# Patient Record
Sex: FEMALE | URBAN - NONMETROPOLITAN AREA
[De-identification: names, ages, dates, MRNs, and addresses within clinical notes are randomized per-mention and may not be internally consistent; named-entity substitution may affect disease eponyms.]

---

## 2019-07-19 ENCOUNTER — DOCTOR'S OFFICE (OUTPATIENT)
Dept: URBAN - NONMETROPOLITAN AREA CLINIC 1 | Facility: CLINIC | Age: 79
Setting detail: OPHTHALMOLOGY
End: 2019-07-19
Payer: COMMERCIAL

## 2019-07-19 DIAGNOSIS — L25.5: ICD-10-CM

## 2019-07-19 PROCEDURE — 99201 NP BRIEF: CPT | Performed by: OPHTHALMOLOGY

## 2019-07-19 ASSESSMENT — REFRACTION_MANIFEST
OD_VA3: 20/
OS_VA3: 20/
OS_VA2: 20/
OD_VA2: 20/
OS_VA1: 20/
OS_VA3: 20/
OU_VA: 20/
OD_VA1: 20/
OD_VA3: 20/
OD_VA2: 20/
OS_VA1: 20/
OD_VA1: 20/
OS_VA2: 20/
OU_VA: 20/

## 2019-07-19 ASSESSMENT — REFRACTION_CURRENTRX
OD_OVR_VA: 20/
OS_OVR_VA: 20/
OD_OVR_VA: 20/
OD_OVR_VA: 20/

## 2019-07-19 ASSESSMENT — VISUAL ACUITY
OD_BCVA: 20/30-1
OS_BCVA: 20/30-1

## 2019-07-19 ASSESSMENT — LID EXAM ASSESSMENTS: OS_EDEMA: LLL LUL 1+

## 2021-02-14 ENCOUNTER — APPOINTMENT (EMERGENCY)
Dept: CT IMAGING | Facility: HOSPITAL | Age: 81
End: 2021-02-14
Payer: MEDICARE

## 2021-02-14 ENCOUNTER — HOSPITAL ENCOUNTER (EMERGENCY)
Facility: HOSPITAL | Age: 81
Discharge: HOME | End: 2021-02-15
Attending: EMERGENCY MEDICINE
Payer: MEDICARE

## 2021-02-14 DIAGNOSIS — K44.9 HIATAL HERNIA: ICD-10-CM
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
## 2021-02-14 DIAGNOSIS — R10.13 EPIGASTRIC PAIN: Primary | ICD-10-CM

## 2021-02-14 LAB
ALBUMIN SERPL BCP-MCNC: 4.1 G/DL (ref 3.5–5)
ALP SERPL-CCNC: 63 U/L (ref 46–116)
ALT SERPL W P-5'-P-CCNC: 22 U/L (ref 12–78)
ANION GAP SERPL CALCULATED.3IONS-SCNC: 9 MMOL/L (ref 4–13)
AST SERPL W P-5'-P-CCNC: 16 U/L (ref 5–45)
BASOPHILS # BLD AUTO: 0.06 THOUSANDS/ΜL (ref 0–0.1)
BASOPHILS NFR BLD AUTO: 1 % (ref 0–1)
BILIRUB SERPL-MCNC: 0.2 MG/DL (ref 0.2–1)
BUN SERPL-MCNC: 13 MG/DL (ref 5–25)
CALCIUM SERPL-MCNC: 9.2 MG/DL (ref 8.3–10.1)
CHLORIDE SERPL-SCNC: 101 MMOL/L (ref 100–108)
CO2 SERPL-SCNC: 27 MMOL/L (ref 21–32)
CREAT SERPL-MCNC: 1.32 MG/DL (ref 0.6–1.3)
EOSINOPHIL # BLD AUTO: 0.13 THOUSAND/ΜL (ref 0–0.61)
EOSINOPHIL NFR BLD AUTO: 2 % (ref 0–6)
ERYTHROCYTE [DISTWIDTH] IN BLOOD BY AUTOMATED COUNT: 14 % (ref 11.6–15.1)
GFR SERPL CREATININE-BSD FRML MDRD: 38 ML/MIN/1.73SQ M
GLUCOSE SERPL-MCNC: 101 MG/DL (ref 65–140)
HCT VFR BLD AUTO: 30.8 % (ref 34.8–46.1)
HGB BLD-MCNC: 8.9 G/DL (ref 11.5–15.4)
IMM GRANULOCYTES # BLD AUTO: 0.02 THOUSAND/UL (ref 0–0.2)
IMM GRANULOCYTES NFR BLD AUTO: 0 % (ref 0–2)
LACTATE SERPL-SCNC: 0.8 MMOL/L (ref 0.5–2)
LIPASE SERPL-CCNC: 231 U/L (ref 73–393)
LYMPHOCYTES # BLD AUTO: 1.84 THOUSANDS/ΜL (ref 0.6–4.47)
LYMPHOCYTES NFR BLD AUTO: 32 % (ref 14–44)
MAGNESIUM SERPL-MCNC: 1.9 MG/DL (ref 1.6–2.6)
MCH RBC QN AUTO: 27.6 PG (ref 26.8–34.3)
MCHC RBC AUTO-ENTMCNC: 28.9 G/DL (ref 31.4–37.4)
MCV RBC AUTO: 95 FL (ref 82–98)
MONOCYTES # BLD AUTO: 0.51 THOUSAND/ΜL (ref 0.17–1.22)
MONOCYTES NFR BLD AUTO: 9 % (ref 4–12)
NEUTROPHILS # BLD AUTO: 3.18 THOUSANDS/ΜL (ref 1.85–7.62)
NEUTS SEG NFR BLD AUTO: 56 % (ref 43–75)
NRBC BLD AUTO-RTO: 0 /100 WBCS
PLATELET # BLD AUTO: 402 THOUSANDS/UL (ref 149–390)
PMV BLD AUTO: 8.6 FL (ref 8.9–12.7)
POTASSIUM SERPL-SCNC: 4.1 MMOL/L (ref 3.5–5.3)
PROT SERPL-MCNC: 7.4 G/DL (ref 6.4–8.2)
RBC # BLD AUTO: 3.23 MILLION/UL (ref 3.81–5.12)
SODIUM SERPL-SCNC: 137 MMOL/L (ref 136–145)
TROPONIN I SERPL-MCNC: <0.02 NG/ML
WBC # BLD AUTO: 5.74 THOUSAND/UL (ref 4.31–10.16)

## 2021-02-14 PROCEDURE — 85025 COMPLETE CBC W/AUTO DIFF WBC: CPT | Performed by: EMERGENCY MEDICINE

## 2021-02-14 PROCEDURE — 90471 IMMUNIZATION ADMIN: CPT

## 2021-02-14 PROCEDURE — G1004 CDSM NDSC: HCPCS

## 2021-02-14 PROCEDURE — 71250 CT THORAX DX C-: CPT

## 2021-02-14 PROCEDURE — 99284 EMERGENCY DEPT VISIT MOD MDM: CPT | Performed by: EMERGENCY MEDICINE

## 2021-02-14 PROCEDURE — 90715 TDAP VACCINE 7 YRS/> IM: CPT | Performed by: EMERGENCY MEDICINE

## 2021-02-14 PROCEDURE — 70450 CT HEAD/BRAIN W/O DYE: CPT

## 2021-02-14 PROCEDURE — 96375 TX/PRO/DX INJ NEW DRUG ADDON: CPT

## 2021-02-14 PROCEDURE — 99284 EMERGENCY DEPT VISIT MOD MDM: CPT

## 2021-02-14 PROCEDURE — 36415 COLL VENOUS BLD VENIPUNCTURE: CPT | Performed by: EMERGENCY MEDICINE

## 2021-02-14 PROCEDURE — 84484 ASSAY OF TROPONIN QUANT: CPT | Performed by: EMERGENCY MEDICINE

## 2021-02-14 PROCEDURE — 83690 ASSAY OF LIPASE: CPT | Performed by: EMERGENCY MEDICINE

## 2021-02-14 PROCEDURE — 80053 COMPREHEN METABOLIC PANEL: CPT | Performed by: EMERGENCY MEDICINE

## 2021-02-14 PROCEDURE — 83605 ASSAY OF LACTIC ACID: CPT | Performed by: EMERGENCY MEDICINE

## 2021-02-14 PROCEDURE — 93005 ELECTROCARDIOGRAM TRACING: CPT

## 2021-02-14 PROCEDURE — 72125 CT NECK SPINE W/O DYE: CPT

## 2021-02-14 PROCEDURE — 83735 ASSAY OF MAGNESIUM: CPT | Performed by: EMERGENCY MEDICINE

## 2021-02-14 PROCEDURE — 74176 CT ABD & PELVIS W/O CONTRAST: CPT

## 2021-02-14 PROCEDURE — 96374 THER/PROPH/DIAG INJ IV PUSH: CPT

## 2021-02-14 RX ORDER — FENTANYL CITRATE 50 UG/ML
25 INJECTION, SOLUTION INTRAMUSCULAR; INTRAVENOUS ONCE
Status: COMPLETED | OUTPATIENT
Start: 2021-02-14 | End: 2021-02-14

## 2021-02-14 RX ORDER — ONDANSETRON 2 MG/ML
4 INJECTION INTRAMUSCULAR; INTRAVENOUS ONCE
Status: COMPLETED | OUTPATIENT
Start: 2021-02-14 | End: 2021-02-14

## 2021-02-14 RX ADMIN — TETANUS TOXOID, REDUCED DIPHTHERIA TOXOID AND ACELLULAR PERTUSSIS VACCINE, ADSORBED 0.5 ML: 5; 2.5; 8; 8; 2.5 SUSPENSION INTRAMUSCULAR at 23:41

## 2021-02-14 RX ADMIN — ONDANSETRON 4 MG: 2 INJECTION INTRAMUSCULAR; INTRAVENOUS at 23:40

## 2021-02-14 RX ADMIN — FENTANYL CITRATE 25 MCG: 50 INJECTION, SOLUTION INTRAMUSCULAR; INTRAVENOUS at 23:39

## 2021-02-15 VITALS
DIASTOLIC BLOOD PRESSURE: 60 MMHG | HEART RATE: 70 BPM | OXYGEN SATURATION: 100 % | RESPIRATION RATE: 18 BRPM | WEIGHT: 149.03 LBS | TEMPERATURE: 98 F | SYSTOLIC BLOOD PRESSURE: 139 MMHG

## 2021-02-15 LAB
ATRIAL RATE: 72 BPM
ATRIAL RATE: 77 BPM
BACTERIA UR QL AUTO: ABNORMAL /HPF
BILIRUB UR QL STRIP: NEGATIVE
CLARITY UR: CLEAR
COLOR UR: YELLOW
GLUCOSE UR STRIP-MCNC: NEGATIVE MG/DL
HGB UR QL STRIP.AUTO: NEGATIVE
KETONES UR STRIP-MCNC: NEGATIVE MG/DL
LEUKOCYTE ESTERASE UR QL STRIP: ABNORMAL
NITRITE UR QL STRIP: NEGATIVE
NON-SQ EPI CELLS URNS QL MICRO: ABNORMAL /HPF
P AXIS: 40 DEGREES
P AXIS: 46 DEGREES
PH UR STRIP.AUTO: 5.5 [PH]
PR INTERVAL: 154 MS
PR INTERVAL: 168 MS
PROT UR STRIP-MCNC: NEGATIVE MG/DL
QRS AXIS: 49 DEGREES
QRS AXIS: 53 DEGREES
QRSD INTERVAL: 78 MS
QRSD INTERVAL: 80 MS
QT INTERVAL: 386 MS
QT INTERVAL: 394 MS
QTC INTERVAL: 431 MS
QTC INTERVAL: 436 MS
RBC #/AREA URNS AUTO: ABNORMAL /HPF
SP GR UR STRIP.AUTO: <=1.005 (ref 1–1.03)
T WAVE AXIS: 44 DEGREES
T WAVE AXIS: 51 DEGREES
TROPONIN I SERPL-MCNC: <0.02 NG/ML
UROBILINOGEN UR QL STRIP.AUTO: 0.2 E.U./DL
VENTRICULAR RATE: 72 BPM
VENTRICULAR RATE: 77 BPM
WBC #/AREA URNS AUTO: ABNORMAL /HPF

## 2021-02-15 PROCEDURE — 93010 ELECTROCARDIOGRAM REPORT: CPT | Performed by: INTERNAL MEDICINE

## 2021-02-15 PROCEDURE — 93005 ELECTROCARDIOGRAM TRACING: CPT

## 2021-02-15 PROCEDURE — 96375 TX/PRO/DX INJ NEW DRUG ADDON: CPT

## 2021-02-15 PROCEDURE — 84484 ASSAY OF TROPONIN QUANT: CPT | Performed by: EMERGENCY MEDICINE

## 2021-02-15 PROCEDURE — 81001 URINALYSIS AUTO W/SCOPE: CPT | Performed by: EMERGENCY MEDICINE

## 2021-02-15 PROCEDURE — 36415 COLL VENOUS BLD VENIPUNCTURE: CPT | Performed by: EMERGENCY MEDICINE

## 2021-02-15 RX ORDER — LIDOCAINE HYDROCHLORIDE 20 MG/ML
15 SOLUTION OROPHARYNGEAL ONCE
Status: COMPLETED | OUTPATIENT
Start: 2021-02-15 | End: 2021-02-15

## 2021-02-15 RX ORDER — MAGNESIUM HYDROXIDE/ALUMINUM HYDROXICE/SIMETHICONE 120; 1200; 1200 MG/30ML; MG/30ML; MG/30ML
15 SUSPENSION ORAL ONCE
Status: COMPLETED | OUTPATIENT
Start: 2021-02-15 | End: 2021-02-15

## 2021-02-15 RX ADMIN — LIDOCAINE HYDROCHLORIDE 15 ML: 20 SOLUTION ORAL; TOPICAL at 00:54

## 2021-02-15 RX ADMIN — ALUMINA, MAGNESIA, AND SIMETHICONE ORAL SUSPENSION REGULAR STRENGTH 15 ML: 1200; 1200; 120 SUSPENSION ORAL at 00:54

## 2021-02-15 RX ADMIN — FAMOTIDINE 20 MG: 10 INJECTION INTRAVENOUS at 00:40

## 2021-02-15 NOTE — ED PROVIDER NOTES
Emergency Department Trauma Note  Juan Daniel Churchill 79 y o  female MRN: [id number]  Unit/Bed#: RM01/RM01 Encounter: [id number]      Trauma Alert: Trauma Acuity: Trauma Evaluation  Model of Arrival: Mode of Arrival: BLS via Trauma Squad Name and Number: [id number]  Trauma Team: Current Providers  Attending Provider: Melanie Ramirez MD  ED Technician: Ry Watkins  Registered Nurse: Aicha Coker RN  Consultants: None      History of Present Illness     Chief Complaint:   Chief Complaint   Patient presents with    Fall     Pt states she has been shoveling snow the past few days  She fell a few days ago falling on her buttocks  She is c/o pain in her back and in her rib cage  HPI:  Juan Daniel Churchill is a 79 y o  female who presents with see above  Mechanism:Details of Incident: Pt states she has been shoveling snow the past few days  She fell a few days ago falling on her buttocks  She is c/o pain in her back and in her rib cage  Injury Date: 02/11/21 Injury Time: 0000 Injury Occurence Location - [address] [place] [address] Way: [facility]    79year-old female arrives via EMS with 5 day history of epigastric pain which she describes as sharp episodic  She also sustained a fall to her back 3 days ago while shoveling snow  She denies hitting her head or loss of consciousness but has posterior rib pain  She has occasional pleuritic pain  She denies any fever chills or cough  She has no headache she denies any neck pain there is no numbness or tingling patient is not anticoagulated she has had no difficulties with bowel movements she has had no dysuria or increased urinary frequency she does have left chronic knee pain which is unchanged  She is denying any hip pain no n/p no lightheadedness  VS enroute 971 110 20 157/89 99%RA accucheck 110        Review of Systems   Constitutional: Negative for activity change, appetite change, chills and fever  HENT: Negative for congestion, ear pain, rhinorrhea, sneezing and sore throat      Eyes: Negative for discharge  Respiratory: Positive for shortness of breath  Negative for cough and chest tightness  Cardiovascular: Negative for chest pain and leg swelling  Gastrointestinal: Positive for abdominal pain  Negative for abdominal distention, blood in stool, diarrhea, nausea and vomiting  Endocrine: Negative for polyuria  Genitourinary: Negative for difficulty urinating, dysuria, frequency and urgency  Musculoskeletal: Positive for arthralgias (left knee pain chronic)  Negative for back pain, myalgias, neck pain and neck stiffness  Skin: Negative for rash  Neurological: Negative for dizziness, speech difficulty, weakness, light-headedness, numbness and headaches  Hematological: Negative for adenopathy  Psychiatric/Behavioral: Negative for confusion  All other systems reviewed and are negative  Historical Information     Immunizations:   Immunization History   Administered Date(s) Administered    Tdap 02/14/2021       Past Medical History:   Diagnosis Date    Arthritis     Diabetes mellitus ([facility] [address] )      History reviewed  No pertinent family history  History reviewed  No pertinent surgical history    Social History     Tobacco Use    Smoking status: Never Smoker    Smokeless tobacco: Never Used   Substance Use Topics    Alcohol use: Never     Frequency: Never    Drug use: Never     E-Cigarette/Vaping     E-Cigarette/Vaping Substances       Family History: non-contributory    Meds/Allergies   None       Allergies   Allergen Reactions    Dye [Iodinated Diagnostic Agents] Nausea Only    Penicillins Other (See Comments)     "dizzy, nauseous"       PHYSICAL EXAM    PE limited by: n/a    Objective   Vitals:   First set: Temperature: 98 °F (36 7 °C) (02/14/21 2310)  Pulse: 84 (02/14/21 2310)  Respirations: 18 (02/14/21 2310)  Blood Pressure: (!) 198/108 (02/14/21 2310)  SpO2: 100 % (02/14/21 2310)    Primary Survey:   (A) Airway: speaks full sentences  (B) Breathing: BS equal bilaterally  (C) Circulation: Pulses:   normal  (D) Disabliity:  GCS Total:  15  (E) Expose:  Completed    Secondary Survey: (Click on Physical Exam tab above)  Physical Exam  Vitals signs and nursing note reviewed  Constitutional:       General: She is not in acute distress  Appearance: She is not ill-appearing, toxic-appearing or diaphoretic  HENT:      Head: Normocephalic and atraumatic  Right Ear: Tympanic membrane and external ear normal  There is no impacted cerumen  Left Ear: Tympanic membrane and external ear normal  There is no impacted cerumen  Nose: Nose normal  No congestion or rhinorrhea  Mouth/Throat:      Mouth: Mucous membranes are moist       Pharynx: Oropharynx is clear  No oropharyngeal exudate or posterior oropharyngeal erythema  Eyes:      General:         Right eye: No discharge  Left eye: No discharge  Extraocular Movements: Extraocular movements intact  Conjunctiva/sclera: Conjunctivae normal       Pupils: Pupils are equal, round, and reactive to light  Neck:      Musculoskeletal: Normal range of motion  No neck rigidity or muscular tenderness  Cardiovascular:      Rate and Rhythm: Normal rate  Pulses: Normal pulses  Pulmonary:      Effort: Pulmonary effort is normal  No respiratory distress  Breath sounds: No stridor  No wheezing or rales  Chest:      Chest wall: No tenderness  Abdominal:      General: Abdomen is flat  Bowel sounds are normal  There is no distension  Palpations: Abdomen is soft  There is no mass  Tenderness: There is abdominal tenderness (epigastric tendnerness)  There is no right CVA tenderness, left CVA tenderness, guarding or rebound  Comments: No midline t or L spine tenderness no ecchymoisis or bruising   Musculoskeletal: Normal range of motion  General: No swelling or tenderness  Left lower leg: No edema  Lymphadenopathy:      Cervical: No cervical adenopathy     Skin: General: Skin is warm and dry  Capillary Refill: Capillary refill takes less than 2 seconds  Findings: No rash  Neurological:      General: No focal deficit present  Mental Status: She is alert and oriented to person, place, and time  Cranial Nerves: No cranial nerve deficit  Sensory: No sensory deficit  Motor: No weakness  Coordination: Coordination normal       Gait: Gait normal       Comments: Prior to discharge ambulates with steady gait   Psychiatric:         Mood and Affect: Mood normal          Cervical spine cleared by clinical criteria?  No (imaging required)      Invasive Devices     None                 Lab Results:   Results Reviewed     Procedure Component Value Units Date/Time    Troponin I [[id number]]  (Normal) Collected: 02/15/21 0252    Lab Status: Final result Specimen: Blood from Arm, Right Updated: 02/15/21 0328     Troponin I <0 02 ng/mL     Urine Microscopic [[id number]]  (Abnormal) Collected: 02/15/21 0230    Lab Status: Final result Specimen: Urine, Clean Catch Updated: 02/15/21 0319     RBC, UA None Seen /hpf      WBC, UA 4-10 /hpf      Epithelial Cells Occasional /hpf      Bacteria, UA Occasional /hpf     UA w Reflex to Microscopic w Reflex to Culture [[id number]]  (Abnormal) Collected: 02/15/21 0230    Lab Status: Final result Specimen: Urine, Clean Catch Updated: 02/15/21 0243     Color, UA Yellow     Clarity, UA Clear     Specific Gravity, UA <=1 005     pH, UA 5 5     Leukocytes, UA Small     Nitrite, UA Negative     Protein, UA Negative mg/dl      Glucose, UA Negative mg/dl      Ketones, UA Negative mg/dl      Urobilinogen, UA 0 2 E U /dl      Bilirubin, UA Negative     Blood, UA Negative    Troponin I [[id number]]  (Normal) Collected: 02/14/21 2317    Lab Status: Final result Specimen: Blood from Arm, Right Updated: 02/14/21 2342     Troponin I <0 02 ng/mL     Lactic acid [[id number]]  (Normal) Collected: 02/14/21 2317    Lab Status: Final result Specimen: Blood from Arm, Right Updated: 02/14/21 2342     LACTIC ACID 0 8 mmol/L     Narrative:      Result may be elevated if tourniquet was used during collection      Comprehensive metabolic panel [[id number]]  (Abnormal) Collected: 02/14/21 2317    Lab Status: Final result Specimen: Blood from Arm, Right Updated: 02/14/21 2339     Sodium 137 mmol/L      Potassium 4 1 mmol/L      Chloride 101 mmol/L      CO2 27 mmol/L      ANION GAP 9 mmol/L      BUN 13 mg/dL      Creatinine 1 32 mg/dL      Glucose 101 mg/dL      Calcium 9 2 mg/dL      AST 16 U/L      ALT 22 U/L      Alkaline Phosphatase 63 U/L      Total Protein 7 4 g/dL      Albumin 4 1 g/dL      Total Bilirubin 0 20 mg/dL      eGFR 38 ml/min/1 73sq m     Narrative:      [place] guidelines for Chronic Kidney Disease (CKD):     Stage 1 with normal or high GFR (GFR > 90 mL/min/1 73 square meters)    Stage 2 Mild CKD (GFR = 60-89 mL/min/1 73 square meters)    Stage 3A Moderate CKD (GFR = 45-59 mL/min/1 73 square meters)    Stage 3B Moderate CKD (GFR = 30-44 mL/min/1 73 square meters)    Stage 4 Severe CKD (GFR = 15-29 mL/min/1 73 square meters)    Stage 5 End Stage CKD (GFR <15 mL/min/1 73 square meters)  Note: GFR calculation is accurate only with a steady state creatinine    Lipase [[id number]]  (Normal) Collected: 02/14/21 2317    Lab Status: Final result Specimen: Blood from Arm, Right Updated: 02/14/21 2333     Lipase 231 u/L     Magnesium [[id number]]  (Normal) Collected: 02/14/21 2317    Lab Status: Final result Specimen: Blood from Arm, Right Updated: 02/14/21 2333     Magnesium 1 9 mg/dL     CBC and differential [[id number]]  (Abnormal) Collected: 02/14/21 2317    Lab Status: Final result Specimen: Blood from Arm, Right Updated: 02/14/21 2324     WBC 5 74 Thousand/uL      RBC 3 23 Million/uL      Hemoglobin 8 9 g/dL      Hematocrit 30 8 %      MCV 95 fL      MCH 27 6 pg      MCHC 28 9 g/dL      RDW 14 0 %      MPV 8 6 fL Platelets 125 Thousands/uL      nRBC 0 /100 WBCs      Neutrophils Relative 56 %      Immat GRANS % 0 %      Lymphocytes Relative 32 %      Monocytes Relative 9 %      Eosinophils Relative 2 %      Basophils Relative 1 %      Neutrophils Absolute 3 18 Thousands/µL      Immature Grans Absolute 0 02 Thousand/uL      Lymphocytes Absolute 1 84 Thousands/µL      Monocytes Absolute 0 51 Thousand/µL      Eosinophils Absolute 0 13 Thousand/µL      Basophils Absolute 0 06 Thousands/µL                  Imaging Studies:   Direct to CT: No  CT chest abdomen pelvis wo contrast   Final Result by Elidia Guardado MD (02/15 0006)      1  No acute traumatic injury identified within the chest, abdomen, or pelvis  2   The blood pool is lower in density within the myocardium which may be seen in setting of anemia  3   Large hiatal hernia  4   Diverticulosis without evidence of diverticulitis  Workstation performed: [id number]         CT cervical spine without contrast   Final Result by Elidia Guardado MD (02/15 0006)      No cervical spine fracture or traumatic malalignment  Workstation performed: [id number]         CT head without contrast   Final Result by Elidia Guardado MD (02/15 0006)      1  No intracranial hemorrhage or calvarial fracture  2   Chronic small vessel ischemic changes  3   6 x 3 mm dural based left parietal calcified mass likely due to a small meningioma        Workstation performed: [id number]               Procedures  ECG 12 Lead Documentation Only    Date/Time: 2/14/2021 11:50 PM  Performed by: Telma Rosenberg MD  Authorized by: Telma Rosenberg MD     Indications / Diagnosis:  Epigastric pain  ECG reviewed by me, the ED Provider: yes    Patient location:  ED  Previous ECG:     Previous ECG:  Unavailable  Rate:     ECG rate:  77    ECG rate assessment: normal    Rhythm:     Rhythm: sinus rhythm    QRS:     QRS axis:  Normal  Comments:      No acute ischemic changes             ED Course  ED Course as of Feb 23 1608   Mon Feb 15, 2021   0027 Lani John reviewed 4/16/2020 Hb 8 5       0044 Epigastric pain returning patient is unsure of is taking protonix; will admin gi cocktail; famotidine and complete delta trop      0347 Ambulated to bathroom with steady gait no complaints of dysuria will await UC (no emperic treatment)l sleeping comfortably easily awakened recommend she continue her Protonix and follow up with her family doctor early next week  No evidence of subendocardial ischemia  MDM  Number of Diagnoses or Management Options  Epigastric pain:   Fall:   Hiatal hernia:   Diagnosis management comments: Mdm:  Trauma eval called prior to arrival   Elderly female with same level mechanical fall backwards  She has also had epigastric pain which is preceded the fall will check for acute coronary syndrome, electrolyte abnormality pancreatitis initiate antiemetics pain management and re-evaluate      Addendum:  Portable plain film chest x-ray was considered but deferred for CT imaging as she had no reproducible chest wall tenderness  After return of C-spine CT scan patient was placed through range of motion of the neck she had no radicular symptoms no worsening pain therefore cervical collar immobilization was discontinued  Disposition  [facility] Transfer: No  Final diagnoses:   Epigastric pain   Hiatal hernia   Fall     Time reflects when diagnosis was documented in both MDM as applicable and the Disposition within this note     Time User Action Codes Description Comment    2/15/2021  3:49 AM Pansy Bath Add [R10 13] Epigastric pain     2/15/2021  3:49 AM Pansy Bath Add [K44 9] Hiatal hernia     2/15/2021  3:50 AM Pansy Bath Add [C79  XXXA] Fall       ED Disposition     ED Disposition Condition Date/Time Comment    Discharge Stable Mon Feb 15, 2021  3:50 AM Tiffany Loya discharge to home/self care              Follow-up Information     Follow up With Specialties Details Why [address]  Call in 1 day recheck of symptoms, anemia, and kidney numbers [address]  [facility]  [phone]          There are no discharge medications for this patient  No discharge procedures on file      PDMP Review     None          ED Provider  Electronically Signed by         Florentin Khan MD  02/15/21 0225       Florentin Khan MD  02/23/21 8124

## 2021-02-15 NOTE — DISCHARGE INSTRUCTIONS
Continue pantopraxole 40mg daily (protonix)  Tylenol 650mg every 6 hours as needed for pain, fever (max 3000mg in 24 hours)   Return with fever, inability to keep fluids down, dark tarry stools red stools, chest pain shortness of breath  worsening or change in pain or any new or worsening symptoms

## 2021-06-30 ENCOUNTER — APPOINTMENT (EMERGENCY)
Dept: CT IMAGING | Facility: HOSPITAL | Age: 81
DRG: 392 | End: 2021-06-30
Payer: MEDICARE

## 2021-06-30 ENCOUNTER — APPOINTMENT (EMERGENCY)
Dept: RADIOLOGY | Facility: HOSPITAL | Age: 81
DRG: 392 | End: 2021-06-30
Payer: MEDICARE

## 2021-06-30 ENCOUNTER — APPOINTMENT (INPATIENT)
Dept: ULTRASOUND IMAGING | Facility: HOSPITAL | Age: 81
DRG: 392 | End: 2021-06-30
Payer: MEDICARE

## 2021-06-30 ENCOUNTER — HOSPITAL ENCOUNTER (INPATIENT)
Facility: HOSPITAL | Age: 81
LOS: 2 days | Discharge: HOME | DRG: 392 | End: 2021-07-02
Attending: EMERGENCY MEDICINE | Admitting: INTERNAL MEDICINE
Payer: MEDICARE

## 2021-06-30 DIAGNOSIS — K44.9 LARGE HIATAL HERNIA: ICD-10-CM

## 2021-06-30 DIAGNOSIS — R10.13 EPIGASTRIC PAIN: Primary | ICD-10-CM

## 2021-06-30 DIAGNOSIS — R63.4 UNINTENTIONAL WEIGHT LOSS OF MORE THAN 7.5% BODY WEIGHT WITHIN 3 MONTHS: ICD-10-CM

## 2021-06-30 DIAGNOSIS — E44.0 MODERATE PROTEIN-CALORIE MALNUTRITION (HCC): ICD-10-CM

## 2021-06-30 PROBLEM — E11.9 TYPE 2 DIABETES MELLITUS WITHOUT COMPLICATION, WITHOUT LONG-TERM CURRENT USE OF INSULIN (HCC): Status: ACTIVE | Noted: 2021-06-30

## 2021-06-30 PROBLEM — I10 ESSENTIAL HYPERTENSION: Status: ACTIVE | Noted: 2021-06-30

## 2021-06-30 PROBLEM — E78.5 DYSLIPIDEMIA: Status: ACTIVE | Noted: 2021-06-30

## 2021-06-30 PROBLEM — E03.8 OTHER SPECIFIED HYPOTHYROIDISM: Status: ACTIVE | Noted: 2021-06-30

## 2021-06-30 LAB
ABO GROUP BLD: NORMAL
ABO GROUP BLD: NORMAL
ALBUMIN SERPL BCP-MCNC: 3.7 G/DL (ref 3.5–5)
ALP SERPL-CCNC: 48 U/L (ref 46–116)
ALT SERPL W P-5'-P-CCNC: 18 U/L (ref 12–78)
ANION GAP SERPL CALCULATED.3IONS-SCNC: 10 MMOL/L (ref 4–13)
APTT PPP: 32 SECONDS (ref 23–37)
AST SERPL W P-5'-P-CCNC: 16 U/L (ref 5–45)
BASOPHILS # BLD AUTO: 0.03 THOUSANDS/ΜL (ref 0–0.1)
BASOPHILS NFR BLD AUTO: 1 % (ref 0–1)
BILIRUB SERPL-MCNC: 0.3 MG/DL (ref 0.2–1)
BILIRUB UR QL STRIP: NEGATIVE
BLD GP AB SCN SERPL QL: NEGATIVE
BUN SERPL-MCNC: 11 MG/DL (ref 5–25)
CALCIUM SERPL-MCNC: 9 MG/DL (ref 8.3–10.1)
CHLORIDE SERPL-SCNC: 106 MMOL/L (ref 100–108)
CLARITY UR: CLEAR
CO2 SERPL-SCNC: 25 MMOL/L (ref 21–32)
COLOR UR: YELLOW
CREAT SERPL-MCNC: 1.14 MG/DL (ref 0.6–1.3)
EOSINOPHIL # BLD AUTO: 0.04 THOUSAND/ΜL (ref 0–0.61)
EOSINOPHIL NFR BLD AUTO: 1 % (ref 0–6)
ERYTHROCYTE [DISTWIDTH] IN BLOOD BY AUTOMATED COUNT: 16.9 % (ref 11.6–15.1)
GFR SERPL CREATININE-BSD FRML MDRD: 45 ML/MIN/1.73SQ M
GLUCOSE SERPL-MCNC: 107 MG/DL (ref 65–140)
GLUCOSE SERPL-MCNC: 89 MG/DL (ref 65–140)
GLUCOSE UR STRIP-MCNC: NEGATIVE MG/DL
HCT VFR BLD AUTO: 37.1 % (ref 34.8–46.1)
HGB BLD-MCNC: 11.2 G/DL (ref 11.5–15.4)
HGB UR QL STRIP.AUTO: NEGATIVE
IMM GRANULOCYTES # BLD AUTO: 0.04 THOUSAND/UL (ref 0–0.2)
IMM GRANULOCYTES NFR BLD AUTO: 1 % (ref 0–2)
INR PPP: 1.09 (ref 0.84–1.19)
KETONES UR STRIP-MCNC: NEGATIVE MG/DL
LACTATE SERPL-SCNC: 1.9 MMOL/L (ref 0.5–2)
LEUKOCYTE ESTERASE UR QL STRIP: NEGATIVE
LIPASE SERPL-CCNC: 101 U/L (ref 73–393)
LYMPHOCYTES # BLD AUTO: 0.87 THOUSANDS/ΜL (ref 0.6–4.47)
LYMPHOCYTES NFR BLD AUTO: 16 % (ref 14–44)
MAGNESIUM SERPL-MCNC: 1.8 MG/DL (ref 1.6–2.6)
MCH RBC QN AUTO: 29.2 PG (ref 26.8–34.3)
MCHC RBC AUTO-ENTMCNC: 30.2 G/DL (ref 31.4–37.4)
MCV RBC AUTO: 97 FL (ref 82–98)
MONOCYTES # BLD AUTO: 0.35 THOUSAND/ΜL (ref 0.17–1.22)
MONOCYTES NFR BLD AUTO: 6 % (ref 4–12)
NEUTROPHILS # BLD AUTO: 4.27 THOUSANDS/ΜL (ref 1.85–7.62)
NEUTS SEG NFR BLD AUTO: 75 % (ref 43–75)
NITRITE UR QL STRIP: NEGATIVE
NRBC BLD AUTO-RTO: 0 /100 WBCS
NT-PROBNP SERPL-MCNC: 90 PG/ML
PH UR STRIP.AUTO: 5.5 [PH]
PLATELET # BLD AUTO: 226 THOUSANDS/UL (ref 149–390)
PMV BLD AUTO: 9.2 FL (ref 8.9–12.7)
POTASSIUM SERPL-SCNC: 4.1 MMOL/L (ref 3.5–5.3)
PROT SERPL-MCNC: 6.8 G/DL (ref 6.4–8.2)
PROT UR STRIP-MCNC: NEGATIVE MG/DL
PROTHROMBIN TIME: 13.9 SECONDS (ref 11.6–14.5)
RBC # BLD AUTO: 3.84 MILLION/UL (ref 3.81–5.12)
RH BLD: POSITIVE
RH BLD: POSITIVE
SARS-COV-2 RNA RESP QL NAA+PROBE: NEGATIVE
SODIUM SERPL-SCNC: 141 MMOL/L (ref 136–145)
SP GR UR STRIP.AUTO: 1.01 (ref 1–1.03)
SPECIMEN EXPIRATION DATE: NORMAL
TROPONIN I SERPL-MCNC: <0.02 NG/ML
UROBILINOGEN UR QL STRIP.AUTO: 0.2 E.U./DL
WBC # BLD AUTO: 5.6 THOUSAND/UL (ref 4.31–10.16)

## 2021-06-30 PROCEDURE — G1004 CDSM NDSC: HCPCS

## 2021-06-30 PROCEDURE — 76705 ECHO EXAM OF ABDOMEN: CPT

## 2021-06-30 PROCEDURE — 83735 ASSAY OF MAGNESIUM: CPT | Performed by: EMERGENCY MEDICINE

## 2021-06-30 PROCEDURE — 99285 EMERGENCY DEPT VISIT HI MDM: CPT | Performed by: EMERGENCY MEDICINE

## 2021-06-30 PROCEDURE — 96374 THER/PROPH/DIAG INJ IV PUSH: CPT

## 2021-06-30 PROCEDURE — 99223 1ST HOSP IP/OBS HIGH 75: CPT | Performed by: INTERNAL MEDICINE

## 2021-06-30 PROCEDURE — U0003 INFECTIOUS AGENT DETECTION BY NUCLEIC ACID (DNA OR RNA); SEVERE ACUTE RESPIRATORY SYNDROME CORONAVIRUS 2 (SARS-COV-2) (CORONAVIRUS DISEASE [COVID-19]), AMPLIFIED PROBE TECHNIQUE, MAKING USE OF HIGH THROUGHPUT TECHNOLOGIES AS DESCRIBED BY CMS-2020-01-R: HCPCS | Performed by: INTERNAL MEDICINE

## 2021-06-30 PROCEDURE — 83605 ASSAY OF LACTIC ACID: CPT | Performed by: EMERGENCY MEDICINE

## 2021-06-30 PROCEDURE — 1124F ACP DISCUSS-NO DSCNMKR DOCD: CPT | Performed by: EMERGENCY MEDICINE

## 2021-06-30 PROCEDURE — 93005 ELECTROCARDIOGRAM TRACING: CPT

## 2021-06-30 PROCEDURE — C9113 INJ PANTOPRAZOLE SODIUM, VIA: HCPCS | Performed by: EMERGENCY MEDICINE

## 2021-06-30 PROCEDURE — 80053 COMPREHEN METABOLIC PANEL: CPT | Performed by: EMERGENCY MEDICINE

## 2021-06-30 PROCEDURE — 85025 COMPLETE CBC W/AUTO DIFF WBC: CPT | Performed by: EMERGENCY MEDICINE

## 2021-06-30 PROCEDURE — 83690 ASSAY OF LIPASE: CPT | Performed by: EMERGENCY MEDICINE

## 2021-06-30 PROCEDURE — 83880 ASSAY OF NATRIURETIC PEPTIDE: CPT | Performed by: EMERGENCY MEDICINE

## 2021-06-30 PROCEDURE — 74176 CT ABD & PELVIS W/O CONTRAST: CPT

## 2021-06-30 PROCEDURE — 70450 CT HEAD/BRAIN W/O DYE: CPT

## 2021-06-30 PROCEDURE — U0005 INFEC AGEN DETEC AMPLI PROBE: HCPCS | Performed by: INTERNAL MEDICINE

## 2021-06-30 PROCEDURE — 96361 HYDRATE IV INFUSION ADD-ON: CPT

## 2021-06-30 PROCEDURE — 71045 X-RAY EXAM CHEST 1 VIEW: CPT

## 2021-06-30 PROCEDURE — 71250 CT THORAX DX C-: CPT

## 2021-06-30 PROCEDURE — 82948 REAGENT STRIP/BLOOD GLUCOSE: CPT

## 2021-06-30 PROCEDURE — 86850 RBC ANTIBODY SCREEN: CPT | Performed by: EMERGENCY MEDICINE

## 2021-06-30 PROCEDURE — 86900 BLOOD TYPING SEROLOGIC ABO: CPT | Performed by: EMERGENCY MEDICINE

## 2021-06-30 PROCEDURE — 81003 URINALYSIS AUTO W/O SCOPE: CPT | Performed by: EMERGENCY MEDICINE

## 2021-06-30 PROCEDURE — 99285 EMERGENCY DEPT VISIT HI MDM: CPT

## 2021-06-30 PROCEDURE — 85610 PROTHROMBIN TIME: CPT | Performed by: EMERGENCY MEDICINE

## 2021-06-30 PROCEDURE — 86901 BLOOD TYPING SEROLOGIC RH(D): CPT | Performed by: EMERGENCY MEDICINE

## 2021-06-30 PROCEDURE — 85730 THROMBOPLASTIN TIME PARTIAL: CPT | Performed by: EMERGENCY MEDICINE

## 2021-06-30 PROCEDURE — 84484 ASSAY OF TROPONIN QUANT: CPT | Performed by: EMERGENCY MEDICINE

## 2021-06-30 PROCEDURE — 36415 COLL VENOUS BLD VENIPUNCTURE: CPT | Performed by: EMERGENCY MEDICINE

## 2021-06-30 RX ORDER — LEVOTHYROXINE SODIUM 0.07 MG/1
75 TABLET ORAL DAILY
Status: DISCONTINUED | OUTPATIENT
Start: 2021-07-01 | End: 2021-07-02 | Stop reason: HOSPADM

## 2021-06-30 RX ORDER — SODIUM CHLORIDE 9 MG/ML
75 INJECTION, SOLUTION INTRAVENOUS CONTINUOUS
Status: DISPENSED | OUTPATIENT
Start: 2021-06-30 | End: 2021-07-01

## 2021-06-30 RX ORDER — ATORVASTATIN CALCIUM 20 MG/1
20 TABLET, FILM COATED ORAL DAILY
COMMUNITY

## 2021-06-30 RX ORDER — ONDANSETRON 2 MG/ML
4 INJECTION INTRAMUSCULAR; INTRAVENOUS EVERY 6 HOURS PRN
Status: DISCONTINUED | OUTPATIENT
Start: 2021-06-30 | End: 2021-07-02 | Stop reason: HOSPADM

## 2021-06-30 RX ORDER — SODIUM CHLORIDE 9 MG/ML
3 INJECTION INTRAVENOUS
Status: DISCONTINUED | OUTPATIENT
Start: 2021-06-30 | End: 2021-07-02 | Stop reason: HOSPADM

## 2021-06-30 RX ORDER — ACETAMINOPHEN 325 MG/1
650 TABLET ORAL EVERY 6 HOURS PRN
Status: DISCONTINUED | OUTPATIENT
Start: 2021-06-30 | End: 2021-07-02 | Stop reason: HOSPADM

## 2021-06-30 RX ORDER — LEVOTHYROXINE SODIUM 0.07 MG/1
75 TABLET ORAL DAILY
COMMUNITY

## 2021-06-30 RX ORDER — TRAMADOL HYDROCHLORIDE 50 MG/1
50 TABLET ORAL EVERY 6 HOURS PRN
Status: DISCONTINUED | OUTPATIENT
Start: 2021-06-30 | End: 2021-07-02 | Stop reason: HOSPADM

## 2021-06-30 RX ORDER — PANTOPRAZOLE SODIUM 40 MG/1
40 INJECTION, POWDER, FOR SOLUTION INTRAVENOUS ONCE
Status: COMPLETED | OUTPATIENT
Start: 2021-06-30 | End: 2021-06-30

## 2021-06-30 RX ORDER — CALCIUM CARBONATE 200(500)MG
1000 TABLET,CHEWABLE ORAL DAILY PRN
Status: DISCONTINUED | OUTPATIENT
Start: 2021-06-30 | End: 2021-07-02 | Stop reason: HOSPADM

## 2021-06-30 RX ORDER — ACETAMINOPHEN 500 MG
500 TABLET ORAL EVERY 6 HOURS PRN
COMMUNITY

## 2021-06-30 RX ORDER — LANOLIN ALCOHOL/MO/W.PET/CERES
3 CREAM (GRAM) TOPICAL
Status: DISCONTINUED | OUTPATIENT
Start: 2021-06-30 | End: 2021-07-02 | Stop reason: HOSPADM

## 2021-06-30 RX ORDER — SUCRALFATE ORAL 1 G/10ML
1000 SUSPENSION ORAL ONCE
Status: COMPLETED | OUTPATIENT
Start: 2021-06-30 | End: 2021-06-30

## 2021-06-30 RX ORDER — ONDANSETRON 4 MG/1
4 TABLET, ORALLY DISINTEGRATING ORAL EVERY 6 HOURS PRN
COMMUNITY

## 2021-06-30 RX ORDER — BACLOFEN 10 MG/1
10 TABLET ORAL 3 TIMES DAILY
COMMUNITY

## 2021-06-30 RX ORDER — FERROUS SULFATE 325(65) MG
TABLET ORAL
COMMUNITY

## 2021-06-30 RX ORDER — PANTOPRAZOLE SODIUM 40 MG/1
40 TABLET, DELAYED RELEASE ORAL DAILY
COMMUNITY

## 2021-06-30 RX ORDER — OXYCODONE HYDROCHLORIDE AND ACETAMINOPHEN 5; 325 MG/1; MG/1
1 TABLET ORAL ONCE
Status: COMPLETED | OUTPATIENT
Start: 2021-06-30 | End: 2021-06-30

## 2021-06-30 RX ORDER — POLYETHYLENE GLYCOL 3350 17 G/17G
17 POWDER, FOR SOLUTION ORAL DAILY PRN
Status: DISCONTINUED | OUTPATIENT
Start: 2021-06-30 | End: 2021-07-02 | Stop reason: HOSPADM

## 2021-06-30 RX ORDER — ATORVASTATIN CALCIUM 10 MG/1
20 TABLET, FILM COATED ORAL DAILY
Status: DISCONTINUED | OUTPATIENT
Start: 2021-06-30 | End: 2021-07-02 | Stop reason: HOSPADM

## 2021-06-30 RX ORDER — ONDANSETRON 2 MG/ML
4 INJECTION INTRAMUSCULAR; INTRAVENOUS ONCE
Status: COMPLETED | OUTPATIENT
Start: 2021-06-30 | End: 2021-06-30

## 2021-06-30 RX ORDER — NITROGLYCERIN 0.4 MG/1
0.4 TABLET SUBLINGUAL
COMMUNITY

## 2021-06-30 RX ORDER — PANTOPRAZOLE SODIUM 40 MG/1
40 INJECTION, POWDER, FOR SOLUTION INTRAVENOUS EVERY 12 HOURS SCHEDULED
Status: DISCONTINUED | OUTPATIENT
Start: 2021-07-01 | End: 2021-07-02 | Stop reason: HOSPADM

## 2021-06-30 RX ADMIN — PANTOPRAZOLE SODIUM 40 MG: 40 INJECTION, POWDER, FOR SOLUTION INTRAVENOUS at 16:50

## 2021-06-30 RX ADMIN — SODIUM CHLORIDE 1000 ML: 0.9 INJECTION, SOLUTION INTRAVENOUS at 13:48

## 2021-06-30 RX ADMIN — SODIUM CHLORIDE 75 ML/HR: 0.9 INJECTION, SOLUTION INTRAVENOUS at 18:44

## 2021-06-30 RX ADMIN — ONDANSETRON 4 MG: 2 INJECTION INTRAMUSCULAR; INTRAVENOUS at 13:49

## 2021-06-30 RX ADMIN — ATORVASTATIN CALCIUM 20 MG: 10 TABLET, FILM COATED ORAL at 19:44

## 2021-06-30 RX ADMIN — MORPHINE SULFATE 1 MG: 2 INJECTION, SOLUTION INTRAMUSCULAR; INTRAVENOUS at 22:25

## 2021-06-30 RX ADMIN — Medication 3 MG: at 22:26

## 2021-06-30 RX ADMIN — OXYCODONE HYDROCHLORIDE AND ACETAMINOPHEN 1 TABLET: 5; 325 TABLET ORAL at 13:50

## 2021-06-30 RX ADMIN — SUCRALFATE 1000 MG: 1 SUSPENSION ORAL at 13:49

## 2021-06-30 NOTE — PLAN OF CARE
Problem: Potential for Falls  Goal: Patient will remain free of falls  Description: INTERVENTIONS:  - Educate patient/family on patient safety including physical limitations  - Instruct patient to call for assistance with activity   - Consult OT/PT to assist with strengthening/mobility   - Keep Call bell within reach  - Keep bed low and locked with side rails adjusted as appropriate  - Keep care items and personal belongings within reach  - Initiate and maintain comfort rounds  - Make Fall Risk Sign visible to staff  - Offer Toileting every 2 Hours, in advance of need  - Initiate/Maintain bed alarm  - Obtain necessary fall risk management equipment: yellow socks  - Apply yellow socks and bracelet for high fall risk patients  - Consider moving patient to room near nurses station  Outcome: Progressing     Problem: GASTROINTESTINAL - ADULT  Goal: Minimal or absence of nausea and/or vomiting  Description: INTERVENTIONS:  - Administer IV fluids if ordered to ensure adequate hydration  - Maintain NPO status until nausea and vomiting are resolved  - Nasogastric tube if ordered  - Administer ordered antiemetic medications as needed  - Provide nonpharmacologic comfort measures as appropriate  - Advance diet as tolerated, if ordered  - Consider nutrition services referral to assist patient with adequate nutrition and appropriate food choices  Outcome: Progressing  Goal: Maintains or returns to baseline bowel function  Description: INTERVENTIONS:  - Assess bowel function  - Encourage oral fluids to ensure adequate hydration  - Administer IV fluids if ordered to ensure adequate hydration  - Administer ordered medications as needed  - Encourage mobilization and activity  - Consider nutritional services referral to assist patient with adequate nutrition and appropriate food choices  Outcome: Progressing  Goal: Maintains adequate nutritional intake  Description: INTERVENTIONS:  - Monitor percentage of each meal consumed  - Identify factors contributing to decreased intake, treat as appropriate  - Assist with meals as needed  - Monitor I&O, weight, and lab values if indicated  - Obtain nutrition services referral as needed  Outcome: Progressing  Goal: Establish and maintain optimal ostomy function  Description: INTERVENTIONS:  - Assess bowel function  - Encourage oral fluids to ensure adequate hydration  - Administer IV fluids if ordered to ensure adequate hydration   - Administer ordered medications as needed  - Encourage mobilization and activity  - Nutrition services referral to assist patient with appropriate food choices  - Assess stoma site  - Consider wound care consult   Outcome: Progressing  Goal: Oral mucous membranes remain intact  Description: INTERVENTIONS  - Assess oral mucosa and hygiene practices  - Implement preventative oral hygiene regimen  - Implement oral medicated treatments as ordered  - Initiate Nutrition services referral as needed  Outcome: Progressing     Problem: METABOLIC, FLUID AND ELECTROLYTES - ADULT  Goal: Electrolytes maintained within normal limits  Description: INTERVENTIONS:  - Monitor labs and assess patient for signs and symptoms of electrolyte imbalances  - Administer electrolyte replacement as ordered  - Monitor response to electrolyte replacements, including repeat lab results as appropriate  - Instruct patient on fluid and nutrition as appropriate  Outcome: Progressing  Goal: Fluid balance maintained  Description: INTERVENTIONS:  - Monitor labs   - Monitor I/O and WT  - Instruct patient on fluid and nutrition as appropriate  - Assess for signs & symptoms of volume excess or deficit  Outcome: Progressing  Goal: Glucose maintained within target range  Description: INTERVENTIONS:  - Monitor Blood Glucose as ordered  - Assess for signs and symptoms of hyperglycemia and hypoglycemia  - Administer ordered medications to maintain glucose within target range  - Assess nutritional intake and initiate nutrition service referral as needed  Outcome: Progressing

## 2021-06-30 NOTE — ASSESSMENT & PLAN NOTE
Lab Results   Component Value Date    HGBA1C 6 3 (H) 04/16/2021       No results for input(s): POCGLU in the last 72 hours      Blood Sugar Average: Last 72 hrs:   at goal  Insulin sliding scale with Accu-Cheks  Hypoglycemic protocol

## 2021-06-30 NOTE — ED PROVIDER NOTES
History  Chief Complaint   Patient presents with    Extremity Weakness     HPI    Pt presents from home, hx of DM, arthritis, HLD, hypothyroidism, upper GI bleed with symptomatic anemia, c/o numbness of her bilateral legs, generalized weakness, epigastric abd pain with radiation into her chest, sob and FORTE, poor appetite and nausea  Pt states the pain is constant, no alleviating/aggravating factors, mild to moderate intensity, no prior similar, "throbbing" in nature, "makes me stop what I'm doing and lay down," and currently present  Pt states that she awoke today "feeling bad," and she illicits the above symptoms  She decided to call EMS due to being concerned about her symptoms  She has been doing well, and states that "yesterday I painted most of my porch, and I was very active "  Pt denies ha, fevers, cough, v/d/c, dysuria, focal def or syncope  Prior to Admission Medications   Prescriptions Last Dose Informant Patient Reported?  Taking?   acetaminophen (TYLENOL) 500 mg tablet Past Week at Unknown time  Yes Yes   Sig: Take 500 mg by mouth every 6 (six) hours as needed for mild pain   atorvastatin (LIPITOR) 20 mg tablet 6/29/2021 at Unknown time  Yes Yes   Sig: Take 20 mg by mouth daily   baclofen 10 mg tablet 6/29/2021 at Unknown time  Yes Yes   Sig: Take 10 mg by mouth 3 (three) times a day   ferrous sulfate 325 (65 Fe) mg tablet 6/29/2021 at Unknown time  Yes Yes   Sig: Take by mouth daily with breakfast   insulin aspart (NovoLOG) 100 units/mL injection 6/29/2021 at Unknown time  Yes Yes   Sig: Inject under the skin   levothyroxine 75 mcg tablet 6/29/2021 at Unknown time  Yes Yes   Sig: Take 75 mcg by mouth daily   metFORMIN (GLUCOPHAGE) 500 mg tablet 6/29/2021 at Unknown time  Yes Yes   Sig: Take 500 mg by mouth 2 (two) times a day with meals   nitroglycerin (NITROSTAT) 0 4 mg SL tablet More than a month at Unknown time  Yes No   Sig: Place 0 4 mg under the tongue every 5 (five) minutes as needed for chest pain   ondansetron (ZOFRAN-ODT) 4 mg disintegrating tablet Past Month at Unknown time  Yes Yes   Sig: Take 4 mg by mouth every 6 (six) hours as needed for nausea or vomiting   pantoprazole (PROTONIX) 40 mg tablet 6/29/2021 at Unknown time  Yes Yes   Sig: Take 40 mg by mouth daily      Facility-Administered Medications: None       Past Medical History:   Diagnosis Date    Arthritis     Diabetes mellitus ([facility] [address] )        History reviewed  No pertinent surgical history  History reviewed  No pertinent family history  I have reviewed and agree with the history as documented  E-Cigarette/Vaping     E-Cigarette/Vaping Substances     Social History     Tobacco Use    Smoking status: Never Smoker    Smokeless tobacco: Never Used   Substance Use Topics    Alcohol use: Never    Drug use: Never       Review of Systems   Constitutional: Positive for activity change and appetite change  Negative for diaphoresis, fatigue and fever  HENT: Negative for congestion, facial swelling, mouth sores and trouble swallowing  Eyes: Negative for photophobia, discharge and visual disturbance  Respiratory: Positive for shortness of breath  Negative for apnea, cough and wheezing  Cardiovascular: Positive for chest pain  Negative for leg swelling  Gastrointestinal: Positive for abdominal pain and nausea  Negative for constipation, diarrhea and vomiting  Endocrine: Negative for heat intolerance and polydipsia  Genitourinary: Negative for dysuria, flank pain, frequency and hematuria  Musculoskeletal: Negative for back pain, gait problem, myalgias and neck pain  Skin: Negative for rash and wound  Allergic/Immunologic: Negative for immunocompromised state  Neurological: Positive for weakness and numbness  Negative for dizziness, syncope, light-headedness and headaches  Hematological: Negative for adenopathy  Psychiatric/Behavioral: Negative for agitation, confusion and self-injury   The patient is not nervous/anxious  Physical Exam  Physical Exam  Vitals and nursing note reviewed  Constitutional:       General: She is not in acute distress  Appearance: She is well-developed  She is not diaphoretic  HENT:      Head: Normocephalic and atraumatic  Right Ear: External ear normal       Left Ear: External ear normal       Nose: Nose normal       Mouth/Throat:      Pharynx: No oropharyngeal exudate  Eyes:      General: No scleral icterus  Right eye: No discharge  Left eye: No discharge  Conjunctiva/sclera: Conjunctivae normal       Pupils: Pupils are equal, round, and reactive to light  Neck:      Thyroid: No thyromegaly  Vascular: No JVD  Trachea: No tracheal deviation  Cardiovascular:      Rate and Rhythm: Normal rate and regular rhythm  Heart sounds: Normal heart sounds  No murmur heard  Pulmonary:      Effort: Pulmonary effort is normal  No respiratory distress  Breath sounds: Normal breath sounds  No stridor  No wheezing or rales  Chest:      Chest wall: No tenderness  Abdominal:      General: Bowel sounds are normal  There is no distension  Palpations: Abdomen is soft  There is no mass  Tenderness: There is abdominal tenderness  There is no guarding or rebound  Comments: Mild TTP diffusely in the abdomen  +vol guarding  No rebound, rigidity  +BS   Musculoskeletal:         General: No tenderness or deformity  Normal range of motion  Cervical back: Normal range of motion and neck supple  Lymphadenopathy:      Cervical: No cervical adenopathy  Skin:     General: Skin is warm and dry  Coloration: Skin is not pale  Findings: No erythema or rash  Neurological:      Mental Status: She is alert and oriented to person, place, and time  Cranial Nerves: No cranial nerve deficit  Motor: No abnormal muscle tone        Coordination: Coordination normal       Deep Tendon Reflexes: Reflexes are normal and symmetric  Reflexes normal    Psychiatric:         Behavior: Behavior normal          Thought Content: Thought content normal          Judgment: Judgment normal          Vital Signs  ED Triage Vitals   Temperature Pulse Respirations Blood Pressure SpO2   06/30/21 1245 06/30/21 1245 06/30/21 1245 06/30/21 1245 06/30/21 1245   97 7 °F (36 5 °C) 80 18 (!) 192/95 98 %      Temp Source Heart Rate Source Patient Position - Orthostatic VS BP Location FiO2 (%)   06/30/21 1245 06/30/21 1400 06/30/21 1400 06/30/21 1245 --   Temporal Monitor Sitting Right arm       Pain Score       06/30/21 1716       8           Vitals:    07/02/21 1318 07/02/21 1330 07/02/21 1338 07/02/21 1412   BP: 137/69 125/67 148/70 105/70   Pulse: 63 68 67 70   Patient Position - Orthostatic VS:             Visual Acuity      ED Medications  Medications   sodium chloride 0 9 % infusion (0 mL/hr Intravenous Stopped 7/2/21 0742)   sodium chloride 0 9 % infusion (0 mL/hr Intravenous Stopped 7/2/21 0742)   sodium chloride 0 9 % bolus 1,000 mL (0 mL Intravenous Stopped 6/30/21 1548)   ondansetron (ZOFRAN) injection 4 mg (4 mg Intravenous Given 6/30/21 1349)   sucralfate (CARAFATE) oral suspension (VANNESA/PEDS) 1,000 mg (1,000 mg Oral Given 6/30/21 1349)   oxyCODONE-acetaminophen (PERCOCET) 5-325 mg per tablet 1 tablet (1 tablet Oral Given 6/30/21 1350)   pantoprazole (PROTONIX) injection 40 mg (40 mg Intravenous Given 6/30/21 1650)   LORazepam (ATIVAN) injection 0 5 mg (0 5 mg Intravenous Given 7/2/21 1015)       Diagnostic Studies  Results Reviewed     Procedure Component Value Units Date/Time    NOVEL CORONAVIRUS (COVID-19), PCR [facility] [[id number]]  (Normal) Collected: 06/30/21 1651    Lab Status: Final result Specimen: Nasopharyngeal Swab Updated: 06/30/21 1748     SARS-CoV-2 Negative    Narrative:       The specimen collection materials, transport medium, and/or testing methodology utilized in the production of these test results have been proven to be reliable in a limited validation with an abbreviated program under the Emergency Utilization Authorization provided by the FDA  Testing reported as "Presumptive positive" will be confirmed with secondary testing to ensure result accuracy  Clinical caution and judgement should be used with the interpretation of these results with consideration of the clinical impression and other laboratory testing  Testing reported as "Positive" or "Negative" has been proven to be accurate according to standard laboratory validation requirements  All testing is performed with control materials showing appropriate reactivity at standard intervals  UA w Reflex to Microscopic w Reflex to Culture [[id number]] Collected: 06/30/21 1540    Lab Status: Final result Specimen: Urine, Clean Catch Updated: 06/30/21 1603     Color, UA Yellow     Clarity, UA Clear     Specific Gravity, UA 1 010     pH, UA 5 5     Leukocytes, UA Negative     Nitrite, UA Negative     Protein, UA Negative mg/dl      Glucose, UA Negative mg/dl      Ketones, UA Negative mg/dl      Urobilinogen, UA 0 2 E U /dl      Bilirubin, UA Negative     Blood, UA Negative    NT-BNP PRO [[id number]]  (Normal) Collected: 06/30/21 1240    Lab Status: Final result Specimen: Blood Updated: 06/30/21 1320     NT-proBNP 90 pg/mL     Lipase [[id number]]  (Normal) Collected: 06/30/21 1240    Lab Status: Final result Specimen: Blood Updated: 06/30/21 1320     Lipase 101 u/L     Magnesium [[id number]]  (Normal) Collected: 06/30/21 1240    Lab Status: Final result Specimen: Blood Updated: 06/30/21 1320     Magnesium 1 8 mg/dL     Lactic acid, plasma [[id number]]  (Normal) Collected: 06/30/21 1240    Lab Status: Final result Specimen: Blood Updated: 06/30/21 1316     LACTIC ACID 1 9 mmol/L     Narrative:      Result may be elevated if tourniquet was used during collection      Troponin I [[id number]]  (Normal) Collected: 06/30/21 1240    Lab Status: Final result Specimen: Blood Updated: 06/30/21 1315     Troponin I <0 02 ng/mL     Comprehensive metabolic panel [[id number]] Collected: 06/30/21 1240    Lab Status: Final result Specimen: Blood Updated: 06/30/21 1313     Sodium 141 mmol/L      Potassium 4 1 mmol/L      Chloride 106 mmol/L      CO2 25 mmol/L      ANION GAP 10 mmol/L      BUN 11 mg/dL      Creatinine 1 14 mg/dL      Glucose 107 mg/dL      Calcium 9 0 mg/dL      AST 16 U/L      ALT 18 U/L      Alkaline Phosphatase 48 U/L      Total Protein 6 8 g/dL      Albumin 3 7 g/dL      Total Bilirubin 0 30 mg/dL      eGFR 45 ml/min/1 73sq m     Narrative:      National Kidney Disease Foundation guidelines for Chronic Kidney Disease (CKD):     Stage 1 with normal or high GFR (GFR > 90 mL/min/1 73 square meters)    Stage 2 Mild CKD (GFR = 60-89 mL/min/1 73 square meters)    Stage 3A Moderate CKD (GFR = 45-59 mL/min/1 73 square meters)    Stage 3B Moderate CKD (GFR = 30-44 mL/min/1 73 square meters)    Stage 4 Severe CKD (GFR = 15-29 mL/min/1 73 square meters)    Stage 5 End Stage CKD (GFR <15 mL/min/1 73 square meters)  Note: GFR calculation is accurate only with a steady state creatinine    Protime-INR [[id number]]  (Normal) Collected: 06/30/21 1240    Lab Status: Final result Specimen: Blood Updated: 06/30/21 1310     Protime 13 9 seconds      INR 1 09    APTT [[id number]]  (Normal) Collected: 06/30/21 1240    Lab Status: Final result Specimen: Blood Updated: 06/30/21 1310     PTT 32 seconds     CBC and differential [[id number]]  (Abnormal) Collected: 06/30/21 1240    Lab Status: Final result Specimen: Blood Updated: 06/30/21 1302     WBC 5 60 Thousand/uL      RBC 3 84 Million/uL      Hemoglobin 11 2 g/dL      Hematocrit 37 1 %      MCV 97 fL      MCH 29 2 pg      MCHC 30 2 g/dL      RDW 16 9 %      MPV 9 2 fL      Platelets 022 Thousands/uL      nRBC 0 /100 WBCs      Neutrophils Relative 75 %      Immat GRANS % 1 %      Lymphocytes Relative 16 %      Monocytes Relative 6 %      Eosinophils Relative 1 %      Basophils Relative 1 %      Neutrophils Absolute 4 27 Thousands/µL      Immature Grans Absolute 0 04 Thousand/uL      Lymphocytes Absolute 0 87 Thousands/µL      Monocytes Absolute 0 35 Thousand/µL      Eosinophils Absolute 0 04 Thousand/µL      Basophils Absolute 0 03 Thousands/µL              EKG: normal sinus rhythm, non-specific st,t changes, no acute ischemia    XR chest portable   Final Result by Andrés Oates MD (07/02 1110)      No acute cardiopulmonary disease  Workstation performed: [id number]         US right upper quadrant   Final Result by India Frost MD (07/01 0539)      Postcholecystectomy  No biliary dilation  Workstation performed: [id number]         CT chest abdomen pelvis wo contrast   Final Result by Felicia Caruso MD (06/30 1618)      1  No acute CT findings in the chest, abdomen, or pelvis within the limits of unenhanced technique  2   Large hiatal hernia  Workstation performed: [id number]         CT head without contrast   Final Result by Ayla Barber MD (06/30 1446)      No acute intracranial abnormality or significant change from prior  Workstation performed: [id number]         X-ray chest 1 view portable   Final Result by Patricia Elliott MD (06/30 1412)      Questionable area of infiltrate medially in the right upper lobe  Hiatal hernia  The study was marked in [facility] for immediate notification                 Workstation performed: [id number]                    Procedures  Procedures         ED Course             HEART Risk Score      Most Recent Value   Heart Score Risk Calculator   History  1 Filed at: 06/30/2021 1428   ECG  1 Filed at: 06/30/2021 1428   Age  2 Filed at: 06/30/2021 1428   Risk Factors  1 Filed at: 06/30/2021 1428   Troponin  0 Filed at: 06/30/2021 1428   HEART Score  5 Filed at: 06/30/2021 1428                      SBIRT 20yo+      Most Recent Value   SBIRT (22 yo +)   In order to provide better care to our patients, we are screening all of our patients for alcohol and drug use  Would it be okay to ask you these screening questions? Yes Filed at: 06/30/2021 1422   Initial Alcohol Screen: US AUDIT-C    1  How often do you have a drink containing alcohol?  0 Filed at: 06/30/2021 1422   2  How many drinks containing alcohol do you have on a typical day you are drinking? 0 Filed at: 06/30/2021 1422   3a  Male UNDER 65: How often do you have five or more drinks on one occasion? 0 Filed at: 06/30/2021 1422   3b  FEMALE Any Age, or MALE 65+: How often do you have 4 or more drinks on one occassion? 0 Filed at: 06/30/2021 1422   Audit-C Score  0 Filed at: 06/30/2021 1422   JOSE: How many times in the past year have you    Used an illegal drug or used a prescription medication for non-medical reasons?   Never Filed at: 06/30/2021 1422        JUSTIN Risk Score      Most Recent Value   Age >= 72  1 Filed at: 06/30/2021 1921   Known CAD (stenosis >= 50%)  0 Filed at: 06/30/2021 1921   Recent (<=24 hrs) Service Angina  1 Filed at: 06/30/2021 1921   ST Deviation >= 0 5 mm  0 Filed at: 06/30/2021 1921   3+ CAD Risk Factors (FHx, HTN, HLP, DM, Smoker)  0 Filed at: 06/30/2021 1921   Aspirin Use Past 7 Days  0 Filed at: 06/30/2021 1921   Elevated Cardiac Markers  0 Filed at: 06/30/2021 1921   JUSTIN Risk Score (Calculated)  2 Filed at: 06/30/2021 9485        Wells' Criteria for PE      Most Recent Value   Wells' Criteria for PE   Clinical signs and symptoms of DVT  0 Filed at: 06/30/2021 1428   PE is primary diagnosis or equally likely  3 Filed at: 06/30/2021 1428   HR >100  0 Filed at: 06/30/2021 1428   Immobilization at least 3 days or Surgery in the previous 4 weeks  0 Filed at: 06/30/2021 1428   Previous, objectively diagnosed PE or DVT  0 Filed at: 06/30/2021 1428   Hemoptysis  0 Filed at: 06/30/2021 1428   Malignancy with treatment within 6 months or palliative  0 Filed at: 06/30/2021 1428   Raghav' Criteria Total  3 Filed at: 06/30/2021 1428                [facility]  Number of Diagnoses or Management Options  Epigastric pain  Diagnosis management comments: IMP: viral gastro versus viral syndrome, uti, GERD, anxiety, electrolyte abnormality, medication side affect, musc pain  Doubt acs, pe, dissection, tamponade, cva, bacteremia, surgical abd process  Plan: cardiac labs, ekg, cxr, ct head, give ivf and po narcotic pain meds prn   - ekg no acute ischemia  - labs no acute  - ct scans show a large hiatal hernia  - urinalysis no acute  - Pt with hiatal hernia and intractable abd pain  Will admit to medicine           Amount and/or Complexity of Data Reviewed  Clinical lab tests: ordered and reviewed  Tests in the radiology section of CPT®: ordered and reviewed  Tests in the medicine section of CPT®: ordered and reviewed  Decide to obtain previous medical records or to obtain history from someone other than the patient: yes  Obtain history from someone other than the patient: yes (EMS providers)  Review and summarize past medical records: yes  Discuss the patient with other providers: yes (Hospitalist and GI on-call)  Independent visualization of images, tracings, or specimens: yes    Risk of Complications, Morbidity, and/or Mortality  Presenting problems: high  Diagnostic procedures: high  Management options: high    Patient Progress  Patient progress: stable      Disposition  Final diagnoses:   Epigastric pain   Large hiatal hernia   Unintentional weight loss of more than 7 5% body weight within 3 months     Time reflects when diagnosis was documented in both MDM as applicable and the Disposition within this note     Time User Action Codes Description Comment    6/30/2021  4:47 PM Miriam Ortez Add [R10 13] Epigastric pain     6/30/2021  6:38 PM Ephraim Bustamante Add [K44 9] Large hiatal hernia     6/30/2021  6:41 PM Ketty Bustamante Way [R63 4] Unintentional weight loss of more than 7 5% body weight within 3 months     7/2/2021  1:00 PM Arleene Drown Modify [R10 13] Epigastric pain     7/2/2021  1:00 PM Arleene Drown Modify [K44 9] Large hiatal hernia     7/2/2021  1:00 PM Arleene Drown Modify [R63 4] Unintentional weight loss of more than 7 5% body weight within 3 months     7/2/2021  4:22 PM Susa Roof Modify [R10 13] Epigastric pain     7/2/2021  4:22 PM Susa Roof Modify [K44 9] Large hiatal hernia     7/2/2021  4:22 PM Ty Core, Ayanaie Modify [R63 4] Unintentional weight loss of more than 7 5% body weight within 3 months     7/2/2021  4:22 PM Ty Core, Rainie Add [E44 0] Moderate protein-calorie malnutrition [facility])       ED Disposition     ED Disposition Condition Date/Time Comment    Admit Stable Wed Jun 30, 2021  4:46 PM Case was discussed with Dr Sade Harden and the patient's admission status was agreed to be Admission Status: inpatient status to the service of Dr Sade Harden  Follow-up Information     Follow up With Specialties Details Why [address]  Follow up Follow up within 1 week of discharge   [address] [facility]  [phone]            Discharge Medication List as of 7/2/2021  4:52 PM      START taking these medications    Details   mirtazapine (REMERON) 15 mg tablet Take 1 tablet (15 mg total) by mouth daily at bedtime, Starting Fri 7/2/2021, Normal         CONTINUE these medications which have NOT CHANGED    Details   acetaminophen (TYLENOL) 500 mg tablet Take 500 mg by mouth every 6 (six) hours as needed for mild pain, Historical Med      atorvastatin (LIPITOR) 20 mg tablet Take 20 mg by mouth daily, Historical Med      baclofen 10 mg tablet Take 10 mg by mouth 3 (three) times a day, Historical Med      ferrous sulfate 325 (65 Fe) mg tablet Take by mouth daily with breakfast, Historical Med      insulin aspart (NovoLOG) 100 units/mL injection Inject under the skin, Historical Med      levothyroxine 75 mcg tablet Take 75 mcg by mouth daily, Historical Med      metFORMIN (GLUCOPHAGE) 500 mg tablet Take 500 mg by mouth 2 (two) times a day with meals, Historical Med      ondansetron (ZOFRAN-ODT) 4 mg disintegrating tablet Take 4 mg by mouth every 6 (six) hours as needed for nausea or vomiting, Historical Med      pantoprazole (PROTONIX) 40 mg tablet Take 40 mg by mouth daily, Historical Med      nitroglycerin (NITROSTAT) 0 4 mg SL tablet Place 0 4 mg under the tongue every 5 (five) minutes as needed for chest pain, Historical Med           Outpatient Discharge Orders   Discharge Diet     Activity as tolerated       PDMP Review     None          ED Provider  Electronically Signed by           Kirstin Maki DO  07/05/21 2795

## 2021-06-30 NOTE — H&P
H&P Exam - Gage Cartagena 79 y o  female MRN: [id number]    Unit/Bed#: 400-01 Encounter: [id number]    Assessment: Snow Glynn is an 80 y/o woman with past medical history of hypothyroidism, diabetes, hyperlipidemia, hypertension is being admitted to the general medical-surgical floor for epigastric pain with CT results showing large hiatal hernia  She is currently hemodynamically stable, afebrile and in no acute respiratory distress  Will most likely spine greater than 2 midnights  Case discussed Dr Bass Dub:  * Epigastric pain  Assessment & Plan  Patient presented to the ED with abdominal pain-was seen on 02/14/2021 for 5 days of epigastric pain but also had a fall and was discharged from the ED  This was followed by an upper GI bleed at [facility] on 04/16/2021  She was discharged and was to be scheduled with an EGD, colonoscopy  She was transfused 2 units of PRBC's  No account of follow-up  Ed course:  Vitals: WNL  Labs: WNL  Imaging: Ct abd/pelvis:  Large hiatal hernia  Micro:  COVID negative  Meds:  Zofran, Oxy, Protonix, IV fluids, Carafate    clears  IVF  Protonix  Tylenol, Tramadol, morphine for pain control    RUQ us  Consult GI for possible EGD    Large hiatal hernia  Assessment & Plan  Found incidentally on CT abdomen pelvis:1   No acute CT findings in the chest, abdomen, or pelvis within the limits of unenhanced technique  2   Large hiatal hernia    Unintentional weight loss of more than 7 5% body weight within 3 months  Assessment & Plan  Most likely secondary to gradual increase in abdominal pain  Nutrition consult    Other specified hypothyroidism  Assessment & Plan  Continue home medication    Type 2 diabetes mellitus without complication, without long-term current use of insulin (HCC)  Assessment & Plan  Lab Results   Component Value Date    HGBA1C 6 3 (H) 04/16/2021       No results for input(s): POCGLU in the last 72 hours      Blood Sugar Average: Last 72 hrs:   at goal  Insulin sliding scale with Accu-Cheks  Hypoglycemic protocol    Dyslipidemia  Assessment & Plan  Lipid panel  Continue Lipitor    Essential hypertension  Assessment & Plan  Currently stable  Continue to monitor  Continue home medication        History of Present Illness   Maday Holley is an 80 y/o woman with past medical history of hypothyroidism, diabetes, hyperlipidemia, hypertension that was seen on 02/14/2021 for 5 days of epigastric pain but also had a fall and was discharged from the ED  This was followed by an upper GI bleed at [facility] on 04/16/2021  She was discharged and was to be scheduled with an EGD, colonoscopy  She was transfused 2 units of PRBC's  No account of follow-up  She presented to the ED with abdominal pain that started approximately 1-2 years ago that has gradually been worsening  She describes the pain as a shooting burning pain that starts in the left lateral chest and radiates to the epigastric area and upwards through the sternum  She endorses increase of pain with heavy lifting as well as foods  Patient did not recall previous ED visits  She has had a decrease in appetite  She states that she has been taking over-the-counter medications however does not recall what they were  She denies episodes of emesis, sick contacts, diarrhea and constipation  All other symptoms on review of systems was negative  Ed course:  Vitals: WNL  Labs: WNL  Imaging: Ct abd/pelvis:  Large hiatal hernia  Micro:  COVID negative  Meds:  Zofran, Oxy, Protonix, IV fluids, Carafate    Review of Systems   All other systems reviewed and are negative  Historical Information   Past Medical History:   Diagnosis Date    Arthritis     Diabetes mellitus ([facility] [address] )      History reviewed  No pertinent surgical history    Social History   Social History     Substance and Sexual Activity   Alcohol Use Never     Social History     Substance and Sexual Activity   Drug Use Never     Social History     Tobacco Use   Smoking Status Never Smoker   Smokeless Tobacco Never Used        E-Cigarette/Vaping Substances       Family History: History reviewed  No pertinent family history  Meds/Allergies   PTA meds:   Prior to Admission Medications   Prescriptions Last Dose Informant Patient Reported?  Taking?   acetaminophen (TYLENOL) 500 mg tablet Past Week at Unknown time  Yes Yes   Sig: Take 500 mg by mouth every 6 (six) hours as needed for mild pain   atorvastatin (LIPITOR) 20 mg tablet 6/29/2021 at Unknown time  Yes Yes   Sig: Take 20 mg by mouth daily   baclofen 10 mg tablet 6/29/2021 at Unknown time  Yes Yes   Sig: Take 10 mg by mouth 3 (three) times a day   ferrous sulfate 325 (65 Fe) mg tablet 6/29/2021 at Unknown time  Yes Yes   Sig: Take by mouth daily with breakfast   insulin aspart (NovoLOG) 100 units/mL injection 6/29/2021 at Unknown time  Yes Yes   Sig: Inject under the skin   levothyroxine 75 mcg tablet 6/29/2021 at Unknown time  Yes Yes   Sig: Take 75 mcg by mouth daily   metFORMIN (GLUCOPHAGE) 500 mg tablet 6/29/2021 at Unknown time  Yes Yes   Sig: Take 500 mg by mouth 2 (two) times a day with meals   nitroglycerin (NITROSTAT) 0 4 mg SL tablet More than a month at Unknown time  Yes No   Sig: Place 0 4 mg under the tongue every 5 (five) minutes as needed for chest pain   ondansetron (ZOFRAN-ODT) 4 mg disintegrating tablet Past Month at Unknown time  Yes Yes   Sig: Take 4 mg by mouth every 6 (six) hours as needed for nausea or vomiting   pantoprazole (PROTONIX) 40 mg tablet 6/29/2021 at Unknown time  Yes Yes   Sig: Take 40 mg by mouth daily      Facility-Administered Medications: None     Allergies   Allergen Reactions    Dye [Iodinated Diagnostic Agents] Nausea Only    Penicillins Other (See Comments)     "dizzy, nauseous"       Objective   First Vitals:   Blood Pressure: (!) 192/95 (06/30/21 1245)  Pulse: 80 (06/30/21 1245)  Temperature: 97 7 °F (36 5 °C) (06/30/21 1245)  Temp Source: Temporal (06/30/21 1245)  Respirations: 18 (06/30/21 1245)  Height: 5' 4" (162 6 cm) (06/30/21 1715)  Weight - Scale: 61 7 kg (136 lb 0 4 oz) (06/30/21 1715)  SpO2: 98 % (06/30/21 1245)    Current Vitals:   Blood Pressure: 152/79 (06/30/21 1715)  Pulse: 68 (06/30/21 1715)  Temperature: 97 9 °F (36 6 °C) (06/30/21 1715)  Temp Source: Oral (06/30/21 1715)  Respirations: 16 (06/30/21 1715)  Height: 5' 4" (162 6 cm) (06/30/21 1715)  Weight - Scale: 61 7 kg (136 lb 0 4 oz) (06/30/21 1715)  SpO2: 99 % (06/30/21 1715)      Intake/Output Summary (Last 24 hours) at 6/30/2021 1843  Last data filed at 6/30/2021 1548  Gross per 24 hour   Intake 1000 ml   Output --   Net 1000 ml       Invasive Devices     Peripheral Intravenous Line            Peripheral IV 06/30/21 Right Antecubital <1 day                Physical Exam  Vitals and nursing note reviewed  Constitutional:       Appearance: Normal appearance  She is normal weight  HENT:      Head: Normocephalic and atraumatic  Cardiovascular:      Rate and Rhythm: Normal rate and regular rhythm  Pulses: Normal pulses  Heart sounds: Normal heart sounds  Pulmonary:      Effort: Pulmonary effort is normal       Breath sounds: Normal breath sounds  Abdominal:      General: Abdomen is flat  Bowel sounds are normal       Palpations: Abdomen is soft  Tenderness: There is abdominal tenderness (Epigastric area and left upper quadrant)  Musculoskeletal:      Right lower leg: No edema  Left lower leg: No edema  Skin:     General: Skin is warm  Neurological:      General: No focal deficit present  Mental Status: She is alert and oriented to person, place, and time  Sensory: No sensory deficit           Lab Results:   Results for orders placed or performed during the hospital encounter of 06/30/21   NOVEL CORONAVIRUS (COVID-19), PCR [facility]    Specimen: Nasopharyngeal Swab   Result Value Ref Range    SARS-CoV-2 Negative Negative   CBC and differential   Result Value Ref Range    WBC 5 60 4 31 - 10 16 Thousand/uL    RBC 3 84 3 81 - 5 12 Million/uL    Hemoglobin 11 2 (L) 11 5 - 15 4 g/dL    Hematocrit 37 1 34 8 - 46 1 %    MCV 97 82 - 98 fL    MCH 29 2 26 8 - 34 3 pg    MCHC 30 2 (L) 31 4 - 37 4 g/dL    RDW 16 9 (H) 11 6 - 15 1 %    MPV 9 2 8 9 - 12 7 fL    Platelets 419 762 - 141 Thousands/uL    nRBC 0 /100 WBCs    Neutrophils Relative 75 43 - 75 %    Immat GRANS % 1 0 - 2 %    Lymphocytes Relative 16 14 - 44 %    Monocytes Relative 6 4 - 12 %    Eosinophils Relative 1 0 - 6 %    Basophils Relative 1 0 - 1 %    Neutrophils Absolute 4 27 1 85 - 7 62 Thousands/µL    Immature Grans Absolute 0 04 0 00 - 0 20 Thousand/uL    Lymphocytes Absolute 0 87 0 60 - 4 47 Thousands/µL    Monocytes Absolute 0 35 0 17 - 1 22 Thousand/µL    Eosinophils Absolute 0 04 0 00 - 0 61 Thousand/µL    Basophils Absolute 0 03 0 00 - 0 10 Thousands/µL   Troponin I   Result Value Ref Range    Troponin I <0 02 <=0 04 ng/mL   NT-BNP PRO   Result Value Ref Range    NT-proBNP 90 <450 pg/mL   Comprehensive metabolic panel   Result Value Ref Range    Sodium 141 136 - 145 mmol/L    Potassium 4 1 3 5 - 5 3 mmol/L    Chloride 106 100 - 108 mmol/L    CO2 25 21 - 32 mmol/L    ANION GAP 10 4 - 13 mmol/L    BUN 11 5 - 25 mg/dL    Creatinine 1 14 0 60 - 1 30 mg/dL    Glucose 107 65 - 140 mg/dL    Calcium 9 0 8 3 - 10 1 mg/dL    AST 16 5 - 45 U/L    ALT 18 12 - 78 U/L    Alkaline Phosphatase 48 46 - 116 U/L    Total Protein 6 8 6 4 - 8 2 g/dL    Albumin 3 7 3 5 - 5 0 g/dL    Total Bilirubin 0 30 0 20 - 1 00 mg/dL    eGFR 45 ml/min/1 73sq m   Lipase   Result Value Ref Range    Lipase 101 73 - 393 u/L   Magnesium   Result Value Ref Range    Magnesium 1 8 1 6 - 2 6 mg/dL   Protime-INR   Result Value Ref Range    Protime 13 9 11 6 - 14 5 seconds    INR 1 09 0 84 - 1 19   APTT   Result Value Ref Range    PTT 32 23 - 37 seconds   Lactic acid, plasma   Result Value Ref Range    LACTIC ACID 1 9 0 5 - 2 0 mmol/L   UA w Reflex to Microscopic w Reflex to Culture    Specimen: Urine, Clean Catch   Result Value Ref Range    Color, UA Yellow     Clarity, UA Clear     Specific [place], UA 1 010 1 003 - 1 030    pH, UA 5 5 4 5, 5 0, 5 5, 6 0, 6 5, 7 0, 7 5, 8 0    Leukocytes, UA Negative Negative    Nitrite, UA Negative Negative    Protein, UA Negative Negative mg/dl    Glucose, UA Negative Negative mg/dl    Ketones, UA Negative Negative mg/dl    Urobilinogen, UA 0 2 0 2, 1 0 E U /dl E U /dl    Bilirubin, UA Negative Negative    Blood, UA Negative Negative   Type and screen   Result Value Ref Range    ABO Grouping O     Rh Factor Positive     Antibody Screen Negative     Specimen Expiration Date [id number]    ABORh Recheck - Contact Blood Bank Prior to Collection   Result Value Ref Range    ABO Grouping O     Rh Factor Positive        Imaging:   CT chest abdomen pelvis wo contrast   Final Result      1  No acute CT findings in the chest, abdomen, or pelvis within the limits of unenhanced technique  2   Large hiatal hernia  Workstation performed: [id number]         CT head without contrast   Final Result      No acute intracranial abnormality or significant change from prior  Workstation performed: [id number]         X-ray chest 1 view portable   Final Result      Questionable area of infiltrate medially in the right upper lobe  Hiatal hernia  The study was marked in [place] [id number] for immediate notification  Workstation performed: [id number]          right upper quadrant    (Results Pending)       Code Status: Level 1 - Full Code  Advance Directive and Living Will:      Power of :    POLST:      Counseling / Coordination of Care: Total floor / unit time spent today 35 minutes

## 2021-06-30 NOTE — ASSESSMENT & PLAN NOTE
Patient presented to the ED with abdominal pain-was seen on 02/14/2021 for 5 days of epigastric pain but also had a fall and was discharged from the ED  This was followed by an upper GI bleed at [facility] on 04/16/2021  She was discharged and was to be scheduled with an EGD, colonoscopy  She was transfused 2 units of PRBC's  No account of follow-up  Ed course:  Vitals: WNL  Labs: WNL  Imaging: Ct abd/pelvis:  Large hiatal hernia  Micro:  COVID negative  Meds:  Zofran, Oxy, Protonix, IV fluids, Carafate    clears  IVF  Protonix  Tylenol, Tramadol, morphine for pain control    RUQ US - no biliary dilation, post cholecystectomy  Consult GI pending - EGD planned for tomorrow

## 2021-06-30 NOTE — ED TRIAGE NOTES
Pt to ER for dizziness  Pt working outside for the last 3-4 days  Pt with persistent epigastric pain x 1 year  Pt reports decreased PO intake  Pt with   EMS giving 300 mL NS IV  Pt with Hx DM, CA, anemia with blood/iron infusions  Refued colonoscopy x 4 months ago

## 2021-06-30 NOTE — ASSESSMENT & PLAN NOTE
Found incidentally on CT abdomen pelvis:1   No acute CT findings in the chest, abdomen, or pelvis within the limits of unenhanced technique   2   Large hiatal hernia

## 2021-07-01 ENCOUNTER — APPOINTMENT (INPATIENT)
Dept: NON INVASIVE DIAGNOSTICS | Facility: HOSPITAL | Age: 81
DRG: 392 | End: 2021-07-01
Payer: MEDICARE

## 2021-07-01 PROBLEM — E44.0 MODERATE PROTEIN-CALORIE MALNUTRITION (HCC): Status: ACTIVE | Noted: 2021-07-01

## 2021-07-01 LAB
ALBUMIN SERPL BCP-MCNC: 3.1 G/DL (ref 3.5–5)
ALP SERPL-CCNC: 42 U/L (ref 46–116)
ALT SERPL W P-5'-P-CCNC: 14 U/L (ref 12–78)
ANION GAP SERPL CALCULATED.3IONS-SCNC: 6 MMOL/L (ref 4–13)
AST SERPL W P-5'-P-CCNC: 15 U/L (ref 5–45)
ATRIAL RATE: 91 BPM
BASOPHILS # BLD AUTO: 0.03 THOUSANDS/ΜL (ref 0–0.1)
BASOPHILS NFR BLD AUTO: 1 % (ref 0–1)
BILIRUB SERPL-MCNC: 0.38 MG/DL (ref 0.2–1)
BUN SERPL-MCNC: 6 MG/DL (ref 5–25)
CALCIUM ALBUM COR SERPL-MCNC: 9.3 MG/DL (ref 8.3–10.1)
CALCIUM SERPL-MCNC: 8.6 MG/DL (ref 8.3–10.1)
CHLORIDE SERPL-SCNC: 108 MMOL/L (ref 100–108)
CHOLEST SERPL-MCNC: 178 MG/DL (ref 50–200)
CK SERPL-CCNC: 75 U/L (ref 26–192)
CO2 SERPL-SCNC: 29 MMOL/L (ref 21–32)
CREAT SERPL-MCNC: 1 MG/DL (ref 0.6–1.3)
EOSINOPHIL # BLD AUTO: 0.1 THOUSAND/ΜL (ref 0–0.61)
EOSINOPHIL NFR BLD AUTO: 3 % (ref 0–6)
ERYTHROCYTE [DISTWIDTH] IN BLOOD BY AUTOMATED COUNT: 16.6 % (ref 11.6–15.1)
EST. AVERAGE GLUCOSE BLD GHB EST-MCNC: 103 MG/DL
GFR SERPL CREATININE-BSD FRML MDRD: 53 ML/MIN/1.73SQ M
GLUCOSE SERPL-MCNC: 107 MG/DL (ref 65–140)
GLUCOSE SERPL-MCNC: 113 MG/DL (ref 65–140)
GLUCOSE SERPL-MCNC: 88 MG/DL (ref 65–140)
GLUCOSE SERPL-MCNC: 92 MG/DL (ref 65–140)
GLUCOSE SERPL-MCNC: 92 MG/DL (ref 65–140)
GLUCOSE SERPL-MCNC: 97 MG/DL (ref 65–140)
HBA1C MFR BLD: 5.2 %
HCT VFR BLD AUTO: 35.4 % (ref 34.8–46.1)
HDLC SERPL-MCNC: 61 MG/DL
HGB BLD-MCNC: 10.6 G/DL (ref 11.5–15.4)
IMM GRANULOCYTES # BLD AUTO: 0.01 THOUSAND/UL (ref 0–0.2)
IMM GRANULOCYTES NFR BLD AUTO: 0 % (ref 0–2)
LACTATE SERPL-SCNC: 1 MMOL/L (ref 0.5–2)
LDLC SERPL CALC-MCNC: 81 MG/DL (ref 0–100)
LYMPHOCYTES # BLD AUTO: 1.16 THOUSANDS/ΜL (ref 0.6–4.47)
LYMPHOCYTES NFR BLD AUTO: 35 % (ref 14–44)
MAGNESIUM SERPL-MCNC: 1.7 MG/DL (ref 1.6–2.6)
MCH RBC QN AUTO: 29.4 PG (ref 26.8–34.3)
MCHC RBC AUTO-ENTMCNC: 29.9 G/DL (ref 31.4–37.4)
MCV RBC AUTO: 98 FL (ref 82–98)
MONOCYTES # BLD AUTO: 0.29 THOUSAND/ΜL (ref 0.17–1.22)
MONOCYTES NFR BLD AUTO: 9 % (ref 4–12)
NEUTROPHILS # BLD AUTO: 1.76 THOUSANDS/ΜL (ref 1.85–7.62)
NEUTS SEG NFR BLD AUTO: 52 % (ref 43–75)
NRBC BLD AUTO-RTO: 0 /100 WBCS
P AXIS: 52 DEGREES
PHOSPHATE SERPL-MCNC: 3.4 MG/DL (ref 2.3–4.1)
PLATELET # BLD AUTO: 210 THOUSANDS/UL (ref 149–390)
PMV BLD AUTO: 9 FL (ref 8.9–12.7)
POTASSIUM SERPL-SCNC: 4.5 MMOL/L (ref 3.5–5.3)
PR INTERVAL: 154 MS
PROCALCITONIN SERPL-MCNC: <0.05 NG/ML
PROT SERPL-MCNC: 6 G/DL (ref 6.4–8.2)
QRS AXIS: 55 DEGREES
QRSD INTERVAL: 84 MS
QT INTERVAL: 362 MS
QTC INTERVAL: 445 MS
RBC # BLD AUTO: 3.6 MILLION/UL (ref 3.81–5.12)
SODIUM SERPL-SCNC: 143 MMOL/L (ref 136–145)
T WAVE AXIS: 40 DEGREES
T4 FREE SERPL-MCNC: 0.8 NG/DL (ref 0.76–1.46)
TRIGL SERPL-MCNC: 179 MG/DL
TROPONIN I SERPL-MCNC: <0.02 NG/ML
TSH SERPL DL<=0.05 MIU/L-ACNC: 5.46 UIU/ML (ref 0.36–3.74)
VENTRICULAR RATE: 91 BPM
WBC # BLD AUTO: 3.35 THOUSAND/UL (ref 4.31–10.16)

## 2021-07-01 PROCEDURE — 85025 COMPLETE CBC W/AUTO DIFF WBC: CPT | Performed by: STUDENT IN AN ORGANIZED HEALTH CARE EDUCATION/TRAINING PROGRAM

## 2021-07-01 PROCEDURE — C9113 INJ PANTOPRAZOLE SODIUM, VIA: HCPCS | Performed by: STUDENT IN AN ORGANIZED HEALTH CARE EDUCATION/TRAINING PROGRAM

## 2021-07-01 PROCEDURE — 97167 OT EVAL HIGH COMPLEX 60 MIN: CPT

## 2021-07-01 PROCEDURE — 84145 PROCALCITONIN (PCT): CPT | Performed by: INTERNAL MEDICINE

## 2021-07-01 PROCEDURE — 84439 ASSAY OF FREE THYROXINE: CPT | Performed by: PHYSICIAN ASSISTANT

## 2021-07-01 PROCEDURE — 83735 ASSAY OF MAGNESIUM: CPT | Performed by: STUDENT IN AN ORGANIZED HEALTH CARE EDUCATION/TRAINING PROGRAM

## 2021-07-01 PROCEDURE — 84484 ASSAY OF TROPONIN QUANT: CPT | Performed by: INTERNAL MEDICINE

## 2021-07-01 PROCEDURE — 83036 HEMOGLOBIN GLYCOSYLATED A1C: CPT | Performed by: STUDENT IN AN ORGANIZED HEALTH CARE EDUCATION/TRAINING PROGRAM

## 2021-07-01 PROCEDURE — 80061 LIPID PANEL: CPT | Performed by: STUDENT IN AN ORGANIZED HEALTH CARE EDUCATION/TRAINING PROGRAM

## 2021-07-01 PROCEDURE — 92610 EVALUATE SWALLOWING FUNCTION: CPT

## 2021-07-01 PROCEDURE — 97163 PT EVAL HIGH COMPLEX 45 MIN: CPT

## 2021-07-01 PROCEDURE — B24BZZZ ULTRASONOGRAPHY OF HEART WITH AORTA: ICD-10-PCS | Performed by: NURSE PRACTITIONER

## 2021-07-01 PROCEDURE — 93010 ELECTROCARDIOGRAM REPORT: CPT | Performed by: INTERNAL MEDICINE

## 2021-07-01 PROCEDURE — 82550 ASSAY OF CK (CPK): CPT | Performed by: INTERNAL MEDICINE

## 2021-07-01 PROCEDURE — 84100 ASSAY OF PHOSPHORUS: CPT | Performed by: STUDENT IN AN ORGANIZED HEALTH CARE EDUCATION/TRAINING PROGRAM

## 2021-07-01 PROCEDURE — 82948 REAGENT STRIP/BLOOD GLUCOSE: CPT

## 2021-07-01 PROCEDURE — 93306 TTE W/DOPPLER COMPLETE: CPT | Performed by: INTERNAL MEDICINE

## 2021-07-01 PROCEDURE — 84443 ASSAY THYROID STIM HORMONE: CPT | Performed by: STUDENT IN AN ORGANIZED HEALTH CARE EDUCATION/TRAINING PROGRAM

## 2021-07-01 PROCEDURE — 80053 COMPREHEN METABOLIC PANEL: CPT | Performed by: STUDENT IN AN ORGANIZED HEALTH CARE EDUCATION/TRAINING PROGRAM

## 2021-07-01 PROCEDURE — 99232 SBSQ HOSP IP/OBS MODERATE 35: CPT | Performed by: PHYSICIAN ASSISTANT

## 2021-07-01 PROCEDURE — 83605 ASSAY OF LACTIC ACID: CPT | Performed by: INTERNAL MEDICINE

## 2021-07-01 PROCEDURE — 93306 TTE W/DOPPLER COMPLETE: CPT

## 2021-07-01 RX ORDER — SODIUM CHLORIDE 9 MG/ML
75 INJECTION, SOLUTION INTRAVENOUS CONTINUOUS
Status: DISPENSED | OUTPATIENT
Start: 2021-07-01 | End: 2021-07-02

## 2021-07-01 RX ADMIN — Medication 3 MG: at 21:00

## 2021-07-01 RX ADMIN — ENOXAPARIN SODIUM 40 MG: 40 INJECTION SUBCUTANEOUS at 08:24

## 2021-07-01 RX ADMIN — MORPHINE SULFATE 1 MG: 2 INJECTION, SOLUTION INTRAMUSCULAR; INTRAVENOUS at 20:02

## 2021-07-01 RX ADMIN — TRAMADOL HYDROCHLORIDE 50 MG: 50 TABLET, FILM COATED ORAL at 08:29

## 2021-07-01 RX ADMIN — LEVOTHYROXINE SODIUM 75 MCG: 75 TABLET ORAL at 05:34

## 2021-07-01 RX ADMIN — ATORVASTATIN CALCIUM 20 MG: 10 TABLET, FILM COATED ORAL at 17:27

## 2021-07-01 RX ADMIN — PANTOPRAZOLE SODIUM 40 MG: 40 INJECTION, POWDER, FOR SOLUTION INTRAVENOUS at 08:24

## 2021-07-01 RX ADMIN — PANTOPRAZOLE SODIUM 40 MG: 40 INJECTION, POWDER, FOR SOLUTION INTRAVENOUS at 20:02

## 2021-07-01 RX ADMIN — SODIUM CHLORIDE 75 ML/HR: 0.9 INJECTION, SOLUTION INTRAVENOUS at 06:32

## 2021-07-01 RX ADMIN — SODIUM CHLORIDE 75 ML/HR: 0.9 INJECTION, SOLUTION INTRAVENOUS at 17:27

## 2021-07-01 NOTE — PHYSICAL THERAPY NOTE
Physical Therapy Evaluation  Patient Name: Ed Worley    [id number]'[id number] Date: 7/1/2021     Problem List  Principal Problem:    Epigastric pain  Active Problems:    Essential hypertension    Dyslipidemia    Type 2 diabetes mellitus without complication, without long-term current use of insulin ([facility] )    Other specified hypothyroidism    Large hiatal hernia    Unintentional weight loss of more than 7 5% body weight within 3 months       Past Medical History  Past Medical History:   Diagnosis Date    Arthritis     Diabetes mellitus ([facility] )         Past Surgical History  History reviewed  No pertinent surgical history  07/01/21 1117   PT Last Visit   PT Visit Date 07/01/21   Note Type   Note type Evaluation   Pain Assessment   Pain Assessment Tool 0-10   Pain Score No Pain   Home Living   Type of Home House   Home Layout Two level; Laundry in basement;Bed/bath upstairs  (0 ACE )   Bathroom Shower/Tub Tub/shower unit   Bathroom Toilet Standard   Bathroom Equipment Grab bars in shower;Grab bars around toilet;Commode   Bathroom Accessibility Accessible   Home Equipment Walker;Cane   Additional Comments no AD at baseline    Prior Function   Level of [place] Independent with ADLs and functional mobility   Lives With CEDU Help From Family  ( adult children )   ADL Assistance Independent   IADLs Independent   Falls in the last 6 months 1 to 4   Vocational Retired   Comments pt drives, son is present and can assist   Restrictions/Precautions   Weight Bearing Precautions Per Order No   Other Precautions Bed Alarm; Chair Alarm; Fall Risk;Multiple lines   General   Family/Caregiver Present No   Cognition   Overall Cognitive Status WFL   Arousal/Participation Alert   Attention Attends with cues to redirect   Orientation Level Oriented X4   Memory Within functional limits   Following Commands Follows one step commands without difficulty   RLE Assessment   RLE Assessment X  (4/5 grossly assessed )   LLE Assessment   LLE Assessment X  (4/5 grossly assessed )   Bed Mobility   Supine to Sit 5  Supervision   Additional items Increased time required;Verbal cues; Bedrails   Additional Comments pt supine in bed on RA at beginning of session    Transfers   Sit to Stand 4  Minimal assistance   Additional items Assist x 1; Increased time required; Bedrails;Verbal cues   Stand to Sit 4  Minimal assistance   Additional items Assist x 1; Armrests; Increased time required;Verbal cues   Additional Comments no AD used    Ambulation/Elevation   Gait pattern Forward Flexion;Narrow PAWAN; Improper Weight shift;Scissoring; Short stride   Gait Assistance 4  Minimal assist   Additional items Assist x 1;Verbal cues   Assistive Device Rolling walker   Distance 250'   Stair Management Assistance Not tested   Balance   Static Sitting Good   Dynamic Sitting Fair +   Static Standing Fair   Dynamic Standing Fair -   Ambulatory Fair -   Endurance Deficit   Endurance Deficit No   Activity Tolerance   Activity Tolerance Patient tolerated treatment well   Assessment   Prognosis Good   Problem List Decreased strength;Decreased range of motion; Impaired balance;Decreased mobility; Decreased safety awareness   Assessment Patient is a 79 y o  female evaluated by Physical Therapy s/p admit to [address] [place] [address],[address] on 6/30/2021 with admitting diagnosis of: Epigastric pain, Weakness and principal problem of: Epigastric pain  PT was consulted to assess patient's functional mobility and discharge needs  Ordered are PT Evaluation and treatment with activity level of: up and OOB as tolerated  Comorbidities affecting patient's physical performance at time of assessment include: arthritis, DM  Personal factors affecting the patient at time of IE include: lives in 2 story home, home alone for periods of time, inability to navigate level surfaces without external assistance, history of fall(s) and impaired safety awareness  Please locate objective findings from PT assessment regarding body systems outlined above  Upon Evaluation, pt performs supine to sit bed mobility with SUP and UE use of bed rail; pt denies dizziness upon upright sitting  Pt performs sit<->stand transfers with min Ax1 and no AD; upon standing, pt displays mod increase in postural instability with noted BLE weakness  Pt required RW and min Ax1 to ambulate 250'  Pt displays narrow PAWAN and decreased safety awareness as she lets go of RW 1x during gait; no LOB experienced  Pt denies SOB or fatigue after session  SpO2 remains WFL on RA  The patient's AM-PAC Basic Mobility Inpatient Short Form Raw Score is 19, Standardized Score is 42 48  A standardized score less than 42 9 suggests the patient may benefit from discharge to post-acute rehabilitation services  Please also refer to the recommendation of the Physical Therapist for safe discharge planning  Pt will benefit from continued PT intervention during LOS to address current deficits, increase LOF, and facilitate safe d/c to next level of care when medically appropriate  D/c recommendation at this time is home with outpatient therapy  Co treatment with OT secondary to complex medical condition of pt, possible A of 2 required to achieve and maintain transitional movements, requiring the need of skilled therapeutic intervention of 2 therapists to achieve delivery of services  Goals   Patient Goals to go home   Short Term Goal #1 pt will perform BLE strengthening exercises to improve functional mobility    Short Term Goal #2 pt will perform all bed mobility and transfers mod (I) with RW and good dynamic balance   LTG Expiration Date 07/15/21   Long Term Goal #1 pt will ambulate 300' mod (I) with RW and good safety awareness   Plan   Treatment/Interventions Functional transfer training;LE strengthening/ROM; Therapeutic exercise; Bed mobility;Gait training; Endurance training   PT Frequency Other (Comment)  (3-5x/wk) Recommendation   PT Discharge Recommendation Home with outpatient rehabilitation   AM-PAC Basic Mobility Inpatient   Turning in Bed Without Bedrails 4   Lying on Back to Sitting on Edge of Flat Bed 3   Moving Bed to Chair 3   Standing Up From Chair 3   Walk in Room 3   Climb 3-5 Stairs 3   Basic Mobility Inpatient Raw Score 19   Basic Mobility Standardized Score 42 48     Pt seated in recliner at end of session with all needs in reach

## 2021-07-01 NOTE — MALNUTRITION/BMI
This medical record reflects one or more clinical indicators suggestive of malnutrition  Malnutrition Findings:   Adult Malnutrition type: Chronic illness  Adult Degree of Malnutrition: Malnutrition of moderate degree (r/t decreased appetite reportedly d/t stress and likely depression as evidenced by <75% energy intake x >1 month and signficant wt  loss of 7 8% x ~2 5 months (136 5# 7/1/21, 148# 4/16/21); treated w/ po diet & supplements )  Malnutrition Characteristics: Inadequate energy, Weight loss    BMI Findings: Body mass index is 23 42 kg/m²  See Nutrition note dated 7/1/21 for additional details  Completed nutrition assessment is viewable in the nutrition documentation

## 2021-07-01 NOTE — CONSULTS
Full nutrition assessment viewable in the nutrition documentation  See also malnutrition note  Patient assessed at high nutrition risk d/t poor intakes with resultant wt  Loss  After discussion with patient, poor appetite appears d/t stress and likely depression  Pt  agreeable to supplements- will add Ensure Clear BID at breakfast and dinner, but likely switch to Ensure Compact BID when diet advanced  When diet advanced suggest low-fat, non-ulcerogenic with small portions depending on results of EGD  Recommended via [employer] to CHAIM, who was agreeable, to consider Remeron d/t likely depression  ST recommended regular texture and consistency  Will follow status  Thank you for the consult

## 2021-07-01 NOTE — PLAN OF CARE
Problem: PHYSICAL THERAPY ADULT  Goal: Performs mobility at highest level of function for planned discharge setting  See evaluation for individualized goals  Description: Treatment/Interventions: Functional transfer training, LE strengthening/ROM, Therapeutic exercise, Bed mobility, Gait training, Endurance training          See flowsheet documentation for full assessment, interventions and recommendations  Note: Prognosis: Good  Problem List: Decreased strength, Decreased range of motion, Impaired balance, Decreased mobility, Decreased safety awareness  Assessment: Patient is a 79 y o  female evaluated by Physical Therapy s/p admit to [address] [place] [address],[address] on 6/30/2021 with admitting diagnosis of: Epigastric pain, Weakness and principal problem of: Epigastric pain  PT was consulted to assess patient's functional mobility and discharge needs  Ordered are PT Evaluation and treatment with activity level of: up and OOB as tolerated  Comorbidities affecting patient's physical performance at time of assessment include: arthritis, DM  Personal factors affecting the patient at time of IE include: lives in 2 story home, home alone for periods of time, inability to navigate level surfaces without external assistance, history of fall(s) and impaired safety awareness  Please locate objective findings from PT assessment regarding body systems outlined above  Upon Evaluation, pt performs supine to sit bed mobility with SUP and UE use of bed rail; pt denies dizziness upon upright sitting  Pt performs sit<->stand transfers with min Ax1 and no AD; upon standing, pt displays mod increase in postural instability with noted BLE weakness  Pt required RW and min Ax1 to ambulate 250'  Pt displays narrow PAWAN and decreased safety awareness as she lets go of RW 1x during gait; no LOB experienced  Pt denies SOB or fatigue after session  SpO2 remains WFL on RA   The patient's AM-PAC Basic Mobility Inpatient Short Form Raw Score is 19, Standardized Score is 42 48  A standardized score less than 42 9 suggests the patient may benefit from discharge to post-acute rehabilitation services  Please also refer to the recommendation of the Physical Therapist for safe discharge planning  Pt will benefit from continued PT intervention during LOS to address current deficits, increase LOF, and facilitate safe d/c to next level of care when medically appropriate  D/c recommendation at this time is home with outpatient therapy  Co treatment with OT secondary to complex medical condition of pt, possible A of 2 required to achieve and maintain transitional movements, requiring the need of skilled therapeutic intervention of 2 therapists to achieve delivery of services  PT Discharge Recommendation: Home with outpatient rehabilitation          See flowsheet documentation for full assessment

## 2021-07-01 NOTE — NURSING NOTE
Multiple attempts insisting pt ring call bell for assistance to BR have failed  Pt continues to get OOB or chair independently and ambulate to BR  Pt steady on her feet but history of fall prior to admission and also with c/o dizziness on admission  Pt insists "I feel better I don't need help walking"   Will continue to encourage use of call bell for assistance and maintain fall alarms

## 2021-07-01 NOTE — SPEECH THERAPY NOTE
Speech-Language Pathology Bedside Swallow Evaluation    Patient Name: Sissy CRAVEN'I Date: 7/1/2021     Problem List  Principal Problem:    Epigastric pain  Active Problems:    Essential hypertension    Dyslipidemia    Type 2 diabetes mellitus without complication, without long-term current use of insulin ([facility] )    Other specified hypothyroidism    Large hiatal hernia    Unintentional weight loss of more than 7 5% body weight within 3 months      Past Medical History  Past Medical History:   Diagnosis Date    Arthritis     Diabetes mellitus ([facility] [address] )        Summary  Pt presented with functional appearing oral and pharyngeal stage swallowing skills with limited materials administered today  Pt was assessed with liquids only due to orders for only clear liquids (epigastric pain)  She tolerated all trials of thin liquid without s/s aspiration, and reported no concerns with solid foods (although not assessed today)  Advance diet per MD orders  No additional ST f/u needed at this time  Please re consult with any changes or concerns  Risk/s for Aspiration: suspect low    Recommended Diet: regular diet and thin liquids (once medically cleared)  Recommended Form of Meds: whole with liquid   Aspiration precautions and swallowing strategies: upright posture  Other Recommendations: Continue frequent oral care      Current Medical Status per H&P 6/30/21  Kieran Rodriguez is an 80 y/o woman with past medical history of hypothyroidism, diabetes, hyperlipidemia, hypertension that was seen on 02/14/2021 for 5 days of epigastric pain but also had a fall and was discharged from the ED  This was followed by an upper GI bleed at [facility] on 04/16/2021  She was discharged and was to be scheduled with an EGD, colonoscopy  She was transfused 2 units of PRBC's  No account of follow-up  She presented to the ED with abdominal pain that started approximately 1-2 years ago that has gradually been worsening    She describes the pain as a shooting burning pain that starts in the left lateral chest and radiates to the epigastric area and upwards through the sternum  She endorses increase of pain with heavy lifting as well as foods  Patient did not recall previous ED visits  She has had a decrease in appetite  She states that she has been taking over-the-counter medications however does not recall what they were  She denies episodes of emesis, sick contacts, diarrhea and constipation  All other symptoms on review of systems was negative  Special Studies:  CT head 6/30/21 IMPRESSION:  No acute intracranial abnormality or significant change from prior  CT chest 6/30/21 LUNGS:  No acute focal consolidation  Mild bibasilar subsegmental atelectasis  Central airways are patent  Scattered calcified granulomata  Social/Education/Vocational Hx:  Pt lives alone    Swallow Information   Current Diet: regular diet and thin liquids   Baseline Diet: regular diet and thin liquids      Baseline Assessment   Behavior/Cognition: alert  Speech/Language Status: able to participate in conversation and able to follow commands  Patient Positioning: upright in chair  Pain Status/Interventions/Response to Interventions: No report of or nonverbal indications of pain  Swallow Mechanism Exam  Facial: symmetrical  Labial: WFL  Lingual: WFL  Velum: symmetrical  Mandible: adequate ROM  Dentition: upper dentures  Vocal quality:clear/adequate   Volitional Cough: strong/productive   Respiratory Status: on RA     Consistencies Assessed and Performance   Consistencies Administered: thin liquids    Oral Stage: WFL for thin liquids  Bolus retrieval, formation and transfer were functional with no significant oral residue noted  No overt s/s reduced oral control  Pharyngeal Stage: WFL for thin liquids  Swallow Mechanics: Swallowing initiation appeared prompt  Laryngeal rise was palpated and judged to be within functional limits   No coughing, throat clearing, change in vocal quality or respiratory status noted today  Esophageal Concerns: epigastric and chest pain      Summary and Recommendations (see above)    Results Reviewed with: patient and RN     Treatment Recommended: No additional ST f/u needed at this time, please re consult with any new changes or concerns

## 2021-07-01 NOTE — CASE MANAGEMENT
Cm met with the patient to evaluate the patients prior function and living situation and any barriers to d/c and form a safe d/c plan  Cm also evaluated the patient for any services in the home or needs for services  CM also discussed with patient that their preferences will be taken into account/consideration  Pt resides at home in a house and states her son takes turns staying with her and going to his girlfriends  0 ACE then 10 steps inside  Pt is independent with her adls/ambulation  No services and re: DME states theres canes, walkers and a wc at the house but pt doesn't use any of these  Pt drives  PCP is Conrado Vitale and pharmacy is [facility] in [facility]  Plans at this time are home on dc with OP follow up  CM will follow and assist in dc planning

## 2021-07-01 NOTE — ASSESSMENT & PLAN NOTE
Malnutrition Findings:   Adult Malnutrition type: Chronic illness  Adult Degree of Malnutrition: Malnutrition of moderate degree (r/t decreased appetite reportedly d/t stress and likely depression as evidenced by <75% energy intake x >1 month and signficant wt  loss of 7 8% x ~2 5 months (136 5# 7/1/21, 148# 4/16/21); treated w/ po diet & supplements )    BMI Findings: Body mass index is 23 42 kg/m²  Remeron considered for appetite stimulation, but this is high risk for possible drug interaction given her current tramadol use

## 2021-07-01 NOTE — PLAN OF CARE
Problem: Potential for Falls  Goal: Patient will remain free of falls  Description: INTERVENTIONS:  - Educate patient/family on patient safety including physical limitations  - Instruct patient to call for assistance with activity   - Consult OT/PT to assist with strengthening/mobility   - Keep Call bell within reach  - Keep bed low and locked with side rails adjusted as appropriate  - Keep care items and personal belongings within reach  - Initiate and maintain comfort rounds  - Make Fall Risk Sign visible to staff  - Offer Toileting every 2 Hours, in advance of need  - Initiate/Maintain bed/chair alarm  - Obtain necessary fall risk management equipment  - Apply yellow socks and bracelet for high fall risk patients  - Consider moving patient to room near nurses station  Outcome: Progressing     Problem: GASTROINTESTINAL - ADULT  Goal: Minimal or absence of nausea and/or vomiting  Description: INTERVENTIONS:  - Administer IV fluids if ordered to ensure adequate hydration  - Maintain NPO status until nausea and vomiting are resolved  - Nasogastric tube if ordered  - Administer ordered antiemetic medications as needed  - Provide nonpharmacologic comfort measures as appropriate  - Advance diet as tolerated, if ordered  - Consider nutrition services referral to assist patient with adequate nutrition and appropriate food choices  Outcome: Progressing  Goal: Maintains or returns to baseline bowel function  Description: INTERVENTIONS:  - Assess bowel function  - Encourage oral fluids to ensure adequate hydration  - Administer IV fluids if ordered to ensure adequate hydration  - Administer ordered medications as needed  - Encourage mobilization and activity  - Consider nutritional services referral to assist patient with adequate nutrition and appropriate food choices  Outcome: Progressing  Goal: Maintains adequate nutritional intake  Description: INTERVENTIONS:  - Monitor percentage of each meal consumed  - Identify factors contributing to decreased intake, treat as appropriate  - Assist with meals as needed  - Monitor I&O, weight, and lab values if indicated  - Obtain nutrition services referral as needed  Outcome: Progressing  Goal: Establish and maintain optimal ostomy function  Description: INTERVENTIONS:  - Assess bowel function  - Encourage oral fluids to ensure adequate hydration  - Administer IV fluids if ordered to ensure adequate hydration   - Administer ordered medications as needed  - Encourage mobilization and activity  - Nutrition services referral to assist patient with appropriate food choices  - Assess stoma site  - Consider wound care consult   Outcome: Progressing  Goal: Oral mucous membranes remain intact  Description: INTERVENTIONS  - Assess oral mucosa and hygiene practices  - Implement preventative oral hygiene regimen  - Implement oral medicated treatments as ordered  - Initiate Nutrition services referral as needed  Outcome: Progressing     Problem: METABOLIC, FLUID AND ELECTROLYTES - ADULT  Goal: Electrolytes maintained within normal limits  Description: INTERVENTIONS:  - Monitor labs and assess patient for signs and symptoms of electrolyte imbalances  - Administer electrolyte replacement as ordered  - Monitor response to electrolyte replacements, including repeat lab results as appropriate  - Instruct patient on fluid and nutrition as appropriate  Outcome: Progressing  Goal: Fluid balance maintained  Description: INTERVENTIONS:  - Monitor labs   - Monitor I/O and WT  - Instruct patient on fluid and nutrition as appropriate  - Assess for signs & symptoms of volume excess or deficit  Outcome: Progressing  Goal: Glucose maintained within target range  Description: INTERVENTIONS:  - Monitor Blood Glucose as ordered  - Assess for signs and symptoms of hyperglycemia and hypoglycemia  - Administer ordered medications to maintain glucose within target range  - Assess nutritional intake and initiate nutrition service referral as needed  Outcome: Progressing     Problem: Nutrition/Hydration-ADULT  Goal: Nutrient/Hydration intake appropriate for improving, restoring or maintaining nutritional needs  Description: Monitor and assess patient's nutrition/hydration status for malnutrition  Collaborate with interdisciplinary team and initiate plan and interventions as ordered  Monitor patient's weight and dietary intake as ordered or per policy  Utilize nutrition screening tool and intervene as necessary  Determine patient's food preferences and provide high-protein, high-caloric foods as appropriate       INTERVENTIONS:  - Monitor oral intake, urinary output, labs, and treatment plans  - Assess nutrition and hydration status and recommend course of action  - Evaluate amount of meals eaten  - Assist patient with eating if necessary   - Allow adequate time for meals  - Recommend/ encourage appropriate diets, oral nutritional supplements, and vitamin/mineral supplements  - Order, calculate, and assess calorie counts as needed  - Recommend, monitor, and adjust tube feedings and TPN/PPN based on assessed needs  - Assess need for intravenous fluids  - Provide specific nutrition/hydration education as appropriate  - Include patient/family/caregiver in decisions related to nutrition  Outcome: Progressing

## 2021-07-01 NOTE — PROGRESS NOTES
[facility] [facility] [address]  Progress Note - Amari Marques 1940, 79 y o  female MRN: [id number]  Unit/Bed#: 400-01 Encounter: [id number]  Primary Care Provider: Jessica Alvarado   Date and time admitted to hospital: 6/30/2021 12:32 PM    * Epigastric pain  Assessment & Plan  Patient presented to the ED with abdominal pain-was seen on 02/14/2021 for 5 days of epigastric pain but also had a fall and was discharged from the ED  This was followed by an upper GI bleed at [facility] on 04/16/2021  She was discharged and was to be scheduled with an EGD, colonoscopy  She was transfused 2 units of PRBC's  No account of follow-up  Ed course:  Vitals: WNL  Labs: WNL  Imaging: Ct abd/pelvis:  Large hiatal hernia  Micro:  COVID negative  Meds:  Zofran, Oxy, Protonix, IV fluids, Carafate    clears  IVF  Protonix  Tylenol, Tramadol, morphine for pain control    RUQ US - no biliary dilation, post cholecystectomy  Consult GI pending - EGD planned for tomorrow  Moderate protein-calorie malnutrition ([facility] [address] )  Assessment & Plan  Malnutrition Findings:   Adult Malnutrition type: Chronic illness  Adult Degree of Malnutrition: Malnutrition of moderate degree (r/t decreased appetite reportedly d/t stress and likely depression as evidenced by <75% energy intake x >1 month and signficant wt  loss of 7 8% x ~2 5 months (136 5# 7/1/21, 148# 4/16/21); treated w/ po diet & supplements )    BMI Findings: Body mass index is 23 42 kg/m²  Remeron considered for appetite stimulation, but this is high risk for possible drug interaction given her current tramadol use  Large hiatal hernia  Assessment & Plan  Found incidentally on CT abdomen pelvis:1   No acute CT findings in the chest, abdomen, or pelvis within the limits of unenhanced technique   2   Large hiatal hernia    Other specified hypothyroidism  Assessment & Plan  Continue home medication    Type 2 diabetes mellitus without complication, without long-term current use of insulin (HCC)  Assessment & Plan  Lab Results   Component Value Date    HGBA1C 6 3 (H) 2021       No results for input(s): POCGLU in the last 72 hours  Blood Sugar Average: Last 72 hrs:   at goal  Insulin sliding scale with Accu-Cheks  Hypoglycemic protocol    Dyslipidemia  Assessment & Plan  Lipid panel - total cholesterol 178, triglycerides 179, HDL 61, LDL 81  Continue Lipitor    Essential hypertension  Assessment & Plan  Currently stable  Continue to monitor  Continue home medication      VTE Pharmacologic Prophylaxis:   Pharmacologic: Enoxaparin (Lovenox)  Mechanical VTE Prophylaxis in Place: Yes    Patient Centered Rounds: I have performed bedside rounds with nursing staff today  Time Spent for Care: 30 minutes  More than 50% of total time spent on counseling and coordination of care as described above  Current Length of Stay: 1 day(s)    Current Patient Status: Inpatient   Certification Statement: The patient will continue to require additional inpatient hospital stay due to need for EGD  Discharge Plan / Estimated Discharge Date: TBD      Code Status: Level 1 - Full Code      Subjective:   Patient is feeling well today  Only attempted clear liquid diet so far  Notes   chronic generalized weakness and numbness  Objective:     Vitals:   Temp (24hrs), Av 6 °F (36 4 °C), Min:97 5 °F (36 4 °C), Max:97 9 °F (36 6 °C)    Temp:  [97 5 °F (36 4 °C)-97 9 °F (36 6 °C)] 97 5 °F (36 4 °C)  HR:  [64-69] 69  Resp:  [16-18] 18  BP: (133-152)/(72-79) 140/72  SpO2:  [98 %-99 %] 99 %  Body mass index is 23 42 kg/m²  Input and Output Summary (last 24 hours): Intake/Output Summary (Last 24 hours) at 2021 1707  Last data filed at 2021 1246  Gross per 24 hour   Intake 1320 ml   Output --   Net 1320 ml       Physical Exam:     Physical Exam  Vitals and nursing note reviewed  Constitutional:       General: She is not in acute distress       Appearance: She is not ill-appearing  HENT:      Head: Normocephalic  Mouth/Throat:      Mouth: Mucous membranes are moist    Cardiovascular:      Rate and Rhythm: Normal rate and regular rhythm  Heart sounds: No murmur heard  Pulmonary:      Effort: Pulmonary effort is normal       Breath sounds: Normal breath sounds  No wheezing  Abdominal:      General: There is no distension  Palpations: Abdomen is soft  Tenderness: There is no abdominal tenderness  Musculoskeletal:      Right lower leg: No edema  Left lower leg: No edema  Skin:     General: Skin is warm and dry  Neurological:      Mental Status: She is oriented to person, place, and time  Psychiatric:      Comments: Flat affect  Additional Data:     Labs:    Results from last 7 days   Lab Units 07/01/21  0446   WBC Thousand/uL 3 35*   HEMOGLOBIN g/dL 10 6*   HEMATOCRIT % 35 4   PLATELETS Thousands/uL 210   NEUTROS PCT % 52   LYMPHS PCT % 35   MONOS PCT % 9   EOS PCT % 3     Results from last 7 days   Lab Units 07/01/21  0446   POTASSIUM mmol/L 4 5   CHLORIDE mmol/L 108   CO2 mmol/L 29   BUN mg/dL 6   CREATININE mg/dL 1 00   CALCIUM mg/dL 8 6   ALK PHOS U/L 42*   ALT U/L 14   AST U/L 15     Results from last 7 days   Lab Units 06/30/21  1240   INR  1 09       * I Have Reviewed All Lab Data Listed Above  * Additional Pertinent Lab Tests Reviewed: All Labs Within Last 24 Hours Reviewed    Imaging:  Imaging Reports Reviewed Today Include: no new imaging to review          Recent Cultures (last 7 days):           Last 24 Hours Medication List:   Current Facility-Administered Medications   Medication Dose Route Frequency Provider Last Rate    acetaminophen  650 mg Oral Q6H PRN Osmin Huang DO      atorvastatin  20 mg Oral Daily Juan Diego Esqueda MD      calcium carbonate  1,000 mg Oral Daily PRN Juan Diego Esqueda MD      enoxaparin  40 mg Subcutaneous Daily Juan Diego Esqueda MD      insulin lispro  1-5 Units Subcutaneous TID AC Troy Obregon MD      insulin lispro  1-5 Units Subcutaneous HS Troy Obregon MD      levothyroxine  75 mcg Oral Daily Troy Obregon MD      melatonin  3 mg Oral HS Troy Obregon MD      morphine injection  1 mg Intravenous Q4H PRN Troy Obregon MD      ondansetron  4 mg Intravenous Q6H PRN Troy Obregon MD      pantoprazole  40 mg Intravenous Q12H Radha Begum MD      polyethylene glycol  17 g Oral Daily PRN Troy Obregon MD      sodium chloride (PF)  3 mL Intravenous Q1H PRN Troy Obregon MD      traMADol  50 mg Oral Q6H PRN Troy Obregon MD          Today, Patient Was Seen By: Dat Ferrera PA-C    ** Please Note: Dragon 360 Dictation voice to text software may have been used in the creation of this document   **

## 2021-07-02 ENCOUNTER — ANESTHESIA EVENT (INPATIENT)
Dept: PERIOP | Facility: HOSPITAL | Age: 81
DRG: 392 | End: 2021-07-02
Payer: MEDICARE

## 2021-07-02 ENCOUNTER — APPOINTMENT (INPATIENT)
Dept: PERIOP | Facility: HOSPITAL | Age: 81
DRG: 392 | End: 2021-07-02
Attending: INTERNAL MEDICINE
Payer: MEDICARE

## 2021-07-02 ENCOUNTER — APPOINTMENT (INPATIENT)
Dept: RADIOLOGY | Facility: HOSPITAL | Age: 81
DRG: 392 | End: 2021-07-02
Payer: MEDICARE

## 2021-07-02 ENCOUNTER — ANESTHESIA (INPATIENT)
Dept: PERIOP | Facility: HOSPITAL | Age: 81
DRG: 392 | End: 2021-07-02
Payer: MEDICARE

## 2021-07-02 VITALS
SYSTOLIC BLOOD PRESSURE: 105 MMHG | DIASTOLIC BLOOD PRESSURE: 70 MMHG | HEIGHT: 64 IN | WEIGHT: 135.8 LBS | OXYGEN SATURATION: 95 % | TEMPERATURE: 97.5 F | HEART RATE: 70 BPM | RESPIRATION RATE: 16 BRPM | BODY MASS INDEX: 23.18 KG/M2

## 2021-07-02 PROBLEM — R07.89 OTHER CHEST PAIN: Status: ACTIVE | Noted: 2021-07-02

## 2021-07-02 LAB
ANION GAP SERPL CALCULATED.3IONS-SCNC: 6 MMOL/L (ref 4–13)
ATRIAL RATE: 72 BPM
BASOPHILS # BLD AUTO: 0.03 THOUSANDS/ΜL (ref 0–0.1)
BASOPHILS NFR BLD AUTO: 1 % (ref 0–1)
BUN SERPL-MCNC: 4 MG/DL (ref 5–25)
CALCIUM SERPL-MCNC: 8.8 MG/DL (ref 8.3–10.1)
CHLORIDE SERPL-SCNC: 107 MMOL/L (ref 100–108)
CO2 SERPL-SCNC: 30 MMOL/L (ref 21–32)
CREAT SERPL-MCNC: 0.91 MG/DL (ref 0.6–1.3)
EOSINOPHIL # BLD AUTO: 0.09 THOUSAND/ΜL (ref 0–0.61)
EOSINOPHIL NFR BLD AUTO: 2 % (ref 0–6)
ERYTHROCYTE [DISTWIDTH] IN BLOOD BY AUTOMATED COUNT: 16.1 % (ref 11.6–15.1)
GFR SERPL CREATININE-BSD FRML MDRD: 59 ML/MIN/1.73SQ M
GLUCOSE SERPL-MCNC: 103 MG/DL (ref 65–140)
GLUCOSE SERPL-MCNC: 189 MG/DL (ref 65–140)
GLUCOSE SERPL-MCNC: 90 MG/DL (ref 65–140)
GLUCOSE SERPL-MCNC: 97 MG/DL (ref 65–140)
HCT VFR BLD AUTO: 38.5 % (ref 34.8–46.1)
HGB BLD-MCNC: 11.6 G/DL (ref 11.5–15.4)
IMM GRANULOCYTES # BLD AUTO: 0.02 THOUSAND/UL (ref 0–0.2)
IMM GRANULOCYTES NFR BLD AUTO: 1 % (ref 0–2)
LYMPHOCYTES # BLD AUTO: 1.03 THOUSANDS/ΜL (ref 0.6–4.47)
LYMPHOCYTES NFR BLD AUTO: 28 % (ref 14–44)
MCH RBC QN AUTO: 29.5 PG (ref 26.8–34.3)
MCHC RBC AUTO-ENTMCNC: 30.1 G/DL (ref 31.4–37.4)
MCV RBC AUTO: 98 FL (ref 82–98)
MONOCYTES # BLD AUTO: 0.27 THOUSAND/ΜL (ref 0.17–1.22)
MONOCYTES NFR BLD AUTO: 7 % (ref 4–12)
NEUTROPHILS # BLD AUTO: 2.29 THOUSANDS/ΜL (ref 1.85–7.62)
NEUTS SEG NFR BLD AUTO: 61 % (ref 43–75)
NRBC BLD AUTO-RTO: 0 /100 WBCS
P AXIS: 41 DEGREES
PLATELET # BLD AUTO: 219 THOUSANDS/UL (ref 149–390)
PMV BLD AUTO: 9 FL (ref 8.9–12.7)
POTASSIUM SERPL-SCNC: 4.4 MMOL/L (ref 3.5–5.3)
PR INTERVAL: 152 MS
PROCALCITONIN SERPL-MCNC: <0.05 NG/ML
QRS AXIS: 27 DEGREES
QRSD INTERVAL: 86 MS
QT INTERVAL: 384 MS
QTC INTERVAL: 420 MS
RBC # BLD AUTO: 3.93 MILLION/UL (ref 3.81–5.12)
SODIUM SERPL-SCNC: 143 MMOL/L (ref 136–145)
T WAVE AXIS: 33 DEGREES
TROPONIN I SERPL-MCNC: <0.02 NG/ML
TROPONIN I SERPL-MCNC: <0.02 NG/ML
VENTRICULAR RATE: 72 BPM
WBC # BLD AUTO: 3.73 THOUSAND/UL (ref 4.31–10.16)

## 2021-07-02 PROCEDURE — 88305 TISSUE EXAM BY PATHOLOGIST: CPT | Performed by: PATHOLOGY

## 2021-07-02 PROCEDURE — 84484 ASSAY OF TROPONIN QUANT: CPT | Performed by: PHYSICIAN ASSISTANT

## 2021-07-02 PROCEDURE — 99223 1ST HOSP IP/OBS HIGH 75: CPT | Performed by: INTERNAL MEDICINE

## 2021-07-02 PROCEDURE — 82948 REAGENT STRIP/BLOOD GLUCOSE: CPT

## 2021-07-02 PROCEDURE — 97116 GAIT TRAINING THERAPY: CPT

## 2021-07-02 PROCEDURE — 0DB68ZX EXCISION OF STOMACH, VIA NATURAL OR ARTIFICIAL OPENING ENDOSCOPIC, DIAGNOSTIC: ICD-10-PCS | Performed by: INTERNAL MEDICINE

## 2021-07-02 PROCEDURE — 93010 ELECTROCARDIOGRAM REPORT: CPT | Performed by: INTERNAL MEDICINE

## 2021-07-02 PROCEDURE — 97535 SELF CARE MNGMENT TRAINING: CPT

## 2021-07-02 PROCEDURE — 88341 IMHCHEM/IMCYTCHM EA ADD ANTB: CPT | Performed by: PATHOLOGY

## 2021-07-02 PROCEDURE — 93005 ELECTROCARDIOGRAM TRACING: CPT

## 2021-07-02 PROCEDURE — 88342 IMHCHEM/IMCYTCHM 1ST ANTB: CPT | Performed by: PATHOLOGY

## 2021-07-02 PROCEDURE — 97530 THERAPEUTIC ACTIVITIES: CPT

## 2021-07-02 PROCEDURE — 71045 X-RAY EXAM CHEST 1 VIEW: CPT

## 2021-07-02 PROCEDURE — 43239 EGD BIOPSY SINGLE/MULTIPLE: CPT | Performed by: INTERNAL MEDICINE

## 2021-07-02 PROCEDURE — 99239 HOSP IP/OBS DSCHRG MGMT >30: CPT | Performed by: PHYSICIAN ASSISTANT

## 2021-07-02 PROCEDURE — 97110 THERAPEUTIC EXERCISES: CPT

## 2021-07-02 PROCEDURE — 85025 COMPLETE CBC W/AUTO DIFF WBC: CPT | Performed by: PHYSICIAN ASSISTANT

## 2021-07-02 PROCEDURE — 80048 BASIC METABOLIC PNL TOTAL CA: CPT | Performed by: PHYSICIAN ASSISTANT

## 2021-07-02 PROCEDURE — 84145 PROCALCITONIN (PCT): CPT | Performed by: INTERNAL MEDICINE

## 2021-07-02 PROCEDURE — C9113 INJ PANTOPRAZOLE SODIUM, VIA: HCPCS | Performed by: STUDENT IN AN ORGANIZED HEALTH CARE EDUCATION/TRAINING PROGRAM

## 2021-07-02 RX ORDER — SODIUM CHLORIDE, SODIUM LACTATE, POTASSIUM CHLORIDE, CALCIUM CHLORIDE 600; 310; 30; 20 MG/100ML; MG/100ML; MG/100ML; MG/100ML
75 INJECTION, SOLUTION INTRAVENOUS CONTINUOUS
Status: DISCONTINUED | OUTPATIENT
Start: 2021-07-02 | End: 2021-07-02 | Stop reason: HOSPADM

## 2021-07-02 RX ORDER — LORAZEPAM 2 MG/ML
0.5 INJECTION INTRAMUSCULAR ONCE
Status: COMPLETED | OUTPATIENT
Start: 2021-07-02 | End: 2021-07-02

## 2021-07-02 RX ORDER — ONDANSETRON 2 MG/ML
4 INJECTION INTRAMUSCULAR; INTRAVENOUS ONCE AS NEEDED
Status: DISCONTINUED | OUTPATIENT
Start: 2021-07-02 | End: 2021-07-02 | Stop reason: HOSPADM

## 2021-07-02 RX ORDER — MIRTAZAPINE 15 MG/1
15 TABLET, FILM COATED ORAL
Qty: 30 TABLET | Refills: 0 | Status: SHIPPED | OUTPATIENT
Start: 2021-07-02

## 2021-07-02 RX ORDER — PROPOFOL 10 MG/ML
INJECTION, EMULSION INTRAVENOUS AS NEEDED
Status: DISCONTINUED | OUTPATIENT
Start: 2021-07-02 | End: 2021-07-02

## 2021-07-02 RX ORDER — SODIUM CHLORIDE, SODIUM LACTATE, POTASSIUM CHLORIDE, CALCIUM CHLORIDE 600; 310; 30; 20 MG/100ML; MG/100ML; MG/100ML; MG/100ML
INJECTION, SOLUTION INTRAVENOUS CONTINUOUS PRN
Status: DISCONTINUED | OUTPATIENT
Start: 2021-07-02 | End: 2021-07-02

## 2021-07-02 RX ADMIN — LORAZEPAM 0.5 MG: 2 INJECTION INTRAMUSCULAR; INTRAVENOUS at 10:15

## 2021-07-02 RX ADMIN — PANTOPRAZOLE SODIUM 40 MG: 40 INJECTION, POWDER, FOR SOLUTION INTRAVENOUS at 10:12

## 2021-07-02 RX ADMIN — INSULIN LISPRO 1 UNITS: 100 INJECTION, SOLUTION INTRAVENOUS; SUBCUTANEOUS at 16:41

## 2021-07-02 RX ADMIN — LEVOTHYROXINE SODIUM 75 MCG: 75 TABLET ORAL at 06:17

## 2021-07-02 RX ADMIN — LIDOCAINE HYDROCHLORIDE 50 MG: 20 INJECTION, SOLUTION INTRAVENOUS at 12:56

## 2021-07-02 RX ADMIN — SODIUM CHLORIDE, SODIUM LACTATE, POTASSIUM CHLORIDE, AND CALCIUM CHLORIDE: .6; .31; .03; .02 INJECTION, SOLUTION INTRAVENOUS at 12:52

## 2021-07-02 RX ADMIN — ENOXAPARIN SODIUM 40 MG: 40 INJECTION SUBCUTANEOUS at 10:13

## 2021-07-02 RX ADMIN — PROPOFOL 70 MG: 10 INJECTION, EMULSION INTRAVENOUS at 12:56

## 2021-07-02 RX ADMIN — ACETAMINOPHEN 650 MG: 325 TABLET ORAL at 10:13

## 2021-07-02 NOTE — ASSESSMENT & PLAN NOTE
Suspect anxiety mediated  Sudden onset associated with SOB, generalized shaking, numbness, lightheadedness  Resolved with 1 time dose of IV ativan  CXR, EKG unremarkable  Troponin negative x 2

## 2021-07-02 NOTE — ANESTHESIA POSTPROCEDURE EVALUATION
Post-Op Assessment Note    CV Status:  Stable    Pain management: adequate     Mental Status:  Alert and awake   Hydration Status:  Euvolemic   PONV Controlled:  Controlled   Airway Patency:  Patent      Post Op Vitals Reviewed: Yes      Staff: Anesthesiologist         No complications documented      /69 (07/02/21 1318)    Temp      Pulse 63 (07/02/21 1318)   Resp 20 (07/02/21 1318)    SpO2 100 % (07/02/21 1318)

## 2021-07-02 NOTE — OCCUPATIONAL THERAPY NOTE
Occupational Therapy Treatment Note     Patient Name: Carmita Pa  FSWYE'[id number] Date: 7/2/2021  Problem List  Principal Problem:    Epigastric pain  Active Problems:    Essential hypertension    Dyslipidemia    Type 2 diabetes mellitus without complication, without long-term current use of insulin ([facility] [address] )    Other specified hypothyroidism    Large hiatal hernia    Moderate protein-calorie malnutrition ([facility] [address] )          07/02/21 1110   OT Last Visit   OT Visit Date 07/02/21   Note Type   Note Type Treatment   Restrictions/Precautions   Weight Bearing Precautions Per Order No   Other Precautions Telemetry; Fall Risk;Bed Alarm   Pain Assessment   Pain Assessment Tool Pain Assessment not indicated - pt denies pain   Pain Score No Pain   ADL   Where Assessed Chair   LB Dressing Assistance 5  Supervision/Setup   LB Dressing Deficit Verbal cueing;Supervision/safety; Increased time to complete;Steadying   LB Dressing Comments Pt donned pants while seated in recliner  Pt able to thread BLE into pants with supervision  Pt stood up to pull pants over hips and tie pants  Pt slightly unsteady while pulling up pants  V C  to try to take time and regain balance  Bed Mobility   Supine to Sit 5  Supervision   Additional items HOB elevated; Bedrails; Increased time required;Verbal cues   Sit to Supine 5  Supervision   Additional items Bedrails; Increased time required;Verbal cues   Additional Comments Pt supine in bed at start of session  RN leaving room at that time and verbalized pt appropriate for tx  Pt on RA  Vitals WNL  Agreeable to sit in recliner for treatment session  Transfers   Sit to Stand 5  Supervision   Additional items Increased time required; Impulsive;Verbal cues   Stand to Sit 5  Supervision   Additional items Increased time required;Verbal cues   Additional Comments No LOB  slight unsteadiness  Advised pt to use RW for support, which she did      Functional Mobility   Functional Mobility   (CGA)   Additional Comments Pt performed functional mobility to recliner with CGA and RW, slight unsteadiness  No LOB  Therapeutic Excerise-Strength   UE Strength Yes   Right Upper Extremity- Strength   R Shoulder Flexion;ABduction; Extension   R Elbow Elbow flexion;Elbow extension   R Weight/Reps/Sets 3# bar/10 reps/2 sets   RUE Strength Comment Pt initially tried 1# bar and reported it was too easy  Pt denied any pain through exercises  Left Upper Extremity-Strength   L Shoulder Flexion;ABduction; Extension   L Elbow Elbow flexion;Elbow extension   L Weights/Reps/Sets 3#bar/10 reps/2 sets   Cognition   Overall Cognitive Status [facility]   Arousal/Participation Alert; Responsive; Cooperative   Attention Within functional limits   Orientation Level Oriented X4   Memory Within functional limits   Following Commands Follows one step commands without difficulty   Comments Pt agreeable to OT treatment session  Activity Tolerance   Activity Tolerance Patient tolerated treatment well   Assessment   Assessment  Pt is a 79 y o  female seen for skilled OT treatment session today focused on ADL retraining, functional transfer training, UE strengthening/ROM due to the following deficits impacting occupational performance: weakness, decreased ROM, decreased strength, decreased balance, decreased tolerance and decreased safety awareness  Pt was supine in bed and agreeable to OT treatment session  Pt agreeable to transfer to recliner  Supine to seated EOB with supervision, use of bedrails and HOB elevated  STS with supervision  No LOB but pt slightly unsteady, advised use of RW and pt agreed  Pt CGA and RW for approximately 6 steps to recliner, no LOB, slight unsteadiness  Pt denied any dizziness or lightheadedness  Pt sit to stand with supervision  Pt completed BUE strengthening exercises, shoulder flexion/extension, abduction/adduction, elbow flexion/extension 3# bar/10 reps/2 sets, while seated in recliner  Pt denied any pain   Pt donned pants while seated with supervision, able to thread BLE without LOB  Pt stood up to pull up pants and tie pants and slightly unsteady  Pt required v c for safety awareness and to wait till she regains her balance before continuing task  Pt performed functional mobility back to bed with CGA and RW  Stand to sit with supervision  Seated EOB to supine with supervision  Pt left supine in bed at end of session, all needs met, call bell within reach, bed alarm on  Pt to benefit from continued skilled OT tx while in the hospital to address deficits as defined above and maximize level of functional independence w ADL's and functional mobility  Occupational Performance areas to address include: grooming, bathing/shower, toilet hygiene, dressing, health maintenance and functional mobility  The patient's raw score on the AM-PAC Daily Activity inpatient short form is 20, standardized score is 42 03, greater than 39 4  Patients at this level are likely to benefit from discharge to home  Pt continues to required skilled OT treatment to increase safety and maximize independence in ADLs and functional mobility  Please refer to the recommendation of the Occupational Therapist for safe discharge planning  Plan   Treatment Interventions ADL retraining;Functional transfer training;UE strengthening/ROM   Goal Expiration Date 07/15/21   OT Treatment Day   (1)   OT Frequency 3-5x/wk   Recommendation   OT Discharge Recommendation Home with home health rehabilitation   OT - OK to Discharge Yes  (once medically cleared)   Additional Comments  Pt left supine in bed at end of session  All needs met  Bed alarm on  Call bell within reach  Vitals WNL     AM-PAC Daily Activity Inpatient   Lower Body Dressing 3   Bathing 3   Toileting 3   Upper Body Dressing 3   Grooming 4   Eating 4   Daily Activity Raw Score 20   Daily Activity Standardized Score (Calc for Raw Score >=11) 42 03   AM-PAC Applied Cognition Inpatient   Following a Speech/Presentation 4   Understanding Ordinary Conversation 4   Taking Medications 4   Remembering Where Things Are Placed or Put Away 4   Remembering List of 4-5 Errands 4   Taking Care of Complicated Tasks 4   Applied Cognition Raw Score 24   Applied Cognition Standardized Score 62 21     Andrés Armas, OT

## 2021-07-02 NOTE — DISCHARGE SUMMARY
[address] [facility] [address] [place]  Discharge- Jose Mendiola 1940, 79 y o  female MRN: [id number]  Unit/Bed#: 400-01 Encounter: [id number]  Primary Care Provider: David Fry   Date and time admitted to hospital: 6/30/2021 12:32 PM    * Epigastric pain  Assessment & Plan  Patient presented to the ED with abdominal pain-was seen on 02/14/2021 for 5 days of epigastric pain but also had a fall and was discharged from the ED  This was followed by an upper GI bleed at [facility] on 04/16/2021  She was discharged and was to be scheduled with an EGD, colonoscopy  She was transfused 2 units of PRBC's  No account of follow-up  Ed course:  Vitals: WNL  Labs: WNL  Imaging: Ct abd/pelvis:  Large hiatal hernia  Micro:  COVID negative  Improved with PRN Zofran, Oxycontin, Protonix, IV fluids, Carafate    Diet advanced as tolerated  Received IV fluid hydration  Will continue   Tylenol, Tramadol PRN for pain control  RUQ US - no biliary dilation, post cholecystectomy  EGD showed mild gastritis, otherwise normal  Biopsies obtained  Plan for close outpatient follow up with PCP  Other chest pain  Assessment & Plan  Suspect anxiety mediated  Sudden onset associated with SOB, generalized shaking, numbness, lightheadedness  Resolved with 1 time dose of IV ativan  CXR, EKG unremarkable  Troponin negative x 2  Moderate protein-calorie malnutrition ([address] )  Assessment & Plan  Malnutrition Findings:   Adult Malnutrition type: Chronic illness  Adult Degree of Malnutrition: Malnutrition of moderate degree (r/t decreased appetite reportedly d/t stress and likely depression as evidenced by <75% energy intake x >1 month and signficant wt  loss of 7 8% x ~2 5 months (136 5# 7/1/21, 148# 4/16/21); treated w/ po diet & supplements )    BMI Findings: Body mass index is 23 31 kg/m²  Will add low dose remeron for appetite stimulation        Large hiatal hernia  Assessment & Plan  Found incidentally on CT abdomen pelvis:1   No acute CT findings in the chest, abdomen, or pelvis within the limits of unenhanced technique  2   Large hiatal hernia    Other specified hypothyroidism  Assessment & Plan  Continue home medication    Type 2 diabetes mellitus without complication, without long-term current use of insulin [facility])  Assessment & Plan  Lab Results   Component Value Date    HGBA1C 5 2 07/01/2021       Recent Labs     07/01/21  1957 07/01/21  2030 07/02/21  0658 07/02/21  1103   POCGLU 107 92 90 103       Blood Sugar Average: Last 72 hrs:  (P) 97 875 at goal  Insulin sliding scale with Accu-Cheks  Hypoglycemic protocol    Dyslipidemia  Assessment & Plan  Lipid panel - total cholesterol 178, triglycerides 179, HDL 61, LDL 81  Continue Lipitor    Essential hypertension  Assessment & Plan  Currently stable  Continue to monitor  Continue home medication      Discharging Physician / Practitioner: Rachel Hill PA-C  PCP: Loc Newton  Admission Date:   Admission Orders (From admission, onward)     Ordered        06/30/21 1644  Inpatient Admission  Once                   Discharge Date: 07/02/21    Medical Problems     Resolved Problems  Date Reviewed: 7/2/2021    None                Consultations During Hospital Stay:  · Gastroenterology    Procedures Performed:   · EGD    Significant Findings / Test Results:   EGD  Result Date: 7/2/2021  Impression: The esophagus appeared normal  Regular Z-line 5 cm herniation of gastric fundus into thoracic cavity (type II hiatal hernia) Mild, patchy erythematous mucosa in the stomach; performed cold forceps biopsy  R/o H pylori  The duodenum appeared normal  RECOMMENDATION: Await pathology results  Mitchel Thibodeaux MD     CT chest abdomen pelvis wo contrast  Result Date: 6/30/2021  Impression: 1  No acute CT findings in the chest, abdomen, or pelvis within the limits of unenhanced technique  2   Large hiatal hernia   Workstation performed: [id number]     XR chest portable  Result Date: 7/2/2021  Impression: No acute cardiopulmonary disease  Workstation performed: [id number]     X-ray chest 1 view portable    Result Date: 6/30/2021  Impression: Questionable area of infiltrate medially in the right upper lobe  Hiatal hernia  The study was marked in [facility] for immediate notification  Workstation performed: [id number]     CT head without contrast  Result Date: 6/30/2021  Impression: No acute intracranial abnormality or significant change from prior  Workstation performed: Lugenia Sers right upper quadrant  Result Date: 7/1/2021  Impression: Postcholecystectomy  No biliary dilation  Workstation performed: [id number]       Incidental Findings:   · none    Test Results Pending at Discharge (will require follow up):   · Cold forceps biopsy of stomach     Outpatient Tests Requested:  · none    Complications:  none    Reason for Admission:     Hospital Course:     Franck Chavez is a 79 y o  female patient who originally presented to the hospital on 6/30/2021 due to epigastric pain  Please refer to admission H&P for further details regarding the patient's initial presentation and management  Please see above list of diagnoses and related plan for additional information  Condition at Discharge: good     Discharge Day Visit / Exam:   Subjective:  Patient is feeling much better following her EGD  No further chest pain, generalized weakness, shaking  Vitals: Blood Pressure: 105/70 (07/02/21 1412)  Pulse: 70 (07/02/21 1412)  Temperature: 97 5 °F (36 4 °C) (07/02/21 1412)  Temp Source: Oral (07/02/21 0654)  Respirations: 16 (07/02/21 1412)  Height: 5' 4" (162 6 cm) (06/30/21 1715)  Weight - Scale: 61 6 kg (135 lb 12 9 oz) (07/02/21 0600)  SpO2: 95 % (07/02/21 1412)  Exam:   Physical Exam  Vitals and nursing note reviewed  Constitutional:       General: She is not in acute distress  Appearance: She is not ill-appearing  HENT:      Head: Normocephalic     Cardiovascular: Rate and Rhythm: Normal rate and regular rhythm  Pulmonary:      Effort: Pulmonary effort is normal       Breath sounds: Normal breath sounds  Abdominal:      General: Abdomen is flat  Bowel sounds are normal  There is no distension  Tenderness: There is no abdominal tenderness  Musculoskeletal:      Right lower leg: No edema  Left lower leg: No edema  Skin:     General: Skin is warm and dry  Neurological:      Mental Status: She is alert and oriented to person, place, and time  Psychiatric:         Mood and Affect: Mood normal            Discharge instructions/Information to patient and family:   See after visit summary for information provided to patient and family  Provisions for Follow-Up Care:  See after visit summary for information related to follow-up care and any pertinent home health orders  Disposition:   Home      Planned Readmission: no     Discharge Statement:  I spent 45 minutes discharging the patient  This time was spent on the day of discharge  I had direct contact with the patient on the day of discharge  Greater than 50% of the total time was spent examining patient, answering all patient questions, arranging and discussing plan of care with patient as well as directly providing post-discharge instructions  Additional time then spent on discharge activities  Discharge Medications:  See after visit summary for reconciled discharge medications provided to patient and family        ** Please Note: This note has been constructed using a voice recognition system **

## 2021-07-02 NOTE — ANESTHESIA PREPROCEDURE EVALUATION
Procedure:  EGD    Relevant Problems   CARDIO   (+) Essential hypertension      ENDO   (+) Other specified hypothyroidism   (+) Type 2 diabetes mellitus without complication, without long-term current use of insulin (HCC)      GI/HEPATIC   (+) Large hiatal hernia      Other   (+) Dyslipidemia   (+) Moderate protein-calorie malnutrition (HCC)      Epigastric pain with large hiatal hernia    TTE 7/1/21  SUMMARY (tehnically difficult study)    LEFT VENTRICLE:  Size was normal   Systolic function was normal  Ejection fraction was estimated in the range of 55 % to 65 %  There were no regional wall motion abnormalities  There was very mild diffuse hypokinesis  Wall thickness was normal   There was no evidence of concentric hypertrophy      MITRAL VALVE:  There was mild regurgitation      AORTIC VALVE:  The valve was probably trileaflet  There was no evidence for stenosis  There was trace regurgitation      TRICUSPID VALVE:  There was mild regurgitation  Physical Exam    Airway    Mallampati score: II  TM Distance: >3 FB       Dental       Cardiovascular      Pulmonary      Other Findings        Anesthesia Plan  ASA Score- 2     Anesthesia Type- IV sedation with anesthesia with ASA Monitors  Additional Monitors:   Airway Plan:           Plan Factors-Exercise tolerance (METS): >4 METS  Chart reviewed  Existing labs reviewed  Patient summary reviewed  Induction- intravenous  Postoperative Plan-     Informed Consent- Anesthetic plan and risks discussed with patient  I personally reviewed this patient with the CRNA  Discussed and agreed on the Anesthesia Plan with the CRNA  France Murillo

## 2021-07-02 NOTE — CONSULTS
Consultation - [facility]  Carmela Gaitan 79 y o  female MRN: [id number]  Unit/Bed#: 400-01 Encounter: [id number]        Consults    ASSESSMENT/ PLAN:    58-year-old female with a past medical history of hypertension, dyslipidemia, diabetes type 2, large hiatal hernia present to the emergency room with epigastric pain    1  Epigastric abdominal pain: admits to longstanding hx of epigastric/chest pain that has been worsening  She describes regurgitation and admits to nausea today with her pain  She denies vomiting, change in bowel habits, melena or hematochezia  She was seen at [address] [place] [address] in April and diagnosed with anemia and planned for outpatient EGD and colonoscopy  Today her Hgb is WNL at 11  6  given her persistent pain, plan for EGD today   -NPO  -continue PPI BID  -reflux diet   -EGD today for further evaluation     Reason for Consult / Principal Problem: epigastric/chest pain    HPI: Arslan Trujillo is an 79 Year old female with past medical history of hypertension, dyslipidemia, diabetes type 2, large hiatal hernia presented here with epigastric abdominal pain  She admits to anoxic history pain over the past 1-2 years but states that that has been recently worsening  It mainly starts in the epigastric region does radiate up into chest   Describes what sounds is any acid reflux or vomiting but does admit to nausea today  She denies any change in her bowel habits, melena or hematochezia  She went to [facility] in April with similar symptoms and was found to be anemic  She was discharged and recommended to have outpatient EGD and colonoscopy which was never done  Fortunately today her hemoglobin is within normal limits at 11 6  REVIEW OF SYSTEMS:     CONSTITUTIONAL: Denies any fever, chills, or rigors  Good appetite, and no recent weight loss  HEENT: No earache or tinnitus  Denies hearing loss or visual disturbances  CARDIOVASCULAR: No chest pain or palpitations     RESPIRATORY: Denies any cough, hemoptysis, shortness of breath or dyspnea on exertion  GASTROINTESTINAL: As noted in the History of Present Illness  GENITOURINARY: No problems with urination  Denies any hematuria or dysuria  NEUROLOGIC: No dizziness or vertigo, denies headaches  MUSCULOSKELETAL: Denies any muscle or joint pain  SKIN: Denies skin rashes or itching  ENDOCRINE: Denies excessive thirst  Denies intolerance to heat or cold  PSYCHOSOCIAL: Denies depression or anxiety  Denies any recent memory loss  Historical Information   Past Medical History:   Diagnosis Date    Arthritis     Diabetes mellitus ([facility] [address] )      History reviewed  No pertinent surgical history  Social History   Social History     Substance and Sexual Activity   Alcohol Use Never     Social History     Substance and Sexual Activity   Drug Use Never     Social History     Tobacco Use   Smoking Status Never Smoker   Smokeless Tobacco Never Used     History reviewed  No pertinent family history      Meds/Allergies     Medications Prior to Admission   Medication    acetaminophen (TYLENOL) 500 mg tablet    atorvastatin (LIPITOR) 20 mg tablet    baclofen 10 mg tablet    ferrous sulfate 325 (65 Fe) mg tablet    insulin aspart (NovoLOG) 100 units/mL injection    levothyroxine 75 mcg tablet    metFORMIN (GLUCOPHAGE) 500 mg tablet    ondansetron (ZOFRAN-ODT) 4 mg disintegrating tablet    pantoprazole (PROTONIX) 40 mg tablet    nitroglycerin (NITROSTAT) 0 4 mg SL tablet     Current Facility-Administered Medications   Medication Dose Route Frequency    acetaminophen (TYLENOL) tablet 650 mg  650 mg Oral Q6H PRN    atorvastatin (LIPITOR) tablet 20 mg  20 mg Oral Daily    calcium carbonate (TUMS) chewable tablet 1,000 mg  1,000 mg Oral Daily PRN    enoxaparin (LOVENOX) subcutaneous injection 40 mg  40 mg Subcutaneous Daily    insulin lispro (HumaLOG) 100 units/mL subcutaneous injection 1-5 Units  1-5 Units Subcutaneous TID AC    insulin lispro (HumaLOG) 100 units/mL subcutaneous injection 1-5 Units  1-5 Units Subcutaneous HS    levothyroxine tablet 75 mcg  75 mcg Oral Daily    LORazepam (ATIVAN) injection 0 5 mg  0 5 mg Intravenous Once    melatonin tablet 3 mg  3 mg Oral HS    morphine injection 1 mg  1 mg Intravenous Q4H PRN    ondansetron (ZOFRAN) injection 4 mg  4 mg Intravenous Q6H PRN    pantoprazole (PROTONIX) injection 40 mg  40 mg Intravenous Q12H [address]    polyethylene glycol (MIRALAX) packet 17 g  17 g Oral Daily PRN    sodium chloride (PF) 0 9 % injection 3 mL  3 mL Intravenous Q1H PRN    traMADol (ULTRAM) tablet 50 mg  50 mg Oral Q6H PRN       Allergies   Allergen Reactions    Dye [Iodinated Diagnostic Agents] Nausea Only    Penicillins Other (See Comments)     "dizzy, nauseous"           Objective     Blood pressure 169/99, pulse 80, temperature 97 9 °F (36 6 °C), temperature source Oral, resp  rate 16, height 5' 4" (1 626 m), weight 61 6 kg (135 lb 12 9 oz), SpO2 100 %  Intake/Output Summary (Last 24 hours) at 7/2/2021 1016  Last data filed at 7/2/2021 3611  Gross per 24 hour   Intake 1120 ml   Output --   Net 1120 ml         PHYSICAL EXAM:      General Appearance:   A&Ox3, cooperative, no distress, appears stated age    HEENT:   Normocephalic, atraumatic      Neck:  Supple, symmetrical, trachea midline   Lungs:   Clear to auscultation bilaterally; no rales, rhonchi or wheezing    Heart[de-identified]   S1 and S2 normal; regular rate and rhythm; no murmur, rub, or gallop     Abdomen:   Soft, epigastric tenderness, non-distended; normal bowel sounds; no masses, no organomegaly    Genitalia:   Deferred    Rectal:   Deferred    Extremities:  No cyanosis, clubbing or edema    Pulses:  2+ and symmetric all extremities    Skin:  Skin color, texture, turgor normal, no rashes or lesions          Lab Results:   Admission on 06/30/2021   Component Date Value    WBC 06/30/2021 5 60     RBC 06/30/2021 3 84     Hemoglobin 06/30/2021 11 2*    Hematocrit 06/30/2021 37 1     MCV 06/30/2021 97     MCH 06/30/2021 29 2     MCHC 06/30/2021 30 2*    RDW 06/30/2021 16 9*    MPV 06/30/2021 9 2     Platelets 04/39/9014 226     nRBC 06/30/2021 0     Neutrophils Relative 06/30/2021 75     Immat GRANS % 06/30/2021 1     Lymphocytes Relative 06/30/2021 16     Monocytes Relative 06/30/2021 6     Eosinophils Relative 06/30/2021 1     Basophils Relative 06/30/2021 1     Neutrophils Absolute 06/30/2021 4 27     Immature Grans Absolute 06/30/2021 0 04     Lymphocytes Absolute 06/30/2021 0 87     Monocytes Absolute 06/30/2021 0 35     Eosinophils Absolute 06/30/2021 0 04     Basophils Absolute 06/30/2021 0 03     Troponin I 06/30/2021 <0 02     NT-proBNP 06/30/2021 90     Sodium 06/30/2021 141     Potassium 06/30/2021 4 1     Chloride 06/30/2021 106     CO2 06/30/2021 25     ANION GAP 06/30/2021 10     BUN 06/30/2021 11     Creatinine 06/30/2021 1 14     Glucose 06/30/2021 107     Calcium 06/30/2021 9 0     AST 06/30/2021 16     ALT 06/30/2021 18     Alkaline Phosphatase 06/30/2021 48     Total Protein 06/30/2021 6 8     Albumin 06/30/2021 3 7     Total Bilirubin 06/30/2021 0 30     eGFR 06/30/2021 45     Lipase 06/30/2021 101     Magnesium 06/30/2021 1 8     Protime 06/30/2021 13 9     INR 06/30/2021 1 09     PTT 06/30/2021 32     LACTIC ACID 06/30/2021 1 9     ABO Grouping 06/30/2021 O     Rh Factor 06/30/2021 Positive     Antibody Screen 06/30/2021 Negative     Specimen Expiration Date 06/30/2021 [id number]     Color, UA 06/30/2021 Yellow     Clarity, UA 06/30/2021 Clear     Specific Gravity, UA 06/30/2021 1 010     pH, UA 06/30/2021 5 5     Leukocytes, UA 06/30/2021 Negative     Nitrite, UA 06/30/2021 Negative     Protein, UA 06/30/2021 Negative     Glucose, UA 06/30/2021 Negative     Ketones, UA 06/30/2021 Negative     Urobilinogen, UA 06/30/2021 0 2     Bilirubin, UA 06/30/2021 Negative     Blood, UA 06/30/2021 Negative     ABO Grouping 06/30/2021 O     Rh Factor 06/30/2021 Positive     SARS-CoV-2 06/30/2021 Negative     POC Glucose 06/30/2021 89     TSH 3RD GENERATON 07/01/2021 5 464*    Cholesterol 07/01/2021 178     Triglycerides 07/01/2021 179*    HDL, Direct 07/01/2021 61     LDL Calculated 07/01/2021 81     Hemoglobin A1C 07/01/2021 5 2     EAG 07/01/2021 103     Sodium 07/01/2021 143     Potassium 07/01/2021 4 5     Chloride 07/01/2021 108     CO2 07/01/2021 29     ANION GAP 07/01/2021 6     BUN 07/01/2021 6     Creatinine 07/01/2021 1 00     Glucose 07/01/2021 88     Calcium 07/01/2021 8 6     Corrected Calcium 07/01/2021 9 3     AST 07/01/2021 15     ALT 07/01/2021 14     Alkaline Phosphatase 07/01/2021 42*    Total Protein 07/01/2021 6 0*    Albumin 07/01/2021 3 1*    Total Bilirubin 07/01/2021 0 38     eGFR 07/01/2021 53     Magnesium 07/01/2021 1 7     Phosphorus 07/01/2021 3 4     WBC 07/01/2021 3 35*    RBC 07/01/2021 3 60*    Hemoglobin 07/01/2021 10 6*    Hematocrit 07/01/2021 35 4     MCV 07/01/2021 98     MCH 07/01/2021 29 4     MCHC 07/01/2021 29 9*    RDW 07/01/2021 16 6*    MPV 07/01/2021 9 0     Platelets 04/15/1320 210     nRBC 07/01/2021 0     Neutrophils Relative 07/01/2021 52     Immat GRANS % 07/01/2021 0     Lymphocytes Relative 07/01/2021 35     Monocytes Relative 07/01/2021 9     Eosinophils Relative 07/01/2021 3     Basophils Relative 07/01/2021 1     Neutrophils Absolute 07/01/2021 1 76*    Immature Grans Absolute 07/01/2021 0 01     Lymphocytes Absolute 07/01/2021 1 16     Monocytes Absolute 07/01/2021 0 29     Eosinophils Absolute 07/01/2021 0 10     Basophils Absolute 07/01/2021 0 03     LACTIC ACID 07/01/2021 1 0     Total CK 07/01/2021 75     Procalcitonin 07/01/2021 <0 05     Troponin I 07/01/2021 <0 02     POC Glucose 07/01/2021 92     Ventricular Rate 06/30/2021 91     Atrial Rate 06/30/2021 91     IA Interval 06/30/2021 154     QRSD Interval 06/30/2021 84     QT Interval 06/30/2021 362     QTC Interval 06/30/2021 445     P Axis 06/30/2021 52     QRS Axis 06/30/2021 55     T Wave Axis 06/30/2021 40     POC Glucose 07/01/2021 97     POC Glucose 07/01/2021 113     Free T4 07/01/2021 0 80     POC Glucose 07/01/2021 107     POC Glucose 07/01/2021 92     WBC 07/02/2021 3 73*    RBC 07/02/2021 3 93     Hemoglobin 07/02/2021 11 6     Hematocrit 07/02/2021 38 5     MCV 07/02/2021 98     MCH 07/02/2021 29 5     MCHC 07/02/2021 30 1*    RDW 07/02/2021 16 1*    MPV 07/02/2021 9 0     Platelets 87/95/4633 219     nRBC 07/02/2021 0     Neutrophils Relative 07/02/2021 61     Immat GRANS % 07/02/2021 1     Lymphocytes Relative 07/02/2021 28     Monocytes Relative 07/02/2021 7     Eosinophils Relative 07/02/2021 2     Basophils Relative 07/02/2021 1     Neutrophils Absolute 07/02/2021 2 29     Immature Grans Absolute 07/02/2021 0 02     Lymphocytes Absolute 07/02/2021 1 03     Monocytes Absolute 07/02/2021 0 27     Eosinophils Absolute 07/02/2021 0 09     Basophils Absolute 07/02/2021 0 03     Sodium 07/02/2021 143     Potassium 07/02/2021 4 4     Chloride 07/02/2021 107     CO2 07/02/2021 30     ANION GAP 07/02/2021 6     BUN 07/02/2021 4*    Creatinine 07/02/2021 0 91     Glucose 07/02/2021 97     Calcium 07/02/2021 8 8     eGFR 07/02/2021 59     POC Glucose 07/02/2021 90        Imaging Studies: I have personally reviewed pertinent imaging studies  CT chest abdomen pelvis wo contrast    Result Date: 6/30/2021  Impression: 1  No acute CT findings in the chest, abdomen, or pelvis within the limits of unenhanced technique  2   Large hiatal hernia  X-ray chest 1 view portable    Result Date: 6/30/2021  Impression: Questionable area of infiltrate medially in the right upper lobe  Hiatal hernia  The study was marked in [facility] for immediate notification       CT head without contrast    Result Date: 6/30/2021  Impression: No acute intracranial abnormality or significant change from prior  US right upper quadrant    Result Date: 7/1/2021  Impression: Postcholecystectomy  No biliary dilation  This patient was seen and examined by DR Lopez  All wagner medical decisions were made by Dr Priscille Seip  Thank you for allowing us to participate in the care of this patient  We will follow up closely with you

## 2021-07-02 NOTE — ASSESSMENT & PLAN NOTE
Lab Results   Component Value Date    HGBA1C 5 2 07/01/2021       Recent Labs     07/01/21  1957 07/01/21  2030 07/02/21  0658 07/02/21  1103   POCGLU 107 92 90 103       Blood Sugar Average: Last 72 hrs:  (P) 97 875 at goal  Insulin sliding scale with Accu-Cheks  Hypoglycemic protocol

## 2021-07-02 NOTE — PLAN OF CARE
Problem: PHYSICAL THERAPY ADULT  Goal: Performs mobility at highest level of function for planned discharge setting  See evaluation for individualized goals  Description:   Outcome: Progressing  Note: Prognosis: Good  Problem List: Decreased strength, Decreased endurance, Impaired balance, Decreased mobility, Decreased safety awareness  Assessment: Pt  seen for PT treatment session this date with interventions consisting of  transfers and  gait training w/ emphasis on improving pt's ability to ambulate  Pt  Currently performing  tx and ambulation at (SUP-CGA ) x 1 level of function  The patient's AM-PAC Basic Mobility Inpatient Short Form Raw Score is 20, Standardized Score is 43 99  A standardized score greater than 42 9 suggests the patient may benefit from discharge to home  Please also refer to physical therapy recommendation for safe DC planning  In comparison to previous session, Pt  With improvements in activity tolerance  Good tolerance to increased distance  Pt is in need of continued activity in PT to improve strength balance endurance mobility transfers and ambulation with return to maximize LOF  From PT/mobility standpoint, recommendation at time of d/c would be home with OP PT in order to promote return to PLOF and independence  PT Discharge Recommendation: Home with outpatient rehabilitation          See flowsheet documentation for full assessment

## 2021-07-02 NOTE — ASSESSMENT & PLAN NOTE
Patient presented to the ED with abdominal pain-was seen on 02/14/2021 for 5 days of epigastric pain but also had a fall and was discharged from the ED  This was followed by an upper GI bleed at [facility] on 04/16/2021  She was discharged and was to be scheduled with an EGD, colonoscopy  She was transfused 2 units of PRBC's  No account of follow-up  Ed course:  Vitals: WNL  Labs: WNL  Imaging: Ct abd/pelvis:  Large hiatal hernia  Micro:  COVID negative  Improved with PRN Zofran, Oxycontin, Protonix, IV fluids, Carafate    Diet advanced as tolerated  Received IV fluid hydration  Will continue   Tylenol, Tramadol PRN for pain control  RUQ US - no biliary dilation, post cholecystectomy  EGD showed mild gastritis, otherwise normal  Biopsies obtained  Plan for close outpatient follow up with PCP

## 2021-07-02 NOTE — DISCHARGE INSTRUCTIONS
Recommend new prescription for mirtazapine (Remeron) - this will help withh appetite stimulation as recommended by dietician

## 2021-07-02 NOTE — ASSESSMENT & PLAN NOTE
Malnutrition Findings:   Adult Malnutrition type: Chronic illness  Adult Degree of Malnutrition: Malnutrition of moderate degree (r/t decreased appetite reportedly d/t stress and likely depression as evidenced by <75% energy intake x >1 month and signficant wt  loss of 7 8% x ~2 5 months (136 5# 7/1/21, 148# 4/16/21); treated w/ po diet & supplements )    BMI Findings: Body mass index is 23 31 kg/m²  Will add low dose remeron for appetite stimulation

## 2021-07-02 NOTE — PHYSICAL THERAPY NOTE
PHYSICAL THERAPY NOTE          Patient Name: Jazmyn Lacy  UIOQO'S Date: 7/2/2021 07/02/21 8083   Note Type   Note Type Treatment   Restrictions/Precautions   Weight Bearing Precautions Per Order No   Other Precautions Bed Alarm; Chair Alarm; Fall Risk   Cognition   Overall Cognitive Status WFL   Following Commands Follows one step commands without difficulty   Subjective   Subjective Agreeable to therapy  Transfers   Sit to Stand 5  Supervision   Additional items Assist x 1; Armrests; Verbal cues   Stand to Sit 5  Supervision   Additional items Assist x 1; Armrests; Verbal cues   Toilet transfer 5  Supervision   Additional items Assist x 1; Increased time required;Standard toilet   Additional Comments Able to manage clothing and hygiene   Ambulation/Elevation   Gait pattern Forward Flexion;Narrow PAWAN; Improper Weight shift; Short stride   Gait Assistance 5  Supervision  (CGA)   Assistive Device Rolling walker   Distance 350'   Balance   Static Sitting Fair +   Dynamic Sitting Fair +   Static Standing Fair   Dynamic Standing Fair   Ambulatory Fair  (RW)   Endurance Deficit   Endurance Deficit Yes   Activity Tolerance   Activity Tolerance Patient tolerated treatment well   Assessment   Prognosis Good   Problem List Decreased strength;Decreased endurance; Impaired balance;Decreased mobility; Decreased safety awareness   Assessment Pt  seen for PT treatment session this date with interventions consisting of  transfers and  gait training w/ emphasis on improving pt's ability to ambulate  Pt  Currently performing  tx and ambulation at (SUP-CGA ) x 1 level of function  The patient's AM-PAC Basic Mobility Inpatient Short Form Raw Score is 20, Standardized Score is 43 99  A standardized score greater than 42 9 suggests the patient may benefit from discharge to home  Please also refer to physical therapy recommendation for safe DC planning    In comparison to previous session, Pt  With improvements in activity tolerance  Good tolerance to increased distance  Pt is in need of continued activity in PT to improve strength balance endurance mobility transfers and ambulation with return to maximize LOF  From PT/mobility standpoint, recommendation at time of d/c would be home with OP PT in order to promote return to PLOF and independence  Goals   LTG Expiration Date 07/15/21   PT Treatment Day 1   Plan   Treatment/Interventions Functional transfer training;LE strengthening/ROM; Therapeutic exercise; Endurance training;Bed mobility;Gait training   Progress Progressing toward goals   AM-PAC Basic Mobility Inpatient   Turning in Bed Without Bedrails 4   Lying on Back to Sitting on Edge of Flat Bed 3   Moving Bed to Chair 3   Standing Up From Chair 4   Walk in Room 3   Climb 3-5 Stairs 3   Basic Mobility Inpatient Raw Score 20   Basic Mobility Standardized Score 43 99   Pt  OOB in chair  with call bell within reach, all lines intact and alarm on at end of PT session  Discussed with  PT today's treatment and patient's current level of function for care coordination

## 2021-07-02 NOTE — PLAN OF CARE
Problem: OCCUPATIONAL THERAPY ADULT  Goal: Performs self-care activities at highest level of function for planned discharge setting  See evaluation for individualized goals  Description: Treatment Interventions: ADL retraining, Functional transfer training, UE strengthening/ROM, Endurance training, Patient/family training, Equipment evaluation/education, Compensatory technique education, Energy conservation, Activityengagement          See flowsheet documentation for full assessment, interventions and recommendations  Outcome: Progressing  Note: Limitation: Decreased ADL status, Decreased UE strength, Decreased Safe judgement during ADL, Decreased endurance, Decreased self-care trans, Decreased high-level ADLs     Assessment:  Pt is a 79 y o  female seen for skilled OT treatment session today focused on ADL retraining, functional transfer training, UE strengthening/ROM due to the following deficits impacting occupational performance: weakness, decreased ROM, decreased strength, decreased balance, decreased tolerance and decreased safety awareness  Pt was supine in bed and agreeable to OT treatment session  Pt agreeable to transfer to recliner  Supine to seated EOB with supervision, use of bedrails and HOB elevated  STS with supervision  No LOB but pt slightly unsteady, advised use of RW and pt agreed  Pt CGA and RW for approximately 6 steps to recliner, no LOB, slight unsteadiness  Pt denied any dizziness or lightheadedness  Pt sit to stand with supervision  Pt completed BUE strengthening exercises, shoulder flexion/extension, abduction/adduction, elbow flexion/extension 3# bar/10 reps/2 sets, while seated in recliner  Pt denied any pain  Pt donned pants while seated with supervision, able to thread BLE without LOB  Pt stood up to pull up pants and tie pants and slightly unsteady  Pt required v c for safety awareness and to wait till she regains her balance before continuing task   Pt performed functional mobility back to bed with CGA and RW  Stand to sit with supervision  Seated EOB to supine with supervision  Pt left supine in bed at end of session, all needs met, call bell within reach, bed alarm on  Pt to benefit from continued skilled OT tx while in the hospital to address deficits as defined above and maximize level of functional independence w ADL's and functional mobility  Occupational Performance areas to address include: grooming, bathing/shower, toilet hygiene, dressing, health maintenance and functional mobility  The patient's raw score on the AM-PAC Daily Activity inpatient short form is 20, standardized score is 42 03, greater than 39 4  Patients at this level are likely to benefit from discharge to home  Pt continues to required skilled OT treatment to increase safety and maximize independence in ADLs and functional mobility  Please refer to the recommendation of the Occupational Therapist for safe discharge planning          OT Discharge Recommendation: Home with home health rehabilitation  OT - OK to Discharge: Yes (once medically cleared)     Kathrine Araujo, OT

## 2021-07-02 NOTE — OCCUPATIONAL THERAPY NOTE
Occupational Therapy Evaluation     Patient Name: Jaret Hager  [id number] Date: 7/2/2021  Problem List  Principal Problem:    Epigastric pain  Active Problems:    Essential hypertension    Dyslipidemia    Type 2 diabetes mellitus without complication, without long-term current use of insulin ([facility] )    Other specified hypothyroidism    Large hiatal hernia    Moderate protein-calorie malnutrition ([facility] )    Past Medical History  Past Medical History:   Diagnosis Date    Arthritis     Diabetes mellitus ([facility] )      Past Surgical History  History reviewed  No pertinent surgical history  07/01/21 1116   OT Last Visit   OT Visit Date 07/01/21   Note Type   Note type Evaluation   Restrictions/Precautions   Weight Bearing Precautions Per Order No   Other Precautions Chair Alarm; Bed Alarm;Multiple lines; Fall Risk   Pain Assessment   Pain Assessment Tool 0-10   Pain Score No Pain   Home Living   Type of Home House   Home Layout Two level; Laundry in basement;Bed/bath upstairs  (0 ACE)   Bathroom Shower/Tub Tub/shower unit   Bathroom Toilet Standard   Bathroom Equipment Grab bars in shower;Grab bars around toilet;Commode   Bathroom Accessibility Accessible   Home Equipment Walker;Cane   Additional Comments pt reports no use of AD at baseline    Prior Function   Level of [place] Independent with ADLs and functional mobility   Lives With Son  (Son present most of the time - per patient)   Receives Help From Family  (son/daughter)   ADL Assistance Independent   IADLs Independent   Falls in the last 6 months 1 to 4   Vocational Retired   Comments pt states that son lives at home 'most of the time' and is able to help if needed    Psychosocial   Psychosocial (WDL) WDL   Subjective   Subjective "I do everything even cut down the trees"   ADL   Where Assessed Edge of bed   LB Dressing Assistance 4  Minimal Assistance   LB Dressing Deficit Steadying;Verbal cueing;Supervision/safety; Increased time to complete   Additional Comments pt demonstrated LB dressing while seated at EOB; pt noted with unseadiness upon standing    Bed Mobility   Supine to Sit 5  Supervision   Additional items Bedrails; Increased time required   Additional Comments pt on RA; SPO2 WNL; pt seated in chair at end of session with all needs within reach    Transfers   Sit to Stand 4  Minimal assistance   Additional items Assist x 1; Increased time required;Verbal cues   Stand to Sit 4  Minimal assistance   Additional items Assist x 1; Armrests; Increased time required;Verbal cues; Impulsive   Additional Comments initally no AD; pt noted with unseadiness; educated on the use of RW; RW used for remainder of session; upon sitting pt impulsive and insisted on pushing RW to side prior to sitting    Functional Mobility   Functional Mobility 4  Minimal assistance   Additional Comments pt demonstrated functional mobility initally c no AD ~5ft c mod instability requiring HHA; pt further demonstrated FM ~250ft c RW; min (A)x1; no instability or LOB c RW; pt educated on the benefits of RW use; Additional items Rolling walker   Balance   Static Sitting Good   Dynamic Sitting Fair +   Static Standing Fair   Dynamic Standing Fair -   Ambulatory Fair -   Activity Tolerance   Activity Tolerance Patient tolerated treatment well   RUE Assessment   RUE Assessment WFL  (4-/5 strength grossly throughout UB)   LUE Assessment   LUE Assessment WFL  (4-/5 strength grossly throughout UB)   Hand Function   Gross Motor Coordination Functional   Fine Motor Coordination Functional   Sensation   Light Touch No apparent deficits   Sharp/Dull No apparent deficits   Cognition   Overall Cognitive Status WFL   Arousal/Participation Alert   Attention Attends with cues to redirect   Orientation Level Oriented X4   Memory Within functional limits   Following Commands Follows one step commands without difficulty   Assessment   Limitation Decreased ADL status; Decreased UE strength;Decreased Safe judgement during ADL;Decreased endurance;Decreased self-care trans;Decreased high-level ADLs   Assessment  Pt is a 79 y o  female seen for OT evaluation s/p admit to [facility] on 6/30/2021 w/ Epigastric pain  Comorbidities affecting pt's functional performance at time of assessment include: Arthritis, DM, HTN, HLD, and Hypothpothyroidism   Personal factors affecting pt at time of IE include:difficulty performing ADLS, difficulty performing IADLS , limited insight into deficits, decreased initiation and engagement  and health management   Prior to admission, pt was (I) c ADLs and  (A) c IADLs  Upon evaluation: Pt requires (S) - min (A) for ADLs including bed mobility, transfers, LB dressing and functional mobility requiring the use of RW 2* the following deficits impacting occupational performance: weakness, decreased strength, decreased balance, decreased tolerance, impaired initiation, impaired sequencing, impaired problem solving and decreased safety awareness  Pt to benefit from continued skilled OT tx while in the hospital to address deficits as defined above and maximize level of functional independence w ADL's and functional mobility  Occupational Performance areas to address include: grooming, bathing/shower, toilet hygiene, dressing, health maintenance, functional mobility, community mobility and clothing management  The patient's raw score on the AM-PAC Daily Activity inpatient short form is 21, standardized score is 44 27, greater than 39 4  Patients at this level are likely to benefit from discharge to home  Please refer to the recommendation of the Occupational Therapist for safe discharge planning  Co treatment with PT secondary to complex medical condition of pt, possible A of 2 required to achieve and maintain transitional movements, requiring the need of skilled therapeutic intervention of 2 therapists to achieve delivery of services      Goals   Patient Goals to go home   Short Term Goal  pt will perform UB strengthening exercises   Long Term Goal #1 pt will demonstrate UB/LB bathing and grooming at (I) level   Long Term Goal #2 pt will demonstrate toileting transfers and hygiene at (I) level    Long Term Goal pt will demonstrate functional mobility c RW at modified (I) level c no instability or LOB    Plan   Treatment Interventions ADL retraining;Functional transfer training;UE strengthening/ROM; Endurance training;Patient/family training;Equipment evaluation/education; Compensatory technique education; Energy conservation; Activityengagement   Goal Expiration Date 07/15/21   OT Frequency 3-5x/wk   Recommendation   OT Discharge Recommendation Home with outpatient rehabilitation   AM-PAC Daily Activity Inpatient   Lower Body Dressing 3   Bathing 3   Toileting 3   Upper Body Dressing 4   Grooming 4   Eating 4   Daily Activity Raw Score 21   Daily Activity Standardized Score (Calc for Raw Score >=11) 44 27   AM-PAC Applied Cognition Inpatient   Following a Speech/Presentation 4   Understanding Ordinary Conversation 4   Taking Medications 4   Remembering Where Things Are Placed or Put Away 4   Remembering List of 4-5 Errands 3   Taking Care of Complicated Tasks 2   Applied Cognition Raw Score 21   Applied Cognition Standardized Score 44 3     Pt will benefit from continued OT services in order to maximize (I) c ADL performance, FM c RW, and improve overall endurance/strength required to complete functional tasks in preparation for d/c  Pt left seated in chair at end of session; all needs within reach; all lines intact

## 2021-07-02 NOTE — PLAN OF CARE
Problem: OCCUPATIONAL THERAPY ADULT  Goal: Performs self-care activities at highest level of function for planned discharge setting  See evaluation for individualized goals  Description: Treatment Interventions: ADL retraining, Functional transfer training, UE strengthening/ROM, Endurance training, Patient/family training, Equipment evaluation/education, Compensatory technique education, Energy conservation, Activityengagement          See flowsheet documentation for full assessment, interventions and recommendations  Note: Limitation: Decreased ADL status, Decreased UE strength, Decreased Safe judgement during ADL, Decreased endurance, Decreased self-care trans, Decreased high-level ADLs     Assessment:  Pt is a 79 y o  female seen for OT evaluation s/p admit to [facility] on 6/30/2021 w/ Epigastric pain  Comorbidities affecting pt's functional performance at time of assessment include: Arthritis, DM, HTN, HLD, and Hypothpothyroidism   Personal factors affecting pt at time of IE include:difficulty performing ADLS, difficulty performing IADLS , limited insight into deficits, decreased initiation and engagement  and health management   Prior to admission, pt was (I) c ADLs and  (A) c IADLs  Upon evaluation: Pt requires (S) - min (A) for ADLs including bed mobility, transfers, LB dressing and functional mobility requiring the use of RW 2* the following deficits impacting occupational performance: weakness, decreased strength, decreased balance, decreased tolerance, impaired initiation, impaired sequencing, impaired problem solving and decreased safety awareness  Pt to benefit from continued skilled OT tx while in the hospital to address deficits as defined above and maximize level of functional independence w ADL's and functional mobility   Occupational Performance areas to address include: grooming, bathing/shower, toilet hygiene, dressing, health maintenance, functional mobility, community mobility and clothing management  The patient's raw score on the AM-PAC Daily Activity inpatient short form is 21, standardized score is 44 27, greater than 39 4  Patients at this level are likely to benefit from discharge to home  Please refer to the recommendation of the Occupational Therapist for safe discharge planning  Co treatment with PT secondary to complex medical condition of pt, possible A of 2 required to achieve and maintain transitional movements, requiring the need of skilled therapeutic intervention of 2 therapists to achieve delivery of services        OT Discharge Recommendation: Home with outpatient rehabilitation

## 2021-07-16 ENCOUNTER — TELEPHONE (OUTPATIENT)
Dept: GASTROENTEROLOGY | Facility: CLINIC | Age: 81
End: 2021-07-16

## 2021-07-16 NOTE — TELEPHONE ENCOUNTER
Attempted to contact patient to arrange appointment, voicemail not set up   ----- Message from Jyoti Leo MD sent at 7/16/2021 10:22 AM EDT -----  Hi,    Can we set her up for a office visit in 6-8 weeks? Can be with anyone       Thanks

## 2022-03-15 ENCOUNTER — APPOINTMENT (EMERGENCY)
Dept: RADIOLOGY | Facility: HOSPITAL | Age: 82
End: 2022-03-15
Payer: COMMERCIAL

## 2022-03-15 ENCOUNTER — HOSPITAL ENCOUNTER (EMERGENCY)
Facility: HOSPITAL | Age: 82
Discharge: HOME | End: 2022-03-15
Attending: EMERGENCY MEDICINE | Admitting: EMERGENCY MEDICINE
Payer: COMMERCIAL

## 2022-03-15 ENCOUNTER — APPOINTMENT (EMERGENCY)
Dept: CT IMAGING | Facility: HOSPITAL | Age: 82
End: 2022-03-15
Payer: COMMERCIAL

## 2022-03-15 VITALS
BODY MASS INDEX: 25.39 KG/M2 | OXYGEN SATURATION: 94 % | DIASTOLIC BLOOD PRESSURE: 73 MMHG | WEIGHT: 147.93 LBS | SYSTOLIC BLOOD PRESSURE: 120 MMHG | HEART RATE: 85 BPM | TEMPERATURE: 97.7 F | RESPIRATION RATE: 18 BRPM

## 2022-03-15 DIAGNOSIS — M54.2 NECK PAIN, MUSCULOSKELETAL: Primary | ICD-10-CM

## 2022-03-15 DIAGNOSIS — M47.812 DJD (DEGENERATIVE JOINT DISEASE) OF CERVICAL SPINE: ICD-10-CM

## 2022-03-15 PROBLEM — E11.9 DIABETES MELLITUS (HCC): Status: ACTIVE | Noted: 2021-04-16

## 2022-03-15 PROBLEM — G56.01 CARPAL TUNNEL SYNDROME OF RIGHT WRIST: Status: ACTIVE | Noted: 2020-01-22

## 2022-03-15 PROBLEM — G56.20 ULNAR NERVE ABNORMALITY: Status: ACTIVE | Noted: 2020-01-22

## 2022-03-15 PROBLEM — K21.9 GERD (GASTROESOPHAGEAL REFLUX DISEASE): Status: ACTIVE | Noted: 2021-04-16

## 2022-03-15 PROBLEM — D64.9 SYMPTOMATIC ANEMIA: Status: ACTIVE | Noted: 2022-03-15

## 2022-03-15 PROBLEM — E78.5 HYPERLIPIDEMIA: Status: ACTIVE | Noted: 2021-04-16

## 2022-03-15 PROBLEM — G56.02 CARPAL TUNNEL SYNDROME OF LEFT WRIST: Status: ACTIVE | Noted: 2020-01-22

## 2022-03-15 PROBLEM — E03.9 ACQUIRED HYPOTHYROIDISM: Status: ACTIVE | Noted: 2021-06-30

## 2022-03-15 LAB
2HR DELTA HS TROPONIN: 0 NG/L
ALBUMIN SERPL BCP-MCNC: 4 G/DL (ref 3.5–5)
ALP SERPL-CCNC: 64 U/L (ref 46–116)
ALT SERPL W P-5'-P-CCNC: 13 U/L (ref 12–78)
ANION GAP SERPL CALCULATED.3IONS-SCNC: 11 MMOL/L (ref 4–13)
APTT PPP: 32 SECONDS (ref 23–37)
AST SERPL W P-5'-P-CCNC: 18 U/L (ref 5–45)
BASOPHILS # BLD AUTO: 0.03 THOUSANDS/ΜL (ref 0–0.1)
BASOPHILS NFR BLD AUTO: 0 % (ref 0–1)
BILIRUB SERPL-MCNC: 0.44 MG/DL (ref 0.2–1)
BUN SERPL-MCNC: 11 MG/DL (ref 5–25)
CALCIUM SERPL-MCNC: 9.1 MG/DL (ref 8.3–10.1)
CARDIAC TROPONIN I PNL SERPL HS: 7 NG/L
CARDIAC TROPONIN I PNL SERPL HS: 7 NG/L
CHLORIDE SERPL-SCNC: 103 MMOL/L (ref 100–108)
CO2 SERPL-SCNC: 25 MMOL/L (ref 21–32)
CREAT SERPL-MCNC: 1.23 MG/DL (ref 0.6–1.3)
EOSINOPHIL # BLD AUTO: 0.07 THOUSAND/ΜL (ref 0–0.61)
EOSINOPHIL NFR BLD AUTO: 1 % (ref 0–6)
ERYTHROCYTE [DISTWIDTH] IN BLOOD BY AUTOMATED COUNT: 13.2 % (ref 11.6–15.1)
GFR SERPL CREATININE-BSD FRML MDRD: 40 ML/MIN/1.73SQ M
GLUCOSE SERPL-MCNC: 109 MG/DL (ref 65–140)
HCT VFR BLD AUTO: 35.8 % (ref 34.8–46.1)
HGB BLD-MCNC: 11.2 G/DL (ref 11.5–15.4)
IMM GRANULOCYTES # BLD AUTO: 0.01 THOUSAND/UL (ref 0–0.2)
IMM GRANULOCYTES NFR BLD AUTO: 0 % (ref 0–2)
INR PPP: 1.03 (ref 0.84–1.19)
LIPASE SERPL-CCNC: 133 U/L (ref 73–393)
LYMPHOCYTES # BLD AUTO: 1.42 THOUSANDS/ΜL (ref 0.6–4.47)
LYMPHOCYTES NFR BLD AUTO: 20 % (ref 14–44)
MAGNESIUM SERPL-MCNC: 2.1 MG/DL (ref 1.6–2.6)
MCH RBC QN AUTO: 28.3 PG (ref 26.8–34.3)
MCHC RBC AUTO-ENTMCNC: 31.3 G/DL (ref 31.4–37.4)
MCV RBC AUTO: 90 FL (ref 82–98)
MONOCYTES # BLD AUTO: 0.48 THOUSAND/ΜL (ref 0.17–1.22)
MONOCYTES NFR BLD AUTO: 7 % (ref 4–12)
NEUTROPHILS # BLD AUTO: 4.94 THOUSANDS/ΜL (ref 1.85–7.62)
NEUTS SEG NFR BLD AUTO: 72 % (ref 43–75)
NRBC BLD AUTO-RTO: 0 /100 WBCS
PLATELET # BLD AUTO: 352 THOUSANDS/UL (ref 149–390)
PMV BLD AUTO: 9.4 FL (ref 8.9–12.7)
POTASSIUM SERPL-SCNC: 3.8 MMOL/L (ref 3.5–5.3)
PROT SERPL-MCNC: 7.7 G/DL (ref 6.4–8.2)
PROTHROMBIN TIME: 13 SECONDS (ref 11.6–14.5)
RBC # BLD AUTO: 3.96 MILLION/UL (ref 3.81–5.12)
SODIUM SERPL-SCNC: 139 MMOL/L (ref 136–145)
WBC # BLD AUTO: 6.95 THOUSAND/UL (ref 4.31–10.16)

## 2022-03-15 PROCEDURE — 70450 CT HEAD/BRAIN W/O DYE: CPT

## 2022-03-15 PROCEDURE — 99285 EMERGENCY DEPT VISIT HI MDM: CPT | Performed by: PHYSICIAN ASSISTANT

## 2022-03-15 PROCEDURE — G1004 CDSM NDSC: HCPCS

## 2022-03-15 PROCEDURE — 85610 PROTHROMBIN TIME: CPT | Performed by: PHYSICIAN ASSISTANT

## 2022-03-15 PROCEDURE — 83735 ASSAY OF MAGNESIUM: CPT | Performed by: PHYSICIAN ASSISTANT

## 2022-03-15 PROCEDURE — 71045 X-RAY EXAM CHEST 1 VIEW: CPT

## 2022-03-15 PROCEDURE — 99285 EMERGENCY DEPT VISIT HI MDM: CPT

## 2022-03-15 PROCEDURE — 96374 THER/PROPH/DIAG INJ IV PUSH: CPT

## 2022-03-15 PROCEDURE — 72125 CT NECK SPINE W/O DYE: CPT

## 2022-03-15 PROCEDURE — 80053 COMPREHEN METABOLIC PANEL: CPT | Performed by: PHYSICIAN ASSISTANT

## 2022-03-15 PROCEDURE — 83690 ASSAY OF LIPASE: CPT | Performed by: PHYSICIAN ASSISTANT

## 2022-03-15 PROCEDURE — 96375 TX/PRO/DX INJ NEW DRUG ADDON: CPT

## 2022-03-15 PROCEDURE — 36415 COLL VENOUS BLD VENIPUNCTURE: CPT | Performed by: PHYSICIAN ASSISTANT

## 2022-03-15 PROCEDURE — 85730 THROMBOPLASTIN TIME PARTIAL: CPT | Performed by: PHYSICIAN ASSISTANT

## 2022-03-15 PROCEDURE — 85025 COMPLETE CBC W/AUTO DIFF WBC: CPT | Performed by: PHYSICIAN ASSISTANT

## 2022-03-15 PROCEDURE — 84484 ASSAY OF TROPONIN QUANT: CPT | Performed by: PHYSICIAN ASSISTANT

## 2022-03-15 RX ORDER — METHOCARBAMOL 500 MG/1
500 TABLET, FILM COATED ORAL 2 TIMES DAILY PRN
Qty: 20 TABLET | Refills: 0 | Status: SHIPPED | OUTPATIENT
Start: 2022-03-15

## 2022-03-15 RX ORDER — HYDROMORPHONE HCL/PF 1 MG/ML
0.2 SYRINGE (ML) INJECTION ONCE
Status: COMPLETED | OUTPATIENT
Start: 2022-03-15 | End: 2022-03-15

## 2022-03-15 RX ORDER — PREDNISONE 10 MG/1
TABLET ORAL
Qty: 10 TABLET | Refills: 0 | Status: SHIPPED | OUTPATIENT
Start: 2022-03-16 | End: 2022-03-24

## 2022-03-15 RX ORDER — PREDNISONE 20 MG/1
40 TABLET ORAL ONCE
Status: COMPLETED | OUTPATIENT
Start: 2022-03-15 | End: 2022-03-15

## 2022-03-15 RX ORDER — MORPHINE SULFATE 4 MG/ML
4 INJECTION, SOLUTION INTRAMUSCULAR; INTRAVENOUS ONCE
Status: COMPLETED | OUTPATIENT
Start: 2022-03-15 | End: 2022-03-15

## 2022-03-15 RX ORDER — ONDANSETRON 2 MG/ML
4 INJECTION INTRAMUSCULAR; INTRAVENOUS ONCE
Status: COMPLETED | OUTPATIENT
Start: 2022-03-15 | End: 2022-03-15

## 2022-03-15 RX ORDER — LIDOCAINE 50 MG/G
1 PATCH TOPICAL ONCE
Status: DISCONTINUED | OUTPATIENT
Start: 2022-03-15 | End: 2022-03-15 | Stop reason: HOSPADM

## 2022-03-15 RX ORDER — METHOCARBAMOL 500 MG/1
500 TABLET, FILM COATED ORAL ONCE
Status: COMPLETED | OUTPATIENT
Start: 2022-03-15 | End: 2022-03-15

## 2022-03-15 RX ADMIN — MORPHINE SULFATE 4 MG: 4 INJECTION INTRAVENOUS at 13:28

## 2022-03-15 RX ADMIN — PREDNISONE 40 MG: 20 TABLET ORAL at 17:01

## 2022-03-15 RX ADMIN — HYDROMORPHONE HYDROCHLORIDE 0.2 MG: 1 INJECTION, SOLUTION INTRAMUSCULAR; INTRAVENOUS; SUBCUTANEOUS at 17:01

## 2022-03-15 RX ADMIN — ONDANSETRON 4 MG: 2 INJECTION INTRAMUSCULAR; INTRAVENOUS at 13:28

## 2022-03-15 RX ADMIN — LIDOCAINE 5% 1 PATCH: 700 PATCH TOPICAL at 15:16

## 2022-03-15 RX ADMIN — METHOCARBAMOL 500 MG: 500 TABLET ORAL at 15:15

## 2022-03-15 NOTE — ED PROVIDER NOTES
History  Chief Complaint   Patient presents with    Neck Pain     woke up with neck pain yesterday radiating into posterior head  Also c/o L arm numbness, chest pain, and generalized weakness     79year old female with PMH DM, arthritis, HTN, HLD, GERD, anemia, arthritis presents via EMS from home for evaluation of neck pain  Pt reports this started yesterday  She reports sharp stabbing pain in the back of her neck along the right side which radiates into the back of her head  She denies any recent falls  Denies injury or trauma  She reports pain worse with movement  She took hydrocodone/apap which she is prescribed for her knee around 3 am without relief  She reports numbness/tingling in both hands and tells me she's been diagnosed with carpal tunnel  No focal weakness  No radiating pain down arms or legs  She reports the pain now radiating into her chest   Denies SOB  She reports feeling nauseated from the pain  Denies V/D, abdominal pain  Denies recent illness  denies fever, chills  No prior neck surgeries  No injections  No prior evaluation of symptoms  History provided by:  Patient and medical records   used: No    Neck Pain  Pain location:  R side  Pain radiates to:  Head  Timing:  Constant  Progression:  Worsening  Chronicity:  New  Context: not fall and not recent injury    Relieved by:  Nothing  Worsened by:  Position  Associated symptoms: chest pain, headaches, numbness and tingling (hands - reports carpal tunnel)    Associated symptoms: no bladder incontinence, no bowel incontinence, no fever, no leg pain, no photophobia, no syncope, no visual change and no weakness    Risk factors: no hx of head and neck radiation, no hx of spinal trauma, no recent epidural, no recent head injury and no recurrent falls        Prior to Admission Medications   Prescriptions Last Dose Informant Patient Reported?  Taking?   acetaminophen (TYLENOL) 500 mg tablet   Yes No   Sig: Take 500 mg by mouth every 6 (six) hours as needed for mild pain   atorvastatin (LIPITOR) 20 mg tablet   Yes No   Sig: Take 20 mg by mouth daily   baclofen 10 mg tablet   Yes No   Sig: Take 10 mg by mouth 3 (three) times a day   ferrous sulfate 325 (65 Fe) mg tablet   Yes No   Sig: Take by mouth daily with breakfast   insulin aspart (NovoLOG) 100 units/mL injection   Yes No   Sig: Inject under the skin   levothyroxine 75 mcg tablet   Yes No   Sig: Take 75 mcg by mouth daily   metFORMIN (GLUCOPHAGE) 500 mg tablet   Yes No   Sig: Take 500 mg by mouth 2 (two) times a day with meals   mirtazapine (REMERON) 15 mg tablet   No No   Sig: Take 1 tablet (15 mg total) by mouth daily at bedtime   nitroglycerin (NITROSTAT) 0 4 mg SL tablet   Yes No   Sig: Place 0 4 mg under the tongue every 5 (five) minutes as needed for chest pain   omeprazole (PriLOSEC) 40 MG capsule   No No   Sig: Take 1 capsule (40 mg total) by mouth 2 (two) times a day before meals for 14 days   ondansetron (ZOFRAN-ODT) 4 mg disintegrating tablet   Yes No   Sig: Take 4 mg by mouth every 6 (six) hours as needed for nausea or vomiting   pantoprazole (PROTONIX) 40 mg tablet   Yes No   Sig: Take 40 mg by mouth daily      Facility-Administered Medications: None       Past Medical History:   Diagnosis Date    Arthritis     [facility]        History reviewed  No pertinent surgical history  History reviewed  No pertinent family history  I have reviewed and agree with the history as documented  E-Cigarette/Vaping     E-Cigarette/Vaping Substances     Social History     Tobacco Use    Smoking status: Never Smoker    Smokeless tobacco: Never Used   Substance Use Topics    Alcohol use: Never    Drug use: Never       Review of Systems   Constitutional: Negative  Negative for chills, fatigue and fever  HENT: Negative  Negative for congestion, rhinorrhea and sore throat  Eyes: Negative  Negative for photophobia and visual disturbance  Respiratory: Negative  Negative for cough, shortness of breath and wheezing  Cardiovascular: Positive for chest pain  Negative for palpitations, leg swelling and syncope  Gastrointestinal: Positive for nausea  Negative for abdominal pain, bowel incontinence, constipation, diarrhea and vomiting  Genitourinary: Negative  Negative for bladder incontinence, dysuria, flank pain, frequency and hematuria  Musculoskeletal: Positive for arthralgias and neck pain  Negative for back pain and myalgias  Skin: Negative  Negative for rash  Neurological: Positive for tingling (hands - reports carpal tunnel), numbness and headaches  Negative for dizziness, weakness and light-headedness  Psychiatric/Behavioral: Negative  Negative for confusion  All other systems reviewed and are negative  Physical Exam  Physical Exam  Vitals and nursing note reviewed  Constitutional:       General: She is in acute distress (appears uncomfortable)  Appearance: She is well-developed and overweight  She is not toxic-appearing  HENT:      Head: Normocephalic and atraumatic  Right Ear: Hearing, tympanic membrane, ear canal and external ear normal       Left Ear: Hearing, tympanic membrane, ear canal and external ear normal       Nose: Nose normal       Mouth/Throat:      Mouth: Mucous membranes are moist       Tongue: Tongue does not deviate from midline  Pharynx: Oropharynx is clear  Uvula midline  Eyes:      General: Lids are normal  No scleral icterus  Extraocular Movements: Extraocular movements intact  Conjunctiva/sclera: Conjunctivae normal       Pupils: Pupils are equal, round, and reactive to light  Neck:      Trachea: Trachea and phonation normal  No tracheal deviation  Meningeal: Brudzinski's sign and Kernig's sign absent  Comments: No overlying skin changes  Cardiovascular:      Rate and Rhythm: Normal rate and regular rhythm        Pulses:           Radial pulses are 2+ on the right side and 2+ on the left side  Dorsalis pedis pulses are 2+ on the right side and 2+ on the left side  Posterior tibial pulses are 2+ on the right side and 2+ on the left side  Heart sounds: Normal heart sounds, S1 normal and S2 normal  No murmur heard  Pulmonary:      Effort: Pulmonary effort is normal  No tachypnea or respiratory distress  Breath sounds: Normal breath sounds  No wheezing, rhonchi or rales  Abdominal:      General: Bowel sounds are normal  There is no distension  Palpations: Abdomen is soft  Tenderness: There is no abdominal tenderness  There is no guarding or rebound  Musculoskeletal:      Cervical back: Neck supple  No rigidity  Muscular tenderness present  Right lower leg: No edema  Left lower leg: No edema  Skin:     General: Skin is warm and dry  Capillary Refill: Capillary refill takes less than 2 seconds  Findings: No rash  Neurological:      General: No focal deficit present  Mental Status: She is alert and oriented to person, place, and time  GCS: GCS eye subscore is 4  GCS verbal subscore is 5  GCS motor subscore is 6  Cranial Nerves: No cranial nerve deficit  Sensory: No sensory deficit  Motor: No abnormal muscle tone  Psychiatric:         Mood and Affect: Mood is anxious           Speech: Speech normal          Behavior: Behavior normal          Vital Signs  ED Triage Vitals   Temperature Pulse Respirations Blood Pressure SpO2   03/15/22 1222 03/15/22 1222 03/15/22 1222 03/15/22 1222 03/15/22 1222   97 7 °F (36 5 °C) 102 18 164/98 96 %      Temp Source Heart Rate Source Patient Position - Orthostatic VS BP Location FiO2 (%)   03/15/22 1222 03/15/22 1222 03/15/22 1222 03/15/22 1230 --   Temporal Monitor Lying Left arm       Pain Score       03/15/22 1222       10 - Worst Possible Pain           Vitals:    03/15/22 1530 03/15/22 1630 03/15/22 1700 03/15/22 1730   BP: 153/82 148/66 142/81 120/73   Pulse: 79 83 81 85   Patient Position - Orthostatic VS:             Visual Acuity      ED Medications  Medications   lidocaine (LIDODERM) 5 % patch 1 patch (1 patch Topical Medication Applied 3/15/22 1516)   ondansetron (ZOFRAN) injection 4 mg (4 mg Intravenous Given 3/15/22 1328)   morphine (PF) 4 mg/mL injection 4 mg (4 mg Intravenous Given 3/15/22 1328)   methocarbamol (ROBAXIN) tablet 500 mg (500 mg Oral Given 3/15/22 1515)   predniSONE tablet 40 mg (40 mg Oral Given 3/15/22 1701)   HYDROmorphone (DILAUDID) injection 0 2 mg (0 2 mg Intravenous Given 3/15/22 1701)       Diagnostic Studies  Results Reviewed     Procedure Component Value Units Date/Time    HS Troponin I 2hr [[id number]]  (Normal) Collected: 03/15/22 1518    Lab Status: Final result Specimen: Blood from Arm, Right Updated: 03/15/22 1546     hs TnI 2hr 7 ng/L      Delta 2hr hsTnI 0 ng/L     HS Troponin I 4hr [[id number]]     Lab Status: No result Specimen: Blood     Comprehensive metabolic panel [[id number]] Collected: 03/15/22 1327    Lab Status: Final result Specimen: Blood from Arm, Right Updated: 03/15/22 1407     Sodium 139 mmol/L      Potassium 3 8 mmol/L      Chloride 103 mmol/L      CO2 25 mmol/L      ANION GAP 11 mmol/L      BUN 11 mg/dL      Creatinine 1 23 mg/dL      Glucose 109 mg/dL      Calcium 9 1 mg/dL      AST 18 U/L      ALT 13 U/L      Alkaline Phosphatase 64 U/L      Total Protein 7 7 g/dL      Albumin 4 0 g/dL      Total Bilirubin 0 44 mg/dL      eGFR 40 ml/min/1 73sq m     Narrative:      [place] guidelines for Chronic Kidney Disease (CKD):     Stage 1 with normal or high GFR (GFR > 90 mL/min/1 73 square meters)    Stage 2 Mild CKD (GFR = 60-89 mL/min/1 73 square meters)    Stage 3A Moderate CKD (GFR = 45-59 mL/min/1 73 square meters)    Stage 3B Moderate CKD (GFR = 30-44 mL/min/1 73 square meters)    Stage 4 Severe CKD (GFR = 15-29 mL/min/1 73 square meters)    Stage 5 End Stage CKD (GFR <15 mL/min/1 73 square meters)  Note: GFR calculation is accurate only with a steady state creatinine    Lipase [[id number]]  (Normal) Collected: 03/15/22 1327    Lab Status: Final result Specimen: Blood from Arm, Right Updated: 03/15/22 1407     Lipase 133 u/L     Magnesium [[id number]]  (Normal) Collected: 03/15/22 1327    Lab Status: Final result Specimen: Blood from Arm, Right Updated: 03/15/22 1407     Magnesium 2 1 mg/dL     HS Troponin 0hr (reflex protocol) [[id number]]  (Normal) Collected: 03/15/22 1327    Lab Status: Final result Specimen: Blood from Arm, Right Updated: 03/15/22 1404     hs TnI 0hr 7 ng/L     Protime-INR [[id number]]  (Normal) Collected: 03/15/22 1327    Lab Status: Final result Specimen: Blood from Arm, Right Updated: 03/15/22 1349     Protime 13 0 seconds      INR 1 03    APTT [[id number]]  (Normal) Collected: 03/15/22 1327    Lab Status: Final result Specimen: Blood from Arm, Right Updated: 03/15/22 1349     PTT 32 seconds     CBC and differential [[id number]]  (Abnormal) Collected: 03/15/22 1327    Lab Status: Final result Specimen: Blood from Arm, Right Updated: 03/15/22 1340     WBC 6 95 Thousand/uL      RBC 3 96 Million/uL      Hemoglobin 11 2 g/dL      Hematocrit 35 8 %      MCV 90 fL      MCH 28 3 pg      MCHC 31 3 g/dL      RDW 13 2 %      MPV 9 4 fL      Platelets 458 Thousands/uL      nRBC 0 /100 WBCs      Neutrophils Relative 72 %      Immat GRANS % 0 %      Lymphocytes Relative 20 %      Monocytes Relative 7 %      Eosinophils Relative 1 %      Basophils Relative 0 %      Neutrophils Absolute 4 94 Thousands/µL      Immature Grans Absolute 0 01 Thousand/uL      Lymphocytes Absolute 1 42 Thousands/µL      Monocytes Absolute 0 48 Thousand/µL      Eosinophils Absolute 0 07 Thousand/µL      Basophils Absolute 0 03 Thousands/µL                  CT cervical spine without contrast   Final Result by Kevon Preciado MD (03/15 1515)      No cervical spine fracture or traumatic malalignment  Workstation performed: [id number]         CT head without contrast   Final Result by Mani Lacy MD (03/15 1513)      No acute intracranial abnormality  Workstation performed: [id number]         XR chest 1 view portable    (Results Pending)              Procedures  ECG 12 Lead Documentation Only    Date/Time: 3/15/2022 1:35 PM  Performed by: Nupur Noel PA-C  Authorized by: Nupur Noel PA-C     Indications / Diagnosis:  Chest pain  ECG reviewed by me, the ED Provider: yes    Patient location:  ED  Previous ECG:     Previous ECG:  Compared to current    Comparison ECG info:  6/30/21    Similarity:  No change    Comparison to cardiac monitor: Yes    Interpretation:     Interpretation: non-specific    Rate:     ECG rate:  84    ECG rate assessment: normal    Rhythm:     Rhythm: sinus rhythm    Ectopy:     Ectopy: none    QRS:     QRS axis:  Normal    QRS intervals:  Normal  Conduction:     Conduction: normal    ST segments:     ST segments:  Non-specific  T waves:     T waves: normal    Comments:      , QRS 80, QT/QTc 354/418; no acute ischemic changes, no significant interval change from prior               ED Course  ED Course as of 03/15/22 1941   Tue Mar 15, 2022   1341 WBC: 6 95   1341 Hemoglobin(!): 11 2  Chronic, stable over the past eight months   1341 Platelet Count: 226   1351 POCT INR: 1 03   1351 PROTIME: 13 0   1351 PTT: 32   1406 hs TnI 0hr: 7   1408 Glucose, Random: 109   1408 Creatinine: 1 23  Baseline 1-1 1   1408 BUN: 11   1408 Sodium: 139   1408 Potassium: 3 8   1408 Chloride: 103   1408 CO2: 25   1408 Anion Gap: 11   1408 Calcium: 9 1   1408 AST: 18   1408 ALT: 13   1408 Alkaline Phosphatase: 64   1408 Total Protein: 7 7   1408 Albumin: 4 0   1408 TOTAL BILIRUBIN: 0 44   1408 eGFR: 40   1408 Magnesium: 2 1   1408 Lipase: 133   1409 XR chest 1 view portable  Independently viewed and interpreted by me - no acute cardiopulmonary process; pending official read    1409 CT scans performed and pending interpretation  1512 Pt having return of pain  1516 CT head without contrast  IMPRESSION:     No acute intracranial abnormality  1522 CT cervical spine without contrast  IMPRESSION:     No cervical spine fracture or traumatic malalignment  [address] hsTnI: 0   46 Pt and daughter updated on results of repeat troponin  Will give repeat dose of medication and start steroid taper  Anticipate discharge home and outpatient follow up with comprehensive spine  1740 Again reassessed  Feeling better after additional pian medication  Pt afebrile, nontoxic appearing  Pain reproducible on exam   Imaging shows DJD cervical spine  No red flags on exam   Discussed continued symptomatic/supportive care  Advised to alternate ice/heat, topical muscle rubs, lidocaine patches, etc   Rx prednisone - advised to take as directed for the full duration with food  Instructed to monitor blood sugars  Rx muscle relaxant - sedation precautions advised and instructed no driving while taking  Risks/benefits/side effects discussed  Strict return precautions outlined  Referral placed to comprehensive spine  Advised outpatient follow up with PCP or return to ER for change in condition as outlined  Pt and daughter verbalized understanding and had no further questions  Pt left in stable, improved condition  HEART Risk Score      Most Recent Value   Heart Score Risk Calculator    History 0 Filed at: 03/15/2022 1353   ECG 1 Filed at: 03/15/2022 1353   Age 2 Filed at: 03/15/2022 1353   Risk Factors 2 Filed at: 03/15/2022 1353   Troponin 0 Filed at: 03/15/2022 1353   HEART Score 5 Filed at: 03/15/2022 1353                        SBIRT 22yo+      Most Recent Value   SBIRT (25 yo +)    In order to provide better care to our patients, we are screening all of our patients for alcohol and drug use  Would it be okay to ask you these screening questions?  Yes Filed at: 03/15/2022 1332   Initial Alcohol Screen: US AUDIT-C     1  How often do you have a drink containing alcohol? 0 Filed at: 03/15/2022 1332   2  How many drinks containing alcohol do you have on a typical day you are drinking? 0 Filed at: 03/15/2022 1332   3b  FEMALE Any Age, or MALE 65+: How often do you have 4 or more drinks on one occassion? 0 Filed at: 03/15/2022 1332   Audit-C Score 0 Filed at: 03/15/2022 1332   JOSE: How many times in the past year have you    Used an illegal drug or used a prescription medication for non-medical reasons? Never Filed at: 03/15/2022 1332                    MDM  Number of Diagnoses or Management Options  DJD (degenerative joint disease) of cervical spine: new and requires workup  Neck pain, musculoskeletal: new and requires workup     Amount and/or Complexity of Data Reviewed  Clinical lab tests: ordered and reviewed  Tests in the radiology section of CPT®: ordered and reviewed  Decide to obtain previous medical records or to obtain history from someone other than the patient: yes  Obtain history from someone other than the patient: yes  Review and summarize past medical records: yes  Independent visualization of images, tracings, or specimens: yes    Patient Progress  Patient progress: improved      Disposition  Final diagnoses:   Neck pain, musculoskeletal   DJD (degenerative joint disease) of cervical spine     Time reflects when diagnosis was documented in both MDM as applicable and the Disposition within this note     Time User Action Codes Description Comment    3/15/2022  5:51 PM Patel Siddiqi Add [M54 2] Neck pain, musculoskeletal     3/15/2022  5:51 PM Patel Siddiqi Add [B72 428] DJD (degenerative joint disease) of cervical spine       ED Disposition     ED Disposition Condition Date/Time Comment    Discharge Stable Tue Mar 15, 2022  5:51 PM Janeth Chance discharge to home/self care              Follow-up Information     Follow up With Specialties Details Why Contact Info Additional [address] , DO Internal Medicine Schedule an appointment as soon as possible for a visit   [address] Emergency Department Emergency Medicine  As needed [place] [address] [phone]  70 [facility] Emergency Department, [address], [address]          Discharge Medication List as of 3/15/2022  5:54 PM      START taking these medications    Details   methocarbamol (ROBAXIN) 500 mg tablet Take 1 tablet (500 mg total) by mouth 2 (two) times a day as needed for muscle spasms, Starting Tue 3/15/2022, Normal      predniSONE 10 mg tablet Multiple Dosages:Starting Wed 3/16/2022, Until Thu 3/17/2022 at 2359, [address] Starting Fri 3/18/2022, Until Sun 3/20/2022 at 2359, THEN Starting Mon 3/21/2022, Until Wed 3/23/2022 at 2359Take 4 tablets (40 mg total) by mouth daily for 2 days, THEN 2 tab lets (20 mg total) daily for 3 days, THEN 1 tablet (10 mg total) daily for 3 days  , Normal         CONTINUE these medications which have NOT CHANGED    Details   acetaminophen (TYLENOL) 500 mg tablet Take 500 mg by mouth every 6 (six) hours as needed for mild pain, Historical Med      atorvastatin (LIPITOR) 20 mg tablet Take 20 mg by mouth daily, Historical Med      baclofen 10 mg tablet Take 10 mg by mouth 3 (three) times a day, Historical Med      ferrous sulfate 325 (65 Fe) mg tablet Take by mouth daily with breakfast, Historical Med      insulin aspart (NovoLOG) 100 units/mL injection Inject under the skin, Historical Med      levothyroxine 75 mcg tablet Take 75 mcg by mouth daily, Historical Med      metFORMIN (GLUCOPHAGE) 500 mg tablet Take 500 mg by mouth 2 (two) times a day with meals, Historical Med      mirtazapine (REMERON) 15 mg tablet Take 1 tablet (15 mg total) by mouth daily at bedtime, Starting Fri 7/2/2021, Normal      nitroglycerin (NITROSTAT) 0 4 mg SL tablet Place 0 4 mg under the tongue every 5 (five) minutes as needed for chest pain, Historical Med      omeprazole (PriLOSEC) 40 MG capsule Take 1 capsule (40 mg total) by mouth 2 (two) times a day before meals for 14 days, Starting Fri 7/16/2021, Until Fri 7/30/2021, Normal      ondansetron (ZOFRAN-ODT) 4 mg disintegrating tablet Take 4 mg by mouth every 6 (six) hours as needed for nausea or vomiting, Historical Med      pantoprazole (PROTONIX) 40 mg tablet Take 40 mg by mouth daily, Historical Med                 PDMP Review     None          ED Provider  Electronically Signed by           Jazz Thakur PA-C  03/15/22 1941

## 2022-03-15 NOTE — ED NOTES
Pt states last night started having right sided neck pain since then has worsened and is unbearable states its in her head neck and into arm  pt medicated labs sent ekg obtained call betzaida in reach      Lenny Cates RN  03/15/22 2320

## 2022-03-15 NOTE — ED NOTES
Pt states still having right neck pain that runs into arm and chest  Medicated and lidocaine patch placed on neck daughter at bedside trop drawn and sent call bell in reach     Lorna Apodaca RN  03/15/22 [id number]

## 2022-03-15 NOTE — DISCHARGE INSTRUCTIONS
Alternate ice/heat, topical muscle rubs, etc   Take steroid as directed with food  Caution sedation with muscle relaxant  Can also continue your pain medication that you already have prescribed  Follow up with PCP or return to ER as needed

## 2022-03-18 ENCOUNTER — TELEPHONE (OUTPATIENT)
Dept: PHYSICAL THERAPY | Facility: OTHER | Age: 82
End: 2022-03-18

## 2022-03-18 NOTE — TELEPHONE ENCOUNTER
Nurse reached out to discuss recent referral entered for  [facility] program and offerings  Nurse identified self, program and reason for call with the patient  Patient declined to participate in the program at this time as she stated, " I can't get anywhere right now"  Nurse reviewed the program again and reminded triage would be to enter a referral for her to be seen for thorough evaluation of her neck  Patient then stated she could not take the "one pill" as it made her ill  Nurse discussed the need to f/u with her primary after an ED visit and patient said, "I have a doctor that comes to my home"  Nurse confirmed patient is taking her prednisone and AS DIRECTED  Reminded she should not abruptly stop  Nurse also reviewed the RTED s/s with her  Nurse had to get patient "back to topic" several times throughout conversation  She was very pleasant and answered questions appropriately during encounter  Nurse strongly recommended pt contact her PCP (she stated she has two) to assist in moving forward with treatment  Patient took [facility] number and stated she will think about it and talk with her doctor  Nurse respected pts decision  Nurse confirmed patient has [facility] contact information and encouraged to call program back if needed  Patient agreed and very appreciative of call and discussion  Nurse wished her well and referral closed per protocol

## 2022-06-09 ENCOUNTER — TELEPHONE (OUTPATIENT)
Dept: FAMILY MEDICINE CLINIC | Facility: CLINIC | Age: 82
End: 2022-06-09

## 2022-09-29 ENCOUNTER — OFFICE VISIT (OUTPATIENT)
Dept: URGENT CARE | Facility: CLINIC | Age: 82
End: 2022-09-29

## 2022-09-29 DIAGNOSIS — R94.31 ABNORMAL EKG: Primary | ICD-10-CM

## 2022-10-12 ENCOUNTER — HOSPITAL ENCOUNTER (EMERGENCY)
Facility: HOSPITAL | Age: 82
Discharge: HOME | End: 2022-10-12
Attending: EMERGENCY MEDICINE
Payer: OTHER GOVERNMENT

## 2022-10-12 VITALS
RESPIRATION RATE: 18 BRPM | HEART RATE: 87 BPM | BODY MASS INDEX: 24.48 KG/M2 | OXYGEN SATURATION: 98 % | WEIGHT: 142.64 LBS | TEMPERATURE: 97.8 F | DIASTOLIC BLOOD PRESSURE: 73 MMHG | SYSTOLIC BLOOD PRESSURE: 148 MMHG

## 2022-10-12 DIAGNOSIS — T78.40XA ACUTE ALLERGIC REACTION, INITIAL ENCOUNTER: Primary | ICD-10-CM

## 2022-10-12 PROCEDURE — 99284 EMERGENCY DEPT VISIT MOD MDM: CPT | Performed by: PHYSICIAN ASSISTANT

## 2022-10-12 PROCEDURE — 96361 HYDRATE IV INFUSION ADD-ON: CPT

## 2022-10-12 PROCEDURE — 93005 ELECTROCARDIOGRAM TRACING: CPT

## 2022-10-12 PROCEDURE — 99284 EMERGENCY DEPT VISIT MOD MDM: CPT

## 2022-10-12 PROCEDURE — 96375 TX/PRO/DX INJ NEW DRUG ADDON: CPT

## 2022-10-12 PROCEDURE — 96374 THER/PROPH/DIAG INJ IV PUSH: CPT

## 2022-10-12 RX ORDER — ACETAMINOPHEN 325 MG/1
650 TABLET ORAL ONCE
Status: COMPLETED | OUTPATIENT
Start: 2022-10-12 | End: 2022-10-12

## 2022-10-12 RX ORDER — DIPHENHYDRAMINE HYDROCHLORIDE 50 MG/ML
25 INJECTION INTRAMUSCULAR; INTRAVENOUS ONCE
Status: COMPLETED | OUTPATIENT
Start: 2022-10-12 | End: 2022-10-12

## 2022-10-12 RX ORDER — PREDNISONE 50 MG/1
50 TABLET ORAL DAILY
Qty: 5 TABLET | Refills: 0 | Status: SHIPPED | OUTPATIENT
Start: 2022-10-12

## 2022-10-12 RX ORDER — FAMOTIDINE 10 MG/ML
20 INJECTION, SOLUTION INTRAVENOUS ONCE
Status: COMPLETED | OUTPATIENT
Start: 2022-10-12 | End: 2022-10-12

## 2022-10-12 RX ORDER — EPINEPHRINE 0.3 MG/.3ML
0.3 INJECTION SUBCUTANEOUS ONCE
Qty: 0.6 ML | Refills: 0 | Status: SHIPPED | OUTPATIENT
Start: 2022-10-12 | End: 2022-10-12

## 2022-10-12 RX ADMIN — DIPHENHYDRAMINE HYDROCHLORIDE 25 MG: 50 INJECTION, SOLUTION INTRAMUSCULAR; INTRAVENOUS at 18:29

## 2022-10-12 RX ADMIN — SODIUM CHLORIDE 1000 ML: 0.9 INJECTION, SOLUTION INTRAVENOUS at 18:00

## 2022-10-12 RX ADMIN — ACETAMINOPHEN 650 MG: 325 TABLET, FILM COATED ORAL at 19:48

## 2022-10-12 RX ADMIN — FAMOTIDINE 20 MG: 10 INJECTION, SOLUTION INTRAVENOUS at 18:00

## 2022-10-12 NOTE — ED PROVIDER NOTES
History  Chief Complaint   Patient presents with   • Allergic Reaction     Patient was stung by numerous bees about 40 mins ago  EMS states when they arrived on scene the patient was very diaphoretic and confused  EMS gave 0 3 of epi and 125 of solumedrol  Patient is alert and oriented on arrival  Patient states she felt like her throat was closing and had chest pain afterwards  Patient complains of mild chest pain at this time  Patient presents to the emergency department today for evaluation multiple yellow jacket stings  She presents via emergency medical services  She provides her own history stating she was in her backyard around her septic [place] when she was bitten by a multitude of yellow jackets  Bites ranged from anywhere on her body  She throat was closing she called EMS  Patient received 0 3 mg of epinephrine via intramuscular injection followed by 125 mg of Solu-Medrol IV per paramedic  She is feeling some improvement now  She is nontoxic appearing at bedside no evidence of stridor  Prior to Admission Medications   Prescriptions Last Dose Informant Patient Reported?  Taking?   acetaminophen (TYLENOL) 500 mg tablet   Yes No   Sig: Take 500 mg by mouth every 6 (six) hours as needed for mild pain   atorvastatin (LIPITOR) 20 mg tablet   Yes No   Sig: Take 20 mg by mouth daily   baclofen 10 mg tablet   Yes No   Sig: Take 10 mg by mouth 3 (three) times a day   ferrous sulfate 325 (65 Fe) mg tablet   Yes No   Sig: Take by mouth daily with breakfast   insulin aspart (NovoLOG) 100 units/mL injection   Yes No   Sig: Inject under the skin   levothyroxine 75 mcg tablet   Yes No   Sig: Take 75 mcg by mouth daily   metFORMIN (GLUCOPHAGE) 500 mg tablet   Yes No   Sig: Take 500 mg by mouth 2 (two) times a day with meals   methocarbamol (ROBAXIN) 500 mg tablet   No No   Sig: Take 1 tablet (500 mg total) by mouth 2 (two) times a day as needed for muscle spasms   mirtazapine (REMERON) 15 mg tablet   No No   Sig: Take 1 tablet (15 mg total) by mouth daily at bedtime   nitroglycerin (NITROSTAT) 0 4 mg SL tablet   Yes No   Sig: Place 0 4 mg under the tongue every 5 (five) minutes as needed for chest pain   omeprazole (PriLOSEC) 40 MG capsule   No No   Sig: Take 1 capsule (40 mg total) by mouth 2 (two) times a day before meals for 14 days   ondansetron (ZOFRAN-ODT) 4 mg disintegrating tablet   Yes No   Sig: Take 4 mg by mouth every 6 (six) hours as needed for nausea or vomiting   pantoprazole (PROTONIX) 40 mg tablet   Yes No   Sig: Take 40 mg by mouth daily      Facility-Administered Medications: None       Past Medical History:   Diagnosis Date   • Arthritis    • [facility]        History reviewed  No pertinent surgical history  History reviewed  No pertinent family history  I have reviewed and agree with the history as documented  E-Cigarette/Vaping     E-Cigarette/Vaping Substances     Social History     Tobacco Use   • Smoking status: Never Smoker   • Smokeless tobacco: Never Used   Substance Use Topics   • Alcohol use: Never   • Drug use: Never       Review of Systems   Constitutional: Negative for chills and fever  HENT: Negative for ear pain  Feeling of throat closure   Eyes: Negative for pain and visual disturbance  Respiratory: Positive for shortness of breath  Negative for cough  Cardiovascular: Negative for palpitations  Gastrointestinal: Negative for abdominal pain and vomiting  Genitourinary: Negative for dysuria and hematuria  Musculoskeletal: Negative for arthralgias and back pain  Skin: Positive for rash  Negative for color change  Neurological: Negative for seizures and syncope  All other systems reviewed and are negative  Physical Exam  Physical Exam  Vitals reviewed  Constitutional:       General: She is not in acute distress  Appearance: She is well-developed  She is not ill-appearing or diaphoretic     HENT:      Right Ear: External ear normal  No swelling  Tympanic membrane is not bulging  Left Ear: External ear normal  No swelling  Tympanic membrane is not bulging  Nose: Nose normal       Mouth/Throat:      Pharynx: No oropharyngeal exudate  Comments: Normal vesicular sounds of the neck  Eyes:      General: Lids are normal       Conjunctiva/sclera: Conjunctivae normal       Pupils: Pupils are equal, round, and reactive to light  Neck:      Thyroid: No thyromegaly  Vascular: No JVD  Trachea: No tracheal deviation  Cardiovascular:      Rate and Rhythm: Normal rate and regular rhythm  Pulses: Normal pulses  Heart sounds: Normal heart sounds  No murmur heard  No friction rub  No gallop  Pulmonary:      Effort: Pulmonary effort is normal  No respiratory distress  Breath sounds: Normal breath sounds  No stridor  No wheezing, rhonchi or rales  Chest:      Chest wall: No tenderness  Abdominal:      General: Bowel sounds are normal  There is no distension  Palpations: Abdomen is soft  There is no mass  Tenderness: There is no abdominal tenderness  There is no guarding or rebound  Negative signs include El's sign  Hernia: No hernia is present  Musculoskeletal:         General: No tenderness  Normal range of motion  Cervical back: Normal range of motion and neck supple  No edema  Normal range of motion  Lymphadenopathy:      Cervical: No cervical adenopathy  Skin:     General: Skin is warm and dry  Capillary Refill: Capillary refill takes less than 2 seconds  Coloration: Skin is not pale  Findings: No erythema or rash  Comments: Numerous apparent bee stings of the thorax arms and legs  Neurological:      Mental Status: She is alert and oriented to person, place, and time  GCS: GCS eye subscore is 4  GCS verbal subscore is 5  GCS motor subscore is 6  Cranial Nerves: No cranial nerve deficit  Sensory: No sensory deficit        Deep Tendon Reflexes: Reflexes are normal and symmetric     Psychiatric:         Speech: Speech normal          Behavior: Behavior normal          Vital Signs  ED Triage Vitals   Temperature Pulse Respirations Blood Pressure SpO2   10/12/22 1737 10/12/22 1734 10/12/22 1734 10/12/22 1734 10/12/22 1734   97 8 °F (36 6 °C) 93 17 131/87 97 %      Temp Source Heart Rate Source Patient Position - Orthostatic VS BP Location FiO2 (%)   10/12/22 1737 10/12/22 1734 -- -- --   Temporal Monitor         Pain Score       10/12/22 1734       3           Vitals:    10/12/22 1900 10/12/22 1930 10/12/22 2000 10/12/22 2030   BP:  142/95 122/65 148/73   Pulse: 92 88 86 87         Visual Acuity      ED Medications  Medications   sodium chloride 0 9 % bolus 1,000 mL (0 mL Intravenous Stopped 10/12/22 1942)   Famotidine (PF) (PEPCID) injection 20 mg (20 mg Intravenous Given 10/12/22 1800)   diphenhydrAMINE (BENADRYL) injection 25 mg (25 mg Intravenous Given 10/12/22 1829)   acetaminophen (TYLENOL) tablet 650 mg (650 mg Oral Given 10/12/22 1948)       Diagnostic Studies  Results Reviewed     None                 No orders to display              Procedures  ECG 12 Lead Documentation Only    Date/Time: 10/12/2022 5:42 PM  Performed by: Kristen Chavez PA-C  Authorized by: Kristen Chavez PA-C     Indications / Diagnosis:  Chest pressure  ECG reviewed by me, the ED Provider: yes    Patient location:  ED  Previous ECG:     Comparison to cardiac monitor: Yes    Interpretation:     Interpretation: normal    Rate:     ECG rate:  85    ECG rate assessment: normal    Rhythm:     Rhythm: sinus rhythm    Ectopy:     Ectopy: none    QRS:     QRS axis:  Normal    QRS intervals:  Normal  Conduction:     Conduction: normal    ST segments:     ST segments:  Normal  T waves:     T waves: normal               ED Course  ED Course as of 10/12/22 2101   Wed Oct 12, 2022   1738 Temperature: 97 8 °F (36 6 °C)   1738 Pulse: 93   1738 Respirations: 17   1738 SpO2: 97 % 2054 Patient was re-evaluated again at bedside at the 3-1/2 hour lex of her visit  No wheezing  No evidence of decreased breath sounds  No posterior pharyngeal edema  She is stable for discharge  Vital signs within reasonable limits   2059 Was counseled on medications to have on standby at home in case this happens again  2059 Will refill her EpiPen                                             MDM    Disposition  Final diagnoses:   Acute allergic reaction, initial encounter     Time reflects when diagnosis was documented in both MDM as applicable and the Disposition within this note     Time User Action Codes Description Comment    10/12/2022  8:59 PM Rajesh Ross Add [T78 [id number]] Acute allergic reaction, initial encounter       ED Disposition     ED Disposition   Discharge    Condition   Stable    Date/Time   Wed Oct 12, 2022  8:59 PM    Comment   Princess Page discharge to home/self care  Follow-up Information     Follow up With Specialties Details Why [address], DO Internal Medicine Schedule an appointment as soon as possible for a visit  As needed [address] [place] [address]  [phone]            Patient's Medications   Discharge Prescriptions    EPINEPHRINE (EPIPEN) 0 3 MG/0 3 ML SOAJ    Inject 0 3 mL (0 3 mg total) into a muscle once for 1 dose       Start Date: 10/12/2022End Date: 10/12/2022       Order Dose: 0 3 mg       Quantity: 0 6 mL    Refills: 0    PREDNISONE 50 MG TABLET    Take 1 tablet (50 mg total) by mouth daily Take 1 tablet as needed daily for allergic reaction       Start Date: 10/12/2022End Date: --       Order Dose: 50 mg       Quantity: 5 tablet    Refills: 0       No discharge procedures on file      PDMP Review     None          ED Provider  Electronically Signed by           Janeth Peters PA-C  10/12/22 2101

## 2022-10-14 LAB
ATRIAL RATE: 85 BPM
P AXIS: 37 DEGREES
PR INTERVAL: 146 MS
QRS AXIS: 36 DEGREES
QRSD INTERVAL: 78 MS
QT INTERVAL: 376 MS
QTC INTERVAL: 447 MS
T WAVE AXIS: 16 DEGREES
VENTRICULAR RATE: 85 BPM

## 2022-10-14 PROCEDURE — 93010 ELECTROCARDIOGRAM REPORT: CPT | Performed by: INTERNAL MEDICINE

## 2022-11-09 ENCOUNTER — OFFICE VISIT (OUTPATIENT)
Dept: URGENT CARE | Facility: CLINIC | Age: 82
End: 2022-11-09

## 2022-11-09 VITALS
TEMPERATURE: 99.2 F | RESPIRATION RATE: 20 BRPM | HEART RATE: 80 BPM | DIASTOLIC BLOOD PRESSURE: 78 MMHG | WEIGHT: 136 LBS | SYSTOLIC BLOOD PRESSURE: 122 MMHG | BODY MASS INDEX: 26.7 KG/M2 | HEIGHT: 60 IN | OXYGEN SATURATION: 99 %

## 2022-11-09 DIAGNOSIS — R07.9 CHEST PAIN, UNSPECIFIED TYPE: Primary | ICD-10-CM

## 2022-11-09 RX ORDER — HYDROCODONE BITARTRATE AND ACETAMINOPHEN 5; 325 MG/1; MG/1
1 TABLET ORAL 3 TIMES DAILY
COMMUNITY
Start: 2022-10-20

## 2022-11-09 NOTE — PROGRESS NOTES
[facility]        NAME: Abi Curran is a 79 y o  female  : 1940    MRN: [id number]  DATE: 2022  TIME: 4:31 PM    Assessment and Plan   Chest pain, unspecified type [R07 9]  1  Chest pain, unspecified type  ECG 12 lead         Patient Instructions   Patient Instructions     Chest Pain   WHAT YOU NEED TO KNOW:   Chest pain can be caused by a range of conditions, from not serious to life-threatening  Chest pain can be a symptom of a digestive problem, such as acid reflux or a stomach ulcer  An anxiety attack or a strong emotion, such as anger, can also cause chest pain  Infection, inflammation, or a fracture in the bones or cartilage in your chest can cause pain or discomfort  Sometimes chest pain or pressure is caused by poor blood flow to your heart (angina)  Chest pain may also be caused by life-threatening conditions such as a heart attack or blood clot in your lungs  DISCHARGE INSTRUCTIONS:   Call your local emergency number (911 in the [address] [place] [address],[address]) or have someone call if:   1  You have any of the following signs of a heart attack:      1  Squeezing, pressure, or pain in your chest    2  You may  also have any of the following:     § Discomfort or pain in your back, neck, jaw, stomach, or arm    § Shortness of breath    § Nausea or vomiting    § Lightheadedness or a sudden cold sweat      Return to the emergency department if:   · You have chest discomfort that gets worse, even with medicine  · You cough or vomit blood  · Your bowel movements are black or bloody  · You cannot stop vomiting, or it hurts to swallow  Call your doctor if:   · You have questions or concerns about your condition or care  Medicines:   · Medicines  may be given to treat the cause of your chest pain  Examples include pain medicine, anxiety medicine, or medicines to increase blood flow to your heart      · Do not take certain medicines without asking your healthcare provider first   These include NSAIDs, herbal or vitamin supplements, and hormones, such as estrogen or progestin  · Take your medicine as directed  Contact your healthcare provider if you think your medicine is not helping or if you have side effects  Tell him or her if you are allergic to any medicine  Keep a list of the medicines, vitamins, and herbs you take  Include the amounts, and when and why you take them  Bring the list or the pill bottles to follow-up visits  Carry your medicine list with you in case of an emergency  Healthy living tips: If the cause of your chest pain is known, your healthcare provider will give you specific guidelines to follow  The following are general healthy guidelines:  1  Do not smoke  Nicotine and other chemicals in cigarettes and cigars can cause lung and heart damage  Ask your healthcare provider for information if you currently smoke and need help to quit  E-cigarettes or smokeless tobacco still contain nicotine  Talk to your healthcare provider before you use these products  2  Choose a variety of healthy foods as often as possible  Include fresh, frozen, or canned fruits and vegetables  Also include low-fat dairy products, fish, chicken (without skin), and lean meats  Your healthcare provider or a dietitian can help you create meal plans  You may need to avoid certain foods or drinks if your pain is caused by a digestion problem  3  Lower your sodium (salt) intake  Limit foods that are high in sodium, such as canned foods, salty snacks, and cold cuts  If you add salt when you cook food, do not add more at the table  Choose low-sodium canned foods as much as possible  4  Drink plenty of water every day  Water helps your body to control your temperature and blood pressure  Ask your healthcare provider how much water you should drink every day  5  Ask about activity  Your healthcare provider will tell you which activities to limit or avoid   Ask when you can drive, return to work, and have sex  Ask about the best exercise plan for you  6  Maintain a healthy weight  Ask your healthcare provider what a healthy weight is for you  Ask him or her to help you create a safe weight loss plan if you are overweight  7  Ask about vaccines you may need  Your healthcare provider can tell you which vaccines you need, and when to get them  The following vaccines help prevent diseases that can become serious for a person with a heart condition:    1  The influenza (flu) vaccine is given each year  Get a flu vaccine as soon as recommended, usually in September or October  2  The pneumonia vaccine is usually given every 5 years  Your healthcare provider may recommend the pneumonia vaccine if you are 72 or older  3  COVID-19 vaccines are given to adults as a shot in 1 or 2 doses  Vaccination is recommended for all adults  A booster (additional) dose is also recommended to help your immune system continue to protect against severe COVID-19  The booster can be a different brand of the COVID-19 vaccine than you originally received  The timing for the booster depends on the type of vaccine you received:    § 1-dose vaccine: The booster is given at least 2 months after you received the vaccine  § 2-dose vaccine: The booster is given at least 5 to 6 months after the second dose  Follow up with your doctor within 72 hours, or as directed: You may need to return for more tests to find the cause of your chest pain  You may be referred to a specialist, such as a cardiologist or gastroenterologist  Write down your questions so you remember to ask them during your visits  © Copyright [employer] 2022 Information is for End User's use only and may not be sold, redistributed or otherwise used for commercial purposes   All illustrations and images included in CareNotes® are the copyrighted property of [employer]  or Kristyn Alvarado  The above information is an  only  It is not intended as medical advice for individual conditions or treatments  Talk to your doctor, nurse or pharmacist before following any medical regimen to see if it is safe and effective for you  Follow up with PCP in 3-5 days  Proceed to  ER if symptoms worsen  Chief Complaint     Chief Complaint   Patient presents with   • Chest Pain   • Fatigue         History of Present Illness       The patient is an 74-year-old female with past medical history significant for type 2 diabetes, hypertension, hypothyroidism, and hyperlipidemia who presents to the clinic complaining of chest pain for the past 24 hours  The patient states that she was raking leaves few days ago and noticed a slight amount of chest pain on the left side  The patient is already on hydrocodone for chronic pain syndrome  She states that she did take this which seemed to help with her chest pain however last night she woke up at 12:00 a m  With pains in the left side of her chest   She states that the pain radiates to her left ribs  She denies associated shoulder pain, arm pain, neck pain, or jaw pain  The patient states that she was seen in the ER approximately 1 month ago for similar symptoms  She states that she had a cardiac workup at that time that was negative  She denies any history of coronary artery disease however she does have nitroglycerin at home which she used years ago  She states that she has not had a stress test for many years  She does complain of episodes of dyspnea on exertion  She states the symptoms are worse with moving  She describes the pain as a burning pain that is approximately 8/10  She denies associated weakness  Review of Systems   Review of Systems   Constitutional: Positive for activity change  Negative for chills and fever  HENT: Negative for ear pain and sore throat  Eyes: Negative for pain and visual disturbance  Respiratory: Positive for shortness of breath   Negative for cough and wheezing  Cardiovascular: Positive for chest pain and palpitations  Gastrointestinal: Negative for abdominal pain, nausea and vomiting  Genitourinary: Negative for dysuria and hematuria  Musculoskeletal: Negative for arthralgias and back pain  Skin: Negative for color change and rash  Neurological: Negative for dizziness, seizures, syncope and headaches  All other systems reviewed and are negative          Current Medications       Current Outpatient Medications:   •  acetaminophen (TYLENOL) 500 mg tablet, Take 500 mg by mouth every 6 (six) hours as needed for mild pain, Disp: , Rfl:   •  atorvastatin (LIPITOR) 20 mg tablet, Take 20 mg by mouth daily, Disp: , Rfl:   •  baclofen 10 mg tablet, Take 10 mg by mouth 3 (three) times a day, Disp: , Rfl:   •  EPINEPHrine (EPIPEN) 0 3 mg/0 3 mL SOAJ, Inject 0 3 mL (0 3 mg total) into a muscle once for 1 dose, Disp: 0 6 mL, Rfl: 0  •  ferrous sulfate 325 (65 Fe) mg tablet, Take by mouth daily with breakfast, Disp: , Rfl:   •  HYDROcodone-acetaminophen (NORCO) 5-325 mg per tablet, Take 1 tablet by mouth 3 (three) times a day, Disp: , Rfl:   •  insulin aspart (NovoLOG) 100 units/mL injection, Inject under the skin, Disp: , Rfl:   •  levothyroxine 75 mcg tablet, Take 75 mcg by mouth daily, Disp: , Rfl:   •  metFORMIN (GLUCOPHAGE) 500 mg tablet, Take 500 mg by mouth 2 (two) times a day with meals, Disp: , Rfl:   •  methocarbamol (ROBAXIN) 500 mg tablet, Take 1 tablet (500 mg total) by mouth 2 (two) times a day as needed for muscle spasms, Disp: 20 tablet, Rfl: 0  •  mirtazapine (REMERON) 15 mg tablet, Take 1 tablet (15 mg total) by mouth daily at bedtime, Disp: 30 tablet, Rfl: 0  •  nitroglycerin (NITROSTAT) 0 4 mg SL tablet, Place 0 4 mg under the tongue every 5 (five) minutes as needed for chest pain, Disp: , Rfl:   •  omeprazole (PriLOSEC) 40 MG capsule, Take 1 capsule (40 mg total) by mouth 2 (two) times a day before meals for 14 days, Disp: 28 capsule, Rfl: 0  •  ondansetron (ZOFRAN-ODT) 4 mg disintegrating tablet, Take 4 mg by mouth every 6 (six) hours as needed for nausea or vomiting, Disp: , Rfl:   •  pantoprazole (PROTONIX) 40 mg tablet, Take 40 mg by mouth daily, Disp: , Rfl:     Current Allergies     Allergies as of 11/09/2022 - Reviewed 11/09/2022   Allergen Reaction Noted   • Penicillins Other (See Comments), Shortness Of Breath, and Throat Swelling 10/03/2019   • Dye [iodinated diagnostic agents] Nausea Only 02/14/2021   • Propoxyphene Nausea Only 03/15/2022   • Latex Rash 04/16/2021            The following portions of the patient's history were reviewed and updated as appropriate: allergies, current medications, past family history, past medical history, past social history, past surgical history and problem list      Past Medical History:   Diagnosis Date   • Arthritis    • Diabetes mellitus ([facility] )    • Hypertension        History reviewed  No pertinent surgical history  History reviewed  No pertinent family history  Medications have been verified  Objective   /78   Pulse 80   Temp 99 2 °F (37 3 °C)   Resp 20   Ht 5' (1 524 m)   Wt 61 7 kg (136 lb)   SpO2 99%   BMI 26 56 kg/m²        Physical Exam     Physical Exam  Constitutional:       Appearance: She is well-developed  She is not diaphoretic  HENT:      Head: Normocephalic  Eyes:      General:         Right eye: No discharge  Left eye: No discharge  Pupils: Pupils are equal, round, and reactive to light  Neck:      Thyroid: No thyromegaly  Cardiovascular:      Rate and Rhythm: Normal rate and regular rhythm  Heart sounds: No murmur heard  Pulmonary:      Effort: Pulmonary effort is normal  No respiratory distress  Breath sounds: No wheezing, rhonchi or rales  Chest:      Chest wall: No tenderness  Comments: There is tenderness noted at the anterior chest wall to palpation    There is no significant edema or ecchymosis  Abdominal:      General: There is no distension  Palpations: Abdomen is soft  Tenderness: There is no abdominal tenderness  There is no guarding or rebound  Musculoskeletal:         General: Normal range of motion  Cervical back: Normal range of motion  Lymphadenopathy:      Cervical: No cervical adenopathy  Skin:     General: Skin is warm  Neurological:      Mental Status: She is alert and oriented to person, place, and time              -I suggested ER for further evaluation as the patient is aware that acute cardiac syndrome cannot be ruled out with EKG alone  The patient refuses to go to the ER and states that she will contact her primary care doctor  She does have chest wall pain however given her advanced age and medical problems she should have further workup if symptoms fail to improve  She may need a stress test   I suggest ER if symptoms worsen  She already has hydrocodone for pain    I suggest ice and rest

## 2022-11-09 NOTE — PATIENT INSTRUCTIONS
Chest Pain   WHAT YOU NEED TO KNOW:   Chest pain can be caused by a range of conditions, from not serious to life-threatening  Chest pain can be a symptom of a digestive problem, such as acid reflux or a stomach ulcer  An anxiety attack or a strong emotion, such as anger, can also cause chest pain  Infection, inflammation, or a fracture in the bones or cartilage in your chest can cause pain or discomfort  Sometimes chest pain or pressure is caused by poor blood flow to your heart (angina)  Chest pain may also be caused by life-threatening conditions such as a heart attack or blood clot in your lungs  DISCHARGE INSTRUCTIONS:   Call your local emergency number (911 in the [address] [facility]) or have someone call if:   You have any of the following signs of a heart attack:      Squeezing, pressure, or pain in your chest    You may  also have any of the following:     Discomfort or pain in your back, neck, jaw, stomach, or arm    Shortness of breath    Nausea or vomiting    Lightheadedness or a sudden cold sweat      Return to the emergency department if:   You have chest discomfort that gets worse, even with medicine  You cough or vomit blood  Your bowel movements are black or bloody  You cannot stop vomiting, or it hurts to swallow  Call your doctor if:   You have questions or concerns about your condition or care  Medicines:   Medicines  may be given to treat the cause of your chest pain  Examples include pain medicine, anxiety medicine, or medicines to increase blood flow to your heart  Do not take certain medicines without asking your healthcare provider first   These include NSAIDs, herbal or vitamin supplements, and hormones, such as estrogen or progestin  Take your medicine as directed  Contact your healthcare provider if you think your medicine is not helping or if you have side effects  Tell him or her if you are allergic to any medicine  Keep a list of the medicines, vitamins, and herbs you take  Include the amounts, and when and why you take them  Bring the list or the pill bottles to follow-up visits  Carry your medicine list with you in case of an emergency  Healthy living tips: If the cause of your chest pain is known, your healthcare provider will give you specific guidelines to follow  The following are general healthy guidelines:  Do not smoke  Nicotine and other chemicals in cigarettes and cigars can cause lung and heart damage  Ask your healthcare provider for information if you currently smoke and need help to quit  E-cigarettes or smokeless tobacco still contain nicotine  Talk to your healthcare provider before you use these products  Choose a variety of healthy foods as often as possible  Include fresh, frozen, or canned fruits and vegetables  Also include low-fat dairy products, fish, chicken (without skin), and lean meats  Your healthcare provider or a dietitian can help you create meal plans  You may need to avoid certain foods or drinks if your pain is caused by a digestion problem  Lower your sodium (salt) intake  Limit foods that are high in sodium, such as canned foods, salty snacks, and cold cuts  If you add salt when you cook food, do not add more at the table  Choose low-sodium canned foods as much as possible  Drink plenty of water every day  Water helps your body to control your temperature and blood pressure  Ask your healthcare provider how much water you should drink every day  Ask about activity  Your healthcare provider will tell you which activities to limit or avoid  Ask when you can drive, return to work, and have sex  Ask about the best exercise plan for you  Maintain a healthy weight  Ask your healthcare provider what a healthy weight is for you  Ask him or her to help you create a safe weight loss plan if you are overweight  Ask about vaccines you may need    Your healthcare provider can tell you which vaccines you need, and when to get them  The following vaccines help prevent diseases that can become serious for a person with a heart condition:    The influenza (flu) vaccine is given each year  Get a flu vaccine as soon as recommended, usually in September or October  The pneumonia vaccine is usually given every 5 years  Your healthcare provider may recommend the pneumonia vaccine if you are 72 or older  COVID-19 vaccines are given to adults as a shot in 1 or 2 doses  Vaccination is recommended for all adults  A booster (additional) dose is also recommended to help your immune system continue to protect against severe COVID-19  The booster can be a different brand of the COVID-19 vaccine than you originally received  The timing for the booster depends on the type of vaccine you received:    1-dose vaccine: The booster is given at least 2 months after you received the vaccine  2-dose vaccine: The booster is given at least 5 to 6 months after the second dose  Follow up with your doctor within 72 hours, or as directed: You may need to return for more tests to find the cause of your chest pain  You may be referred to a specialist, such as a cardiologist or gastroenterologist  Write down your questions so you remember to ask them during your visits  © Copyright [employer] 2022 Information is for End User's use only and may not be sold, redistributed or otherwise used for commercial purposes  All illustrations and images included in CareNotes® are the copyrighted property of [employer]  or Kristyn Alvarado  The above information is an  only  It is not intended as medical advice for individual conditions or treatments  Talk to your doctor, nurse or pharmacist before following any medical regimen to see if it is safe and effective for you

## 2023-08-07 ENCOUNTER — OFFICE VISIT (OUTPATIENT)
Dept: URGENT CARE | Facility: CLINIC | Age: 83
End: 2023-08-07
Payer: OTHER GOVERNMENT

## 2023-08-07 VITALS
TEMPERATURE: 97.3 F | BODY MASS INDEX: 25.78 KG/M2 | SYSTOLIC BLOOD PRESSURE: 152 MMHG | WEIGHT: 132 LBS | RESPIRATION RATE: 18 BRPM | HEART RATE: 70 BPM | OXYGEN SATURATION: 97 % | DIASTOLIC BLOOD PRESSURE: 71 MMHG

## 2023-08-07 DIAGNOSIS — R10.12 LEFT UPPER QUADRANT ABDOMINAL PAIN: Primary | ICD-10-CM

## 2023-08-07 PROCEDURE — 99213 OFFICE O/P EST LOW 20 MIN: CPT

## 2023-08-07 RX ORDER — OMEPRAZOLE 40 MG/1
40 CAPSULE, DELAYED RELEASE ORAL
Qty: 28 CAPSULE | Refills: 0 | Status: ON HOLD | OUTPATIENT
Start: 2023-08-07 | End: 2023-08-09 | Stop reason: ALTCHOICE

## 2023-08-07 NOTE — PROGRESS NOTES
[facility]        NAME: Kit Infante is a 79 y.o. female  : 1940    MRN: [id number]  DATE: 2023  TIME: 6:19 PM    Assessment and Plan   Left upper quadrant abdominal pain [R10.12]  1. Left upper quadrant abdominal pain  omeprazole (PriLOSEC) 40 MG capsule    ECG 12 lead        Advise ED for symptoms. Pt declines. States will go tomorrow for testing. ECG NSR no changes when compared to previous rate 71  Patient Instructions     Please proceed to the ED for evaluation. Recommend evaluation for anemia and other underlying conditions. If symptoms worsen call 911. Follow up with PCP in 3-5 days. Proceed to  ER if symptoms worsen. Chief Complaint     Chief Complaint   Patient presents with   • Chest Pain   • Abdominal Pain         History of Present Illness       Patient is an 79year old female who presents to the office today for left upper abdominal pain that shoots in to her left chest that has been everyday for 2 months. Does have known hiatal hernia. Complains of abdominal swelling, decreased appetite eating makes pain worse. Also complains of fatigue and nausea. Doctor changed her GERD medication recently and this one doesn't work. Also has hx of needing iron infusions and she is concerned for anemia. Pt gets fatigued with ambulation. Is requesting blood work. Review of Systems   Review of Systems   Constitutional: Positive for appetite change and fatigue. Respiratory: Negative for shortness of breath and wheezing. Cardiovascular: Positive for chest pain. Negative for palpitations. Gastrointestinal: Positive for abdominal pain. Neurological: Positive for weakness. All other systems reviewed and are negative.         Current Medications       Current Outpatient Medications:   •  omeprazole (PriLOSEC) 40 MG capsule, Take 1 capsule (40 mg total) by mouth 2 (two) times a day before meals for 14 days, Disp: 28 capsule, Rfl: 0  •  acetaminophen (TYLENOL) 500 mg tablet, Take 500 mg by mouth every 6 (six) hours as needed for mild pain, Disp: , Rfl:   •  atorvastatin (LIPITOR) 20 mg tablet, Take 20 mg by mouth daily, Disp: , Rfl:   •  baclofen 10 mg tablet, Take 10 mg by mouth 3 (three) times a day, Disp: , Rfl:   •  EPINEPHrine (EPIPEN) 0.3 mg/0.3 mL SOAJ, Inject 0.3 mL (0.3 mg total) into a muscle once for 1 dose, Disp: 0.6 mL, Rfl: 0  •  ferrous sulfate 325 (65 Fe) mg tablet, Take by mouth daily with breakfast, Disp: , Rfl:   •  HYDROcodone-acetaminophen (NORCO) 5-325 mg per tablet, Take 1 tablet by mouth 3 (three) times a day, Disp: , Rfl:   •  insulin aspart (NovoLOG) 100 units/mL injection, Inject under the skin, Disp: , Rfl:   •  levothyroxine 75 mcg tablet, Take 75 mcg by mouth daily, Disp: , Rfl:   •  metFORMIN (GLUCOPHAGE) 500 mg tablet, Take 500 mg by mouth 2 (two) times a day with meals, Disp: , Rfl:   •  methocarbamol (ROBAXIN) 500 mg tablet, Take 1 tablet (500 mg total) by mouth 2 (two) times a day as needed for muscle spasms, Disp: 20 tablet, Rfl: 0  •  mirtazapine (REMERON) 15 mg tablet, Take 1 tablet (15 mg total) by mouth daily at bedtime, Disp: 30 tablet, Rfl: 0  •  nitroglycerin (NITROSTAT) 0.4 mg SL tablet, Place 0.4 mg under the tongue every 5 (five) minutes as needed for chest pain, Disp: , Rfl:   •  ondansetron (ZOFRAN-ODT) 4 mg disintegrating tablet, Take 4 mg by mouth every 6 (six) hours as needed for nausea or vomiting, Disp: , Rfl:   •  pantoprazole (PROTONIX) 40 mg tablet, Take 40 mg by mouth daily, Disp: , Rfl:     Current Allergies     Allergies as of 08/07/2023 - Reviewed 08/07/2023   Allergen Reaction Noted   • Penicillins Other (See Comments), Shortness Of Breath, and Throat Swelling 10/03/2019   • Dye [iodinated contrast media] Nausea Only 02/14/2021   • Propoxyphene Nausea Only 03/15/2022   • Latex Rash 04/16/2021            The following portions of the patient's history were reviewed and updated as appropriate: allergies, current medications, past family history, past medical history, past social history, past surgical history and problem list.     Past Medical History:   Diagnosis Date   • Arthritis    • Breast cancer ([address])    • Cancer ([address])    • Diabetes mellitus ([address])    • Hiatal hernia    • Hypertension        Past Surgical History:   Procedure Laterality Date   • APPENDECTOMY     • CHOLECYSTECTOMY     • TONSILLECTOMY         History reviewed. No pertinent family history. Medications have been verified. Objective   /71   Pulse 70   Temp (!) 97.3 °F (36.3 °C)   Resp 18   Wt 59.9 kg (132 lb)   SpO2 97%   BMI 25.78 kg/m²        Physical Exam     Physical Exam  Vitals and nursing note reviewed. Constitutional:       General: She is not in acute distress. Appearance: She is well-developed and normal weight. She is not ill-appearing or toxic-appearing. Cardiovascular:      Rate and Rhythm: Normal rate and regular rhythm. Heart sounds: Normal heart sounds. Pulmonary:      Effort: Pulmonary effort is normal.      Breath sounds: Normal breath sounds. Abdominal:      General: Bowel sounds are normal.      Tenderness: There is abdominal tenderness in the right upper quadrant, epigastric area and left upper quadrant. Skin:     General: Skin is warm. Capillary Refill: Capillary refill takes less than 2 seconds. Neurological:      Mental Status: She is alert.

## 2023-08-07 NOTE — PATIENT INSTRUCTIONS
Please proceed to the ED for evaluation. Recommend evaluation for anemia and other underlying conditions. If symptoms worsen call 911.

## 2023-08-08 ENCOUNTER — HOSPITAL ENCOUNTER (INPATIENT)
Facility: HOSPITAL | Age: 83
LOS: 2 days | Discharge: HOME | DRG: 384 | End: 2023-08-11
Attending: EMERGENCY MEDICINE | Admitting: FAMILY MEDICINE
Payer: COMMERCIAL

## 2023-08-08 ENCOUNTER — APPOINTMENT (EMERGENCY)
Dept: CT IMAGING | Facility: HOSPITAL | Age: 83
DRG: 384 | End: 2023-08-08
Payer: COMMERCIAL

## 2023-08-08 DIAGNOSIS — K44.9 HIATAL HERNIA WITH GERD WITHOUT ESOPHAGITIS: ICD-10-CM

## 2023-08-08 DIAGNOSIS — R10.13 EPIGASTRIC PAIN: ICD-10-CM

## 2023-08-08 DIAGNOSIS — D64.9 SYMPTOMATIC ANEMIA: Primary | ICD-10-CM

## 2023-08-08 DIAGNOSIS — Z86.19 HISTORY OF HELICOBACTER PYLORI INFECTION: ICD-10-CM

## 2023-08-08 DIAGNOSIS — D64.9 SYMPTOMATIC ANEMIA: ICD-10-CM

## 2023-08-08 DIAGNOSIS — R10.9 ABDOMINAL PAIN: ICD-10-CM

## 2023-08-08 DIAGNOSIS — R07.9 CHEST PAIN: ICD-10-CM

## 2023-08-08 DIAGNOSIS — K29.70 GASTRITIS: ICD-10-CM

## 2023-08-08 DIAGNOSIS — K21.9 HIATAL HERNIA WITH GERD WITHOUT ESOPHAGITIS: ICD-10-CM

## 2023-08-08 LAB
ABO GROUP BLD: NORMAL
ALBUMIN SERPL BCP-MCNC: 3.7 G/DL (ref 3.5–5)
ALP SERPL-CCNC: 49 U/L (ref 34–104)
ALT SERPL W P-5'-P-CCNC: 8 U/L (ref 7–52)
ANION GAP SERPL CALCULATED.3IONS-SCNC: 7 MMOL/L
APTT PPP: 32 SECONDS (ref 23–37)
AST SERPL W P-5'-P-CCNC: 13 U/L (ref 13–39)
BACTERIA UR QL AUTO: NORMAL /HPF
BASOPHILS # BLD AUTO: 0.02 THOUSANDS/ÂΜL (ref 0–0.1)
BASOPHILS NFR BLD AUTO: 0 % (ref 0–1)
BILIRUB DIRECT SERPL-MCNC: 0.04 MG/DL (ref 0–0.2)
BILIRUB SERPL-MCNC: 0.26 MG/DL (ref 0.2–1)
BILIRUB UR QL STRIP: NEGATIVE
BLD GP AB SCN SERPL QL: NEGATIVE
BUN SERPL-MCNC: 10 MG/DL (ref 5–25)
CALCIUM SERPL-MCNC: 8.6 MG/DL (ref 8.4–10.2)
CARDIAC TROPONIN I PNL SERPL HS: 7 NG/L (ref 8–18)
CHLORIDE SERPL-SCNC: 104 MMOL/L (ref 96–108)
CLARITY UR: CLEAR
CO2 SERPL-SCNC: 28 MMOL/L (ref 21–32)
COLOR UR: ABNORMAL
CREAT SERPL-MCNC: 1.09 MG/DL (ref 0.6–1.3)
D DIMER PPP FEU-MCNC: 1.2 UG/ML FEU
EOSINOPHIL # BLD AUTO: 0.07 THOUSAND/ÂΜL (ref 0–0.61)
EOSINOPHIL NFR BLD AUTO: 1 % (ref 0–6)
ERYTHROCYTE [DISTWIDTH] IN BLOOD BY AUTOMATED COUNT: 18 % (ref 11.6–15.1)
FLUAV RNA RESP QL NAA+PROBE: NEGATIVE
FLUBV RNA RESP QL NAA+PROBE: NEGATIVE
GFR SERPL CREATININE-BSD FRML MDRD: 46 ML/MIN/1.73SQ M
GLUCOSE SERPL-MCNC: 128 MG/DL (ref 65–140)
GLUCOSE UR STRIP-MCNC: NEGATIVE MG/DL
HCT VFR BLD AUTO: 25 % (ref 34.8–46.1)
HGB BLD-MCNC: 6.9 G/DL (ref 11.5–15.4)
HGB UR QL STRIP.AUTO: NEGATIVE
IMM GRANULOCYTES # BLD AUTO: 0.05 THOUSAND/UL (ref 0–0.2)
IMM GRANULOCYTES NFR BLD AUTO: 1 % (ref 0–2)
INR PPP: 1.07 (ref 0.84–1.19)
KETONES UR STRIP-MCNC: NEGATIVE MG/DL
LACTATE SERPL-SCNC: 0.9 MMOL/L (ref 0.5–2)
LEUKOCYTE ESTERASE UR QL STRIP: ABNORMAL
LIPASE SERPL-CCNC: 47 U/L (ref 11–82)
LYMPHOCYTES # BLD AUTO: 1.28 THOUSANDS/ÂΜL (ref 0.6–4.47)
LYMPHOCYTES NFR BLD AUTO: 26 % (ref 14–44)
MAGNESIUM SERPL-MCNC: 2 MG/DL (ref 1.9–2.7)
MCH RBC QN AUTO: 23 PG (ref 26.8–34.3)
MCHC RBC AUTO-ENTMCNC: 27.6 G/DL (ref 31.4–37.4)
MCV RBC AUTO: 83 FL (ref 82–98)
MONOCYTES # BLD AUTO: 0.41 THOUSAND/ÂΜL (ref 0.17–1.22)
MONOCYTES NFR BLD AUTO: 8 % (ref 4–12)
NEUTROPHILS # BLD AUTO: 3.19 THOUSANDS/ÂΜL (ref 1.85–7.62)
NEUTS SEG NFR BLD AUTO: 64 % (ref 43–75)
NITRITE UR QL STRIP: NEGATIVE
NON-SQ EPI CELLS URNS QL MICRO: NORMAL /HPF
NRBC BLD AUTO-RTO: 0 /100 WBCS
OTHER STN SPEC: NORMAL
PH UR STRIP.AUTO: 7 [PH]
PLATELET # BLD AUTO: 349 THOUSANDS/UL (ref 149–390)
PMV BLD AUTO: 9.6 FL (ref 8.9–12.7)
POTASSIUM SERPL-SCNC: 4.1 MMOL/L (ref 3.5–5.3)
PROT SERPL-MCNC: 6.2 G/DL (ref 6.4–8.4)
PROT UR STRIP-MCNC: NEGATIVE MG/DL
PROTHROMBIN TIME: 14.1 SECONDS (ref 11.6–14.5)
RBC # BLD AUTO: 3 MILLION/UL (ref 3.81–5.12)
RBC #/AREA URNS AUTO: NORMAL /HPF
RH BLD: POSITIVE
RSV RNA RESP QL NAA+PROBE: NEGATIVE
SARS-COV-2 RNA RESP QL NAA+PROBE: NEGATIVE
SODIUM SERPL-SCNC: 139 MMOL/L (ref 135–147)
SP GR UR STRIP.AUTO: 1.01 (ref 1–1.03)
SPECIMEN EXPIRATION DATE: NORMAL
UROBILINOGEN UR QL STRIP.AUTO: 0.2 E.U./DL
WBC # BLD AUTO: 5.02 THOUSAND/UL (ref 4.31–10.16)
WBC #/AREA URNS AUTO: NORMAL /HPF

## 2023-08-08 PROCEDURE — P9016 RBC LEUKOCYTES REDUCED: HCPCS

## 2023-08-08 PROCEDURE — 99291 CRITICAL CARE FIRST HOUR: CPT | Performed by: EMERGENCY MEDICINE

## 2023-08-08 PROCEDURE — 81001 URINALYSIS AUTO W/SCOPE: CPT | Performed by: EMERGENCY MEDICINE

## 2023-08-08 PROCEDURE — 86900 BLOOD TYPING SEROLOGIC ABO: CPT | Performed by: EMERGENCY MEDICINE

## 2023-08-08 PROCEDURE — 85025 COMPLETE CBC W/AUTO DIFF WBC: CPT | Performed by: EMERGENCY MEDICINE

## 2023-08-08 PROCEDURE — 96374 THER/PROPH/DIAG INJ IV PUSH: CPT

## 2023-08-08 PROCEDURE — 86901 BLOOD TYPING SEROLOGIC RH(D): CPT | Performed by: EMERGENCY MEDICINE

## 2023-08-08 PROCEDURE — 36415 COLL VENOUS BLD VENIPUNCTURE: CPT | Performed by: EMERGENCY MEDICINE

## 2023-08-08 PROCEDURE — 86850 RBC ANTIBODY SCREEN: CPT | Performed by: EMERGENCY MEDICINE

## 2023-08-08 PROCEDURE — 84484 ASSAY OF TROPONIN QUANT: CPT | Performed by: EMERGENCY MEDICINE

## 2023-08-08 PROCEDURE — 80076 HEPATIC FUNCTION PANEL: CPT | Performed by: EMERGENCY MEDICINE

## 2023-08-08 PROCEDURE — 85610 PROTHROMBIN TIME: CPT | Performed by: EMERGENCY MEDICINE

## 2023-08-08 PROCEDURE — 83735 ASSAY OF MAGNESIUM: CPT | Performed by: EMERGENCY MEDICINE

## 2023-08-08 PROCEDURE — G1004 CDSM NDSC: HCPCS

## 2023-08-08 PROCEDURE — 85379 FIBRIN DEGRADATION QUANT: CPT | Performed by: EMERGENCY MEDICINE

## 2023-08-08 PROCEDURE — 80048 BASIC METABOLIC PNL TOTAL CA: CPT | Performed by: EMERGENCY MEDICINE

## 2023-08-08 PROCEDURE — 93005 ELECTROCARDIOGRAM TRACING: CPT

## 2023-08-08 PROCEDURE — 74177 CT ABD & PELVIS W/CONTRAST: CPT

## 2023-08-08 PROCEDURE — 85730 THROMBOPLASTIN TIME PARTIAL: CPT | Performed by: EMERGENCY MEDICINE

## 2023-08-08 PROCEDURE — 0241U HB NFCT DS VIR RESP RNA 4 TRGT: CPT | Performed by: EMERGENCY MEDICINE

## 2023-08-08 PROCEDURE — 99285 EMERGENCY DEPT VISIT HI MDM: CPT

## 2023-08-08 PROCEDURE — 70450 CT HEAD/BRAIN W/O DYE: CPT

## 2023-08-08 PROCEDURE — 86923 COMPATIBILITY TEST ELECTRIC: CPT

## 2023-08-08 PROCEDURE — 83605 ASSAY OF LACTIC ACID: CPT | Performed by: EMERGENCY MEDICINE

## 2023-08-08 PROCEDURE — 36430 TRANSFUSION BLD/BLD COMPNT: CPT

## 2023-08-08 PROCEDURE — 83690 ASSAY OF LIPASE: CPT | Performed by: EMERGENCY MEDICINE

## 2023-08-08 PROCEDURE — 72125 CT NECK SPINE W/O DYE: CPT

## 2023-08-08 PROCEDURE — 96376 TX/PRO/DX INJ SAME DRUG ADON: CPT

## 2023-08-08 PROCEDURE — 71275 CT ANGIOGRAPHY CHEST: CPT

## 2023-08-08 RX ORDER — ONDANSETRON 2 MG/ML
4 INJECTION INTRAMUSCULAR; INTRAVENOUS ONCE
Status: COMPLETED | OUTPATIENT
Start: 2023-08-08 | End: 2023-08-08

## 2023-08-08 RX ADMIN — ONDANSETRON 4 MG: 2 INJECTION INTRAMUSCULAR; INTRAVENOUS at 19:38

## 2023-08-08 RX ADMIN — IOHEXOL 100 ML: 350 INJECTION, SOLUTION INTRAVENOUS at 21:44

## 2023-08-08 RX ADMIN — ONDANSETRON 4 MG: 2 INJECTION INTRAMUSCULAR; INTRAVENOUS at 21:21

## 2023-08-08 NOTE — ED PROVIDER NOTES
History  Chief Complaint   Patient presents with   • Chest Pain     L sided chest pain radiating into neck/jaw/back "for about a month"; states she was seen at urgent care yesterday and was told to come here but did not want to come     80 yo female with history of GERD hiatal hernia type 2 diabetes hypertension breast cancer arthritis and coronary artery disease presents with approximately 2-month history of daily left upper quadrant abdominal pain which radiates to the left chest and left arm. She was evaluated in urgent care yesterday for the symptoms and it was recommended that she present to the emergency department she presents by ambulance today for further evaluation. She e is unable to characterize the pain. Describes it as severe currently on daily basis. It lasts 1 to 2 hours when it is there and will radiate to the back. This is accompanied by nausea no vomiting no changes to the stools she denies any melena or hematochezia she does complain of dysuria and states that occasionally it burns her gut when she has a bowel movement but states that her bowel movements are normal.  Appetite has been diminished denies vomiting she does complain of shortness of breath movement walking will worsen the symptoms patient describes a fall 2 days ago she got up off the chair lost her balance and fell on the floor she denies hitting her head she is not otherwise anticoagulated her pain did not change in any way as a result of the fall. She is complains of numbness to her feet which is chronic currently denies a headache she does complain of chest pain she feels weak all over. Past surgical history cholecystectomy appendectomy tonsillectomy and remote history of abdominal surgery          Prior to Admission Medications   Prescriptions Last Dose Informant Patient Reported? Taking?    Diclofenac Sodium (VOLTAREN) 1 % 8/8/2023  Yes Yes   Sig: apply to affected area four times a day   EPINEPHrine (EPIPEN) 0.3 mg/0.3 mL SOAJ   No No   Sig: Inject 0.3 mL (0.3 mg total) into a muscle once for 1 dose   HYDROcodone-acetaminophen (NORCO) 5-325 mg per tablet 8/8/2023  Yes Yes   Sig: Take 1 tablet by mouth 3 (three) times a day as needed for pain   acetaminophen (TYLENOL) 500 mg tablet Past Month  Yes Yes   Sig: Take 500 mg by mouth every 6 (six) hours as needed for mild pain   atorvastatin (LIPITOR) 20 mg tablet 8/8/2023  Yes Yes   Sig: Take 20 mg by mouth daily   ferrous sulfate 325 (65 Fe) mg tablet 8/8/2023  Yes Yes   Sig: Take by mouth daily with breakfast   levothyroxine 75 mcg tablet 8/8/2023  Yes Yes   Sig: Take 75 mcg by mouth daily   ondansetron (ZOFRAN-ODT) 4 mg disintegrating tablet Unknown  Yes No   Sig: Take 4 mg by mouth every 6 (six) hours as needed for nausea or vomiting   pantoprazole (PROTONIX) 40 mg tablet 8/8/2023  Yes Yes   Sig: Take 40 mg by mouth daily   sucralfate (CARAFATE) 1 g/10 mL suspension 8/8/2023  Yes Yes   Sig: Take 10 mL by mouth 2 (two) times a day   vitamin B-12 (VITAMIN B-12) 1,000 mcg tablet 8/8/2023  Yes Yes   Sig: Take 1,000 mcg by mouth every morning      Facility-Administered Medications: None       Past Medical History:   Diagnosis Date   • Acquired renal cyst of left kidney 8/9/2023   • Arthritis    • Breast cancer ([facility])    • Cancer ([facility])    • Diabetes mellitus ([facility])    • Generalized osteoarthritis 8/9/2023   • Hiatal hernia    • Hiatal hernia with GERD without esophagitis 4/16/2021   • History of Helicobacter pylori infection 8/9/2023   • Hypertension    • Stage 3b chronic kidney disease ([address]) 8/9/2023   • Type 2 diabetes mellitus with stage 3b chronic kidney disease, without long-term current use of insulin ([facility]) 6/30/2021       Past Surgical History:   Procedure Laterality Date   • ABDOMINAL SURGERY      states in maybe 40s-50s; "my intestines were wrapped around each other"   • APPENDECTOMY     • CHOLECYSTECTOMY     • TONSILLECTOMY         History reviewed. No pertinent family history.   I have reviewed and agree with the history as documented. E-Cigarette/Vaping   • E-Cigarette Use Never User      E-Cigarette/Vaping Substances   • Nicotine No    • THC No    • CBD No      Social History     Tobacco Use   • Smoking status: Never   • Smokeless tobacco: Never   Vaping Use   • Vaping Use: Never used   Substance Use Topics   • Alcohol use: Never   • Drug use: Never       Review of Systems   Constitutional: Positive for activity change and appetite change. Negative for chills, fatigue and fever. HENT: Negative for congestion, ear pain, rhinorrhea, sneezing and sore throat. Eyes: Negative for discharge. Respiratory: Positive for shortness of breath. Negative for cough. Cardiovascular: Positive for chest pain. Negative for leg swelling. Gastrointestinal: Positive for abdominal distention, abdominal pain and nausea. Negative for blood in stool, diarrhea and vomiting. Endocrine: Negative for polyuria. Genitourinary: Positive for dysuria. Negative for difficulty urinating, frequency and urgency. Musculoskeletal: Positive for back pain. Negative for myalgias. Skin: Negative for rash. Neurological: Positive for weakness (generalized). Negative for dizziness, light-headedness, numbness and headaches. Hematological: Negative for adenopathy. Psychiatric/Behavioral: Negative for confusion. All other systems reviewed and are negative. Physical Exam  Physical Exam  Vitals and nursing note reviewed. Constitutional:       General: She is not in acute distress. Appearance: She is well-developed. She is not ill-appearing, toxic-appearing or diaphoretic. HENT:      Head: Normocephalic and atraumatic. Right Ear: Tympanic membrane and external ear normal.      Left Ear: Tympanic membrane and external ear normal.      Nose: Nose normal. No congestion or rhinorrhea.       Mouth/Throat:      Mouth: Mucous membranes are moist.      Pharynx: No oropharyngeal exudate or posterior oropharyngeal erythema. Eyes:      General:         Right eye: No discharge. Left eye: No discharge. Extraocular Movements: Extraocular movements intact. Conjunctiva/sclera: Conjunctivae normal.      Pupils: Pupils are equal, round, and reactive to light. Neck:      Comments: No midline or paraspinous tenderness  Cardiovascular:      Rate and Rhythm: Normal rate and regular rhythm. Pulses: Normal pulses. Heart sounds: Normal heart sounds. Pulmonary:      Effort: Pulmonary effort is normal. No respiratory distress. Breath sounds: Normal breath sounds. No stridor. No wheezing or rales. Chest:      Chest wall: No tenderness. Abdominal:      General: Bowel sounds are normal. There is no distension. Palpations: Abdomen is soft. Tenderness: There is abdominal tenderness (epigastric LUQ). There is guarding. There is no right CVA tenderness, left CVA tenderness or rebound. Comments: Back no midline T or L spine tenderness   Genitourinary:     Comments: Refused rectal exam  Musculoskeletal:         General: No tenderness or deformity. Normal range of motion. Cervical back: Normal range of motion and neck supple. No rigidity or tenderness. Right lower leg: No edema. Left lower leg: No edema. Comments: 2+ AT pulses   Skin:     General: Skin is warm and dry. Capillary Refill: Capillary refill takes less than 2 seconds. Neurological:      Mental Status: She is alert and oriented to person, place, and time. Cranial Nerves: No cranial nerve deficit. Sensory: No sensory deficit. Motor: No weakness or abnormal muscle tone.       Coordination: Coordination normal.      Comments: Gait steady   Psychiatric:         Mood and Affect: Mood normal.         Vital Signs  ED Triage Vitals   Temperature Pulse Respirations Blood Pressure SpO2   08/08/23 1933 08/08/23 1815 08/08/23 1815 08/08/23 1815 08/08/23 1815   98.2 °F (36.8 °C) 82 16 120/68 96 %      Temp Source Heart Rate Source Patient Position - Orthostatic VS BP Location FiO2 (%)   08/08/23 1933 08/08/23 1815 08/09/23 0000 08/09/23 0000 --   Temporal Monitor Lying Left arm       Pain Score       08/08/23 1815       5           Vitals:    08/09/23 0055 08/09/23 0120 08/09/23 0211 08/09/23 0707   BP: 157/78 156/78 139/67 139/67   Pulse: 69 80 66 65   Patient Position - Orthostatic VS:   Lying Lying         Visual Acuity      ED Medications  Medications   insulin lispro (HumaLOG) 100 units/mL subcutaneous injection 1-5 Units ( Subcutaneous Not Given 8/9/23 1152)   HYDROcodone-acetaminophen (NORCO) 5-325 mg per tablet 1 tablet (1 tablet Oral Given 8/9/23 0526)   atorvastatin (LIPITOR) tablet 20 mg (20 mg Oral Given 8/9/23 0812)   ferrous sulfate tablet 325 mg (325 mg Oral Given 8/9/23 0811)   levothyroxine tablet 75 mcg (75 mcg Oral Given 8/9/23 0523)   pantoprazole (PROTONIX) injection 40 mg (40 mg Intravenous Given 8/9/23 1024)   sucralfate (CARAFATE) oral suspension (VANNESA/PEDS) 1 g (0 g Oral Hold 8/9/23 1025)   cyanocobalamin (VITAMIN B-12) tablet 1,000 mcg (1,000 mcg Oral Given 8/9/23 0812)   Diclofenac Sodium (VOLTAREN) 1 % topical gel 2 g (has no administration in time range)   acetaminophen (TYLENOL) tablet 650 mg (has no administration in time range)   ondansetron (ZOFRAN) injection 4 mg (has no administration in time range)   ondansetron (ZOFRAN) injection 4 mg (4 mg Intravenous Given 8/8/23 1938)   ondansetron (ZOFRAN) injection 4 mg (4 mg Intravenous Given 8/8/23 2121)   iohexol (OMNIPAQUE) 350 MG/ML injection (SINGLE-DOSE) 100 mL (100 mL Intravenous Given 8/8/23 2144)   morphine injection 2 mg (2 mg Intravenous Given 8/9/23 0010)       Diagnostic Studies  Results Reviewed     Procedure Component Value Units Date/Time    Iron Saturation % [[id number]] Collected: 08/08/23 1933    Lab Status:  In process Specimen: Blood Updated: 08/09/23 0140    Ferritin [[id number]] Collected: 08/08/23 1933 Lab Status: In process Specimen: Blood Updated: 08/09/23 0140    Vitamin B12 [[id number]] Collected: 08/08/23 1933    Lab Status: In process Specimen: Blood Updated: 08/09/23 0129    Folate [[id number]] Collected: 08/08/23 1933    Lab Status: In process Specimen: Blood Updated: 08/09/23 0129    Urine Microscopic [[id number]] Collected: 08/08/23 2104    Lab Status: Final result Specimen: Urine, Other Updated: 08/08/23 2149     RBC, UA 0-1 /hpf      WBC, UA 2-4 /hpf      Epithelial Cells None Seen /hpf      Bacteria, UA None Seen /hpf      OTHER OBSERVATIONS Transitional Epithelial Cells    UA w Reflex to Microscopic w Reflex to Culture [[id number]]  (Abnormal) Collected: 08/08/23 2104    Lab Status: Final result Specimen: Urine, Other Updated: 08/08/23 2119     Color, UA Light Yellow     Clarity, UA Clear     Specific Gravity, UA 1.015     pH, UA 7.0     Leukocytes, UA Trace     Nitrite, UA Negative     Protein, UA Negative mg/dl      Glucose, UA Negative mg/dl      Ketones, UA Negative mg/dl      Urobilinogen, UA 0.2 E.U./dl      Bilirubin, UA Negative     Occult Blood, UA Negative    D-Dimer [[id number]]  (Abnormal) Collected: 08/08/23 1933    Lab Status: Final result Specimen: Blood from Arm, Left Updated: 08/08/23 2047     D-Dimer, Quant 1.20 ug/ml FEU     Narrative: In the evaluation for possible pulmonary embolism, in the appropriate (Well's Score of 4 or less) patient, the age adjusted d-dimer cutoff for this patient can be calculated as:    Age x 0.01 (in ug/mL) for Age-adjusted D-dimer exclusion threshold for a patient over 50 years.     FLU/RSV/COVID - if FLU/RSV clinically relevant [[id number]]  (Normal) Collected: 08/08/23 1933    Lab Status: Final result Specimen: Nares from Nose Updated: 08/08/23 2034     SARS-CoV-2 Negative     INFLUENZA A PCR Negative     INFLUENZA B PCR Negative     RSV PCR Negative    Narrative:      FOR PEDIATRIC PATIENTS - copy/paste COVID Guidelines URL to browser: [email or web address]    SARS-CoV-2 assay is a Nucleic Acid Amplification assay intended for the  qualitative detection of nucleic acid from SARS-CoV-2 in nasopharyngeal  swabs. Results are for the presumptive identification of SARS-CoV-2 RNA. Positive results are indicative of infection with SARS-CoV-2, the virus  causing COVID-19, but do not rule out bacterial infection or co-infection  with other viruses. Laboratories within the [facility] and its  territories are required to report all positive results to the appropriate  public health authorities. Negative results do not preclude SARS-CoV-2  infection and should not be used as the sole basis for treatment or other  patient management decisions. Negative results must be combined with  clinical observations, patient history, and epidemiological information. This test has not been FDA cleared or approved. This test has been authorized by FDA under an Emergency Use Authorization  (EUA). This test is only authorized for the duration of time the  declaration that circumstances exist justifying the authorization of the  emergency use of an in vitro diagnostic tests for detection of SARS-CoV-2  virus and/or diagnosis of COVID-19 infection under section 564(b)(1) of  the Act, 21 U. S.C. 108TAY-5(O)(1), unless the authorization is terminated  or revoked sooner. The test has been validated but independent review by FDA  and CLIA is pending. Test performed using [employer] GeneXpert: This RT-PCR assay targets N2,  a region unique to SARS-CoV-2. A conserved region in the E-gene was chosen  for pan-Sarbecovirus detection which includes SARS-CoV-2. According to CMS-2020-01-R, this platform meets the definition of high-throughput technology.     Protime-INR [[id number]]  (Normal) Collected: 08/08/23 1933    Lab Status: Final result Specimen: Blood from Arm, Left Updated: 08/08/23 2031     Protime 14.1 seconds INR 1.07    APTT [[id number]]  (Normal) Collected: 08/08/23 1933    Lab Status: Final result Specimen: Blood from Arm, Left Updated: 08/08/23 2031     PTT 32 seconds     High Sensitivity Troponin I Random [[id number]]  (Abnormal) Collected: 08/08/23 1933    Lab Status: Final result Specimen: Blood from Arm, Left Updated: 08/08/23 2020     HS TnI random 7 ng/L     CBC and differential [[id number]]  (Abnormal) Collected: 08/08/23 1933    Lab Status: Final result Specimen: Blood from Arm, Left Updated: 08/08/23 2019     WBC 5.02 Thousand/uL      RBC 3.00 Million/uL      Hemoglobin 6.9 g/dL      Hematocrit 25.0 %      MCV 83 fL      MCH 23.0 pg      MCHC 27.6 g/dL      RDW 18.0 %      MPV 9.6 fL      Platelets 232 Thousands/uL      nRBC 0 /100 WBCs      Neutrophils Relative 64 %      Immat GRANS % 1 %      Lymphocytes Relative 26 %      Monocytes Relative 8 %      Eosinophils Relative 1 %      Basophils Relative 0 %      Neutrophils Absolute 3.19 Thousands/µL      Immature Grans Absolute 0.05 Thousand/uL      Lymphocytes Absolute 1.28 Thousands/µL      Monocytes Absolute 0.41 Thousand/µL      Eosinophils Absolute 0.07 Thousand/µL      Basophils Absolute 0.02 Thousands/µL     Narrative: This is an appended report. These results have been appended to a previously verified report. Lactic acid, plasma (w/reflex if result > 2.0) [[id number]]  (Normal) Collected: 08/08/23 1933    Lab Status: Final result Specimen: Blood from Arm, Left Updated: 08/08/23 2014     LACTIC ACID 0.9 mmol/L     Narrative:      Result may be elevated if tourniquet was used during collection.     Basic metabolic panel [[id number]] Collected: 08/08/23 1933    Lab Status: Final result Specimen: Blood from Arm, Left Updated: 08/08/23 2014     Sodium 139 mmol/L      Potassium 4.1 mmol/L      Chloride 104 mmol/L      CO2 28 mmol/L      ANION GAP 7 mmol/L      BUN 10 mg/dL      Creatinine 1.09 mg/dL      Glucose 128 mg/dL      Calcium 8.6 mg/dL      eGFR 55 ml/min/1.73sq m     Narrative:      [facility] guidelines for Chronic Kidney Disease (CKD):   •  Stage 1 with normal or high GFR (GFR > 90 mL/min/1.73 square meters)  •  Stage 2 Mild CKD (GFR = 60-89 mL/min/1.73 square meters)  •  Stage 3A Moderate CKD (GFR = 45-59 mL/min/1.73 square meters)  •  Stage 3B Moderate CKD (GFR = 30-44 mL/min/1.73 square meters)  •  Stage 4 Severe CKD (GFR = 15-29 mL/min/1.73 square meters)  •  Stage 5 End Stage CKD (GFR <15 mL/min/1.73 square meters)  Note: GFR calculation is accurate only with a steady state creatinine    Hepatic function panel [[id number]]  (Abnormal) Collected: 08/08/23 1933    Lab Status: Final result Specimen: Blood from Arm, Left Updated: 08/08/23 2014     Total Bilirubin 0.26 mg/dL      Bilirubin, Direct 0.04 mg/dL      Alkaline Phosphatase 49 U/L      AST 13 U/L      ALT 8 U/L      Total Protein 6.2 g/dL      Albumin 3.7 g/dL     Magnesium [[id number]]  (Normal) Collected: 08/08/23 1933    Lab Status: Final result Specimen: Blood from Arm, Left Updated: 08/08/23 2014     Magnesium 2.0 mg/dL     Lipase [[id number]]  (Normal) Collected: 08/08/23 1933    Lab Status: Final result Specimen: Blood from Arm, Left Updated: 08/08/23 2014     Lipase 47 u/L                  CT head without contrast   Final Result by Reba Gardner MD (08/08 2226)      No acute intracranial abnormality. Workstation performed: [id number]         CT cervical spine without contrast   Final Result by Reba Gardner MD (08/08 2230)      No cervical spine fracture or traumatic malalignment. Workstation performed: [id number]         PE Study with CT Abdomen and Pelvis with contrast   Final Result by Diaan Guerra MD (08/08 2357)      1. No evidence of acute pulmonary embolus, thoracic aortic aneurysm or dissection. No acute cardiopulmonary process. 2. No acute intra-abdominal or pelvic process.                      Workstation performed: [id number]                    Procedures  ECG 12 Lead Documentation Only    Date/Time: 8/8/2023 6:20 PM    Performed by: Sarah Ortega MD  Authorized by: Sarah Ortega MD    Indications / Diagnosis:  Cp  ECG reviewed by me, the ED Provider: yes    Patient location:  ED  Previous ECG:     Previous ECG:  Unavailable (unable to view on MUSE)  Rate:     ECG rate:  81    ECG rate assessment: normal    Rhythm:     Rhythm: sinus rhythm    QRS:     QRS axis:  Normal  Comments:       no acute ischemic changes    CriticalCare Time    Date/Time: 8/9/2023 3:19 PM    Performed by: Sarah Ortega MD  Authorized by: Sarah Ortega MD    Critical care provider statement:     Critical care time (minutes):  45    Critical care time was exclusive of:  Separately billable procedures and treating other patients    Critical care was necessary to treat or prevent imminent or life-threatening deterioration of the following conditions:  Circulatory failure    Critical care was time spent personally by me on the following activities:  Obtaining history from patient or surrogate, development of treatment plan with patient or surrogate, discussions with consultants, evaluation of patient's response to treatment, examination of patient, ordering and performing treatments and interventions, ordering and review of laboratory studies, ordering and review of radiographic studies, re-evaluation of patient's condition and review of old charts    I assumed direction of critical care for this patient from another provider in my specialty: no               ED Course  ED Course as of 08/09/23 1519   Tue Aug 08, 2023   2116 Blood consent on the chart   2230 Reviewed labs with patient and daughter proceeded with CTA chest ad a/p as age adjusted d-dimer is positive   2313 Case discussed with Dr. Johny Orozco of [facility] with pending CTA chest  a/p Dr. Sridhar Iniguez will update Dr. Johny Orozco with result   [phone] Patient updated with plan for hospitalization c/o diffuse pain will medicate with morhpine 2mg IV             HEART Risk Score    Flowsheet Row Most Recent Value   Heart Score Risk Calculator    History 1 Filed at: 08/08/2023 2105   ECG 1 Filed at: 08/08/2023 2105   Age 2 Filed at: 08/08/2023 2105   Risk Factors 2 Filed at: 08/08/2023 2105   Troponin 0 Filed at: 08/08/2023 2105   HEART Score 6 Filed at: 08/08/2023 2105                        SBIRT 20yo+    Flowsheet Row Most Recent Value   Initial Alcohol Screen: US AUDIT-C     1. How often do you have a drink containing alcohol? 0 Filed at: 08/08/2023 1820   2. How many drinks containing alcohol do you have on a typical day you are drinking? 0 Filed at: 08/08/2023 1820   3b. FEMALE Any Age, or MALE 65+: How often do you have 4 or more drinks on one occassion? 0 Filed at: 08/08/2023 1820   Audit-C Score 0 Filed at: 08/08/2023 1820   JOSE: How many times in the past year have you. .. Used an illegal drug or used a prescription medication for non-medical reasons? Never Filed at: 08/08/2023 1820          Wells' Criteria for PE    Flowsheet Row Most Recent Value   Wells' Criteria for PE    Clinical signs and symptoms of DVT 0 Filed at: 08/08/2023 2106   PE is primary diagnosis or equally likely 3 Filed at: 08/08/2023 2106   HR >100 0 Filed at: 08/08/2023 2106   Immobilization at least 3 days or Surgery in the previous 4 weeks 0 Filed at: 08/08/2023 2106   Previous, objectively diagnosed PE or DVT 0 Filed at: 08/08/2023 2106   Hemoptysis 0 Filed at: 08/08/2023 2106   Malignancy with treatment within 6 months or palliative 0 Filed at: 08/08/2023 2106   Wells' Criteria Total 3 Filed at: 08/08/2023 2106                Medical Decision Making  Mdm: 78 yo female with 2 month hx of chest/abdm pain with fall 2 days ago no apparent trauma on physical exam; will eval for ACS, PE pancreatitis, obstruction (less likely) anemia, electrolye abnormality.  Secondary to hx of fall and complaints of headache and neck pain will eval with CT head and neck      Urgent care notes from yesterday reveiwed  EGD findings from 7/2/21 ? The esophagus appeared normal.   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 R/o H pylori. The duodenum appeared normal.       Amount and/or Complexity of Data Reviewed  Labs: ordered. Radiology: ordered. Risk  Prescription drug management.           Disposition  Final diagnoses:   Chest pain   Abdominal pain   Symptomatic anemia - Hb 6.9     Time reflects when diagnosis was documented in both MDM as applicable and the Disposition within this note     Time User Action Codes Description Comment    8/8/2023 10:29 PM Jessica Blakes Add [R07.9] Chest pain     8/8/2023 10:30 PM Jessica Blakes Add [R10.9] Abdominal pain     8/8/2023 10:30 PM Jessica Blakes Add [D50.9] Microcytic anemia     8/8/2023 10:46 PM Jessica Blakes Remove [D50.9] Microcytic anemia     8/8/2023 10:46 PM Jessica Blakes Add [D64.9] Symptomatic anemia     8/8/2023 10:46 PM Jessica Blakes Modify [D64.9] Symptomatic anemia HB     8/8/2023 10:47 PM Jessica Blakes Modify [D64.9] Symptomatic anemia Hb 6.9    8/9/2023 12:30 AM Mckinney Horn Modify [D64.9] Symptomatic anemia     8/9/2023 12:30 AM Mckinney Horn Add [K44.9,  K21.9] Hiatal hernia with GERD without esophagitis     8/9/2023 12:30 AM Mckinney Horn Modify [D64.9] Symptomatic anemia Hb 6.9    8/9/2023 12:30 AM Mckinney Horn Modify [D64.9] Symptomatic anemia     8/9/2023 12:30 AM Mckinney Horn Add [Y03.22] History of Helicobacter pylori infection     8/9/2023 12:30 AM Mckinney Horn Modify [D64.9] Symptomatic anemia Hb 6.9    8/9/2023 12:38 AM Mckinney Horn Modify [R07.9] Chest pain     8/9/2023 12:38 AM Mckinney Horn Modify [R10.9] Abdominal pain     8/9/2023 12:38 AM Vikki Aj Modify [D64.9] Symptomatic anemia Hb 6.9    8/9/2023 12:38 AM Vikki Aj Modify [K44.9,  K21.9] Hiatal hernia with GERD without esophagitis     8/9/2023 12:38 AM iVkki Aj Modify [Y82.18] History of Helicobacter pylori infection       ED Disposition     ED Disposition   Admit    Condition   Stable    Date/Time   Tue Aug 8, 2023 11:13 PM    Comment   Case was discussed with Dr. Megan Rogers  and the patient's admission status was agreed to be Admission Status: observation status to the service of Dr. Megan Rogers . Follow-up Information    None         Current Discharge Medication List      CONTINUE these medications which have NOT CHANGED    Details   acetaminophen (TYLENOL) 500 mg tablet Take 500 mg by mouth every 6 (six) hours as needed for mild pain      atorvastatin (LIPITOR) 20 mg tablet Take 20 mg by mouth daily      Diclofenac Sodium (VOLTAREN) 1 % apply to affected area four times a day      ferrous sulfate 325 (65 Fe) mg tablet Take by mouth daily with breakfast      HYDROcodone-acetaminophen (NORCO) 5-325 mg per tablet Take 1 tablet by mouth 3 (three) times a day as needed for pain      levothyroxine 75 mcg tablet Take 75 mcg by mouth daily      pantoprazole (PROTONIX) 40 mg tablet Take 40 mg by mouth daily      sucralfate (CARAFATE) 1 g/10 mL suspension Take 10 mL by mouth 2 (two) times a day      vitamin B-12 (VITAMIN B-12) 1,000 mcg tablet Take 1,000 mcg by mouth every morning      EPINEPHrine (EPIPEN) 0.3 mg/0.3 mL SOAJ Inject 0.3 mL (0.3 mg total) into a muscle once for 1 dose  Qty: 0.6 mL, Refills: 0    Associated Diagnoses: Acute allergic reaction, initial encounter      ondansetron (ZOFRAN-ODT) 4 mg disintegrating tablet Take 4 mg by mouth every 6 (six) hours as needed for nausea or vomiting             No discharge procedures on file.     PDMP Review     None          ED Provider  Electronically Signed by           Flaco Szymanski MD  08/09/23 [id number] Pal Bergman MD  08/09/23 0258

## 2023-08-09 ENCOUNTER — ANESTHESIA EVENT (INPATIENT)
Dept: PERIOP | Facility: HOSPITAL | Age: 83
End: 2023-08-09
Payer: COMMERCIAL

## 2023-08-09 ENCOUNTER — ANESTHESIA (INPATIENT)
Dept: PERIOP | Facility: HOSPITAL | Age: 83
End: 2023-08-09
Payer: COMMERCIAL

## 2023-08-09 ENCOUNTER — APPOINTMENT (OUTPATIENT)
Dept: PERIOP | Facility: HOSPITAL | Age: 83
DRG: 384 | End: 2023-08-09
Attending: STUDENT IN AN ORGANIZED HEALTH CARE EDUCATION/TRAINING PROGRAM
Payer: COMMERCIAL

## 2023-08-09 PROBLEM — M15.9 GENERALIZED OSTEOARTHRITIS: Chronic | Status: ACTIVE | Noted: 2023-08-09

## 2023-08-09 PROBLEM — Z86.19 HISTORY OF HELICOBACTER PYLORI INFECTION: Chronic | Status: ACTIVE | Noted: 2023-08-09

## 2023-08-09 PROBLEM — K44.9 HIATAL HERNIA WITH GERD WITHOUT ESOPHAGITIS: Chronic | Status: ACTIVE | Noted: 2021-04-16

## 2023-08-09 PROBLEM — N18.32 TYPE 2 DIABETES MELLITUS WITH STAGE 3B CHRONIC KIDNEY DISEASE, WITHOUT LONG-TERM CURRENT USE OF INSULIN (HCC): Chronic | Status: ACTIVE | Noted: 2021-06-30

## 2023-08-09 PROBLEM — N18.32 STAGE 3B CHRONIC KIDNEY DISEASE (HCC): Chronic | Status: ACTIVE | Noted: 2023-08-09

## 2023-08-09 PROBLEM — R79.89 ELEVATED D-DIMER: Status: ACTIVE | Noted: 2023-08-09

## 2023-08-09 PROBLEM — N28.1 ACQUIRED RENAL CYST OF LEFT KIDNEY: Status: ACTIVE | Noted: 2023-08-09

## 2023-08-09 PROBLEM — E11.9 DIABETES MELLITUS (HCC): Status: RESOLVED | Noted: 2021-04-16 | Resolved: 2023-08-09

## 2023-08-09 PROBLEM — K21.9 HIATAL HERNIA WITH GERD WITHOUT ESOPHAGITIS: Chronic | Status: ACTIVE | Noted: 2021-04-16

## 2023-08-09 PROBLEM — G89.29 CHRONIC PAIN: Chronic | Status: ACTIVE | Noted: 2023-08-09

## 2023-08-09 PROBLEM — E11.22 TYPE 2 DIABETES MELLITUS WITH STAGE 3B CHRONIC KIDNEY DISEASE, WITHOUT LONG-TERM CURRENT USE OF INSULIN (HCC): Chronic | Status: ACTIVE | Noted: 2021-06-30

## 2023-08-09 LAB
ABO GROUP BLD BPU: NORMAL
ALBUMIN SERPL BCP-MCNC: 3.5 G/DL (ref 3.5–5)
ALP SERPL-CCNC: 50 U/L (ref 34–104)
ALT SERPL W P-5'-P-CCNC: 8 U/L (ref 7–52)
ANION GAP SERPL CALCULATED.3IONS-SCNC: 5 MMOL/L
AST SERPL W P-5'-P-CCNC: 13 U/L (ref 13–39)
ATRIAL RATE: 81 BPM
BASOPHILS # BLD AUTO: 0.04 THOUSANDS/ÂΜL (ref 0–0.1)
BASOPHILS NFR BLD AUTO: 1 % (ref 0–1)
BILIRUB SERPL-MCNC: 1.09 MG/DL (ref 0.2–1)
BPU ID: NORMAL
BUN SERPL-MCNC: 9 MG/DL (ref 5–25)
CALCIUM SERPL-MCNC: 8.9 MG/DL (ref 8.4–10.2)
CHLORIDE SERPL-SCNC: 104 MMOL/L (ref 96–108)
CHOLEST SERPL-MCNC: 157 MG/DL
CO2 SERPL-SCNC: 29 MMOL/L (ref 21–32)
CREAT SERPL-MCNC: 1.03 MG/DL (ref 0.6–1.3)
CROSSMATCH: NORMAL
EOSINOPHIL # BLD AUTO: 0.08 THOUSAND/ÂΜL (ref 0–0.61)
EOSINOPHIL NFR BLD AUTO: 1 % (ref 0–6)
ERYTHROCYTE [DISTWIDTH] IN BLOOD BY AUTOMATED COUNT: 17.9 % (ref 11.6–15.1)
EST. AVERAGE GLUCOSE BLD GHB EST-MCNC: 131 MG/DL
FERRITIN SERPL-MCNC: 8 NG/ML (ref 11–307)
FOLATE SERPL-MCNC: 5.7 NG/ML
GFR SERPL CREATININE-BSD FRML MDRD: 50 ML/MIN/1.73SQ M
GLUCOSE SERPL-MCNC: 100 MG/DL (ref 65–140)
GLUCOSE SERPL-MCNC: 104 MG/DL (ref 65–140)
GLUCOSE SERPL-MCNC: 108 MG/DL (ref 65–140)
GLUCOSE SERPL-MCNC: 125 MG/DL (ref 65–140)
GLUCOSE SERPL-MCNC: 87 MG/DL (ref 65–140)
GLUCOSE SERPL-MCNC: 96 MG/DL (ref 65–140)
HBA1C MFR BLD: 6.2 %
HCT VFR BLD AUTO: 28.2 % (ref 34.8–46.1)
HCT VFR BLD AUTO: 32.3 % (ref 34.8–46.1)
HDLC SERPL-MCNC: 42 MG/DL
HGB BLD-MCNC: 8.2 G/DL (ref 11.5–15.4)
HGB BLD-MCNC: 9.3 G/DL (ref 11.5–15.4)
HGB BLD-MCNC: 9.7 G/DL (ref 11.5–15.4)
IMM GRANULOCYTES # BLD AUTO: 0.04 THOUSAND/UL (ref 0–0.2)
IMM GRANULOCYTES NFR BLD AUTO: 1 % (ref 0–2)
INR PPP: 1.06 (ref 0.84–1.19)
IRON SATN MFR SERPL: 49 % (ref 15–50)
IRON SERPL-MCNC: 158 UG/DL (ref 50–170)
LDLC SERPL CALC-MCNC: 92 MG/DL (ref 0–100)
LYMPHOCYTES # BLD AUTO: 1.28 THOUSANDS/ÂΜL (ref 0.6–4.47)
LYMPHOCYTES NFR BLD AUTO: 23 % (ref 14–44)
MAGNESIUM SERPL-MCNC: 2.1 MG/DL (ref 1.9–2.7)
MCH RBC QN AUTO: 23.9 PG (ref 26.8–34.3)
MCHC RBC AUTO-ENTMCNC: 29.1 G/DL (ref 31.4–37.4)
MCV RBC AUTO: 82 FL (ref 82–98)
MONOCYTES # BLD AUTO: 0.53 THOUSAND/ÂΜL (ref 0.17–1.22)
MONOCYTES NFR BLD AUTO: 10 % (ref 4–12)
NEUTROPHILS # BLD AUTO: 3.57 THOUSANDS/ÂΜL (ref 1.85–7.62)
NEUTS SEG NFR BLD AUTO: 64 % (ref 43–75)
NRBC BLD AUTO-RTO: 0 /100 WBCS
P AXIS: 58 DEGREES
PHOSPHATE SERPL-MCNC: 3.6 MG/DL (ref 2.3–4.1)
PLATELET # BLD AUTO: 336 THOUSANDS/UL (ref 149–390)
PMV BLD AUTO: 9.6 FL (ref 8.9–12.7)
POTASSIUM SERPL-SCNC: 4.7 MMOL/L (ref 3.5–5.3)
PR INTERVAL: 150 MS
PROT SERPL-MCNC: 6 G/DL (ref 6.4–8.4)
PROTHROMBIN TIME: 14.1 SECONDS (ref 11.6–14.5)
QRS AXIS: 57 DEGREES
QRSD INTERVAL: 76 MS
QT INTERVAL: 360 MS
QTC INTERVAL: 418 MS
RBC # BLD AUTO: 3.43 MILLION/UL (ref 3.81–5.12)
SODIUM SERPL-SCNC: 138 MMOL/L (ref 135–147)
T WAVE AXIS: 27 DEGREES
T4 FREE SERPL-MCNC: 0.7 NG/DL (ref 0.61–1.12)
TIBC SERPL-MCNC: 320 UG/DL (ref 250–450)
TRIGL SERPL-MCNC: 115 MG/DL
TSH SERPL DL<=0.05 MIU/L-ACNC: 5.76 UIU/ML (ref 0.45–4.5)
UNIT DISPENSE STATUS: NORMAL
UNIT PRODUCT CODE: NORMAL
UNIT PRODUCT VOLUME: 300 ML
UNIT RH: NORMAL
VENTRICULAR RATE: 81 BPM
VIT B12 SERPL-MCNC: 460 PG/ML (ref 180–914)
WBC # BLD AUTO: 5.54 THOUSAND/UL (ref 4.31–10.16)

## 2023-08-09 PROCEDURE — 88342 IMHCHEM/IMCYTCHM 1ST ANTB: CPT | Performed by: STUDENT IN AN ORGANIZED HEALTH CARE EDUCATION/TRAINING PROGRAM

## 2023-08-09 PROCEDURE — 43239 EGD BIOPSY SINGLE/MULTIPLE: CPT | Performed by: STUDENT IN AN ORGANIZED HEALTH CARE EDUCATION/TRAINING PROGRAM

## 2023-08-09 PROCEDURE — 85018 HEMOGLOBIN: CPT | Performed by: INTERNAL MEDICINE

## 2023-08-09 PROCEDURE — 88305 TISSUE EXAM BY PATHOLOGIST: CPT | Performed by: STUDENT IN AN ORGANIZED HEALTH CARE EDUCATION/TRAINING PROGRAM

## 2023-08-09 PROCEDURE — 84443 ASSAY THYROID STIM HORMONE: CPT | Performed by: FAMILY MEDICINE

## 2023-08-09 PROCEDURE — 85610 PROTHROMBIN TIME: CPT | Performed by: FAMILY MEDICINE

## 2023-08-09 PROCEDURE — 82746 ASSAY OF FOLIC ACID SERUM: CPT | Performed by: FAMILY MEDICINE

## 2023-08-09 PROCEDURE — 99223 1ST HOSP IP/OBS HIGH 75: CPT | Performed by: FAMILY MEDICINE

## 2023-08-09 PROCEDURE — 0DB68ZX EXCISION OF STOMACH, VIA NATURAL OR ARTIFICIAL OPENING ENDOSCOPIC, DIAGNOSTIC: ICD-10-PCS | Performed by: STUDENT IN AN ORGANIZED HEALTH CARE EDUCATION/TRAINING PROGRAM

## 2023-08-09 PROCEDURE — 80053 COMPREHEN METABOLIC PANEL: CPT | Performed by: FAMILY MEDICINE

## 2023-08-09 PROCEDURE — 82728 ASSAY OF FERRITIN: CPT | Performed by: FAMILY MEDICINE

## 2023-08-09 PROCEDURE — 85018 HEMOGLOBIN: CPT | Performed by: FAMILY MEDICINE

## 2023-08-09 PROCEDURE — 83540 ASSAY OF IRON: CPT | Performed by: FAMILY MEDICINE

## 2023-08-09 PROCEDURE — 84439 ASSAY OF FREE THYROXINE: CPT | Performed by: FAMILY MEDICINE

## 2023-08-09 PROCEDURE — 83036 HEMOGLOBIN GLYCOSYLATED A1C: CPT | Performed by: FAMILY MEDICINE

## 2023-08-09 PROCEDURE — 85014 HEMATOCRIT: CPT | Performed by: INTERNAL MEDICINE

## 2023-08-09 PROCEDURE — 97167 OT EVAL HIGH COMPLEX 60 MIN: CPT

## 2023-08-09 PROCEDURE — 82948 REAGENT STRIP/BLOOD GLUCOSE: CPT

## 2023-08-09 PROCEDURE — 99223 1ST HOSP IP/OBS HIGH 75: CPT | Performed by: STUDENT IN AN ORGANIZED HEALTH CARE EDUCATION/TRAINING PROGRAM

## 2023-08-09 PROCEDURE — 93010 ELECTROCARDIOGRAM REPORT: CPT | Performed by: INTERNAL MEDICINE

## 2023-08-09 PROCEDURE — 80061 LIPID PANEL: CPT | Performed by: FAMILY MEDICINE

## 2023-08-09 PROCEDURE — 97163 PT EVAL HIGH COMPLEX 45 MIN: CPT

## 2023-08-09 PROCEDURE — 88341 IMHCHEM/IMCYTCHM EA ADD ANTB: CPT | Performed by: STUDENT IN AN ORGANIZED HEALTH CARE EDUCATION/TRAINING PROGRAM

## 2023-08-09 PROCEDURE — C9113 INJ PANTOPRAZOLE SODIUM, VIA: HCPCS | Performed by: FAMILY MEDICINE

## 2023-08-09 PROCEDURE — 83735 ASSAY OF MAGNESIUM: CPT | Performed by: FAMILY MEDICINE

## 2023-08-09 PROCEDURE — 85025 COMPLETE CBC W/AUTO DIFF WBC: CPT | Performed by: FAMILY MEDICINE

## 2023-08-09 PROCEDURE — 30233N1 TRANSFUSION OF NONAUTOLOGOUS RED BLOOD CELLS INTO PERIPHERAL VEIN, PERCUTANEOUS APPROACH: ICD-10-PCS | Performed by: EMERGENCY MEDICINE

## 2023-08-09 PROCEDURE — 83550 IRON BINDING TEST: CPT | Performed by: FAMILY MEDICINE

## 2023-08-09 PROCEDURE — 82607 VITAMIN B-12: CPT | Performed by: FAMILY MEDICINE

## 2023-08-09 PROCEDURE — 84100 ASSAY OF PHOSPHORUS: CPT | Performed by: FAMILY MEDICINE

## 2023-08-09 RX ORDER — ESCITALOPRAM OXALATE 5 MG/1
5 TABLET ORAL
Status: ON HOLD | COMMUNITY
Start: 2023-07-17 | End: 2023-08-09 | Stop reason: SDDI

## 2023-08-09 RX ORDER — ONDANSETRON 2 MG/ML
4 INJECTION INTRAMUSCULAR; INTRAVENOUS ONCE AS NEEDED
Status: DISCONTINUED | OUTPATIENT
Start: 2023-08-09 | End: 2023-08-11 | Stop reason: HOSPADM

## 2023-08-09 RX ORDER — INSULIN LISPRO 100 [IU]/ML
1-5 INJECTION, SOLUTION INTRAVENOUS; SUBCUTANEOUS EVERY 6 HOURS SCHEDULED
Status: DISCONTINUED | OUTPATIENT
Start: 2023-08-09 | End: 2023-08-10

## 2023-08-09 RX ORDER — ACETAMINOPHEN 325 MG/1
650 TABLET ORAL EVERY 6 HOURS PRN
Status: DISCONTINUED | OUTPATIENT
Start: 2023-08-09 | End: 2023-08-11 | Stop reason: HOSPADM

## 2023-08-09 RX ORDER — HYDROCODONE BITARTRATE AND ACETAMINOPHEN 5; 325 MG/1; MG/1
1 TABLET ORAL 3 TIMES DAILY PRN
Status: DISCONTINUED | OUTPATIENT
Start: 2023-08-09 | End: 2023-08-11 | Stop reason: HOSPADM

## 2023-08-09 RX ORDER — LEVOTHYROXINE SODIUM 0.07 MG/1
75 TABLET ORAL
Status: DISCONTINUED | OUTPATIENT
Start: 2023-08-09 | End: 2023-08-11 | Stop reason: HOSPADM

## 2023-08-09 RX ORDER — SUCRALFATE ORAL 1 G/10ML
1 SUSPENSION ORAL 2 TIMES DAILY
Status: DISCONTINUED | OUTPATIENT
Start: 2023-08-09 | End: 2023-08-11 | Stop reason: HOSPADM

## 2023-08-09 RX ORDER — FERROUS SULFATE 325(65) MG
325 TABLET ORAL
Status: DISCONTINUED | OUTPATIENT
Start: 2023-08-09 | End: 2023-08-11 | Stop reason: HOSPADM

## 2023-08-09 RX ORDER — ONDANSETRON 2 MG/ML
4 INJECTION INTRAMUSCULAR; INTRAVENOUS EVERY 4 HOURS PRN
Status: DISCONTINUED | OUTPATIENT
Start: 2023-08-09 | End: 2023-08-11 | Stop reason: HOSPADM

## 2023-08-09 RX ORDER — PROPOFOL 10 MG/ML
INJECTION, EMULSION INTRAVENOUS AS NEEDED
Status: DISCONTINUED | OUTPATIENT
Start: 2023-08-09 | End: 2023-08-09

## 2023-08-09 RX ORDER — LANOLIN ALCOHOL/MO/W.PET/CERES
3 CREAM (GRAM) TOPICAL
Status: DISCONTINUED | OUTPATIENT
Start: 2023-08-09 | End: 2023-08-11 | Stop reason: HOSPADM

## 2023-08-09 RX ORDER — LANOLIN ALCOHOL/MO/W.PET/CERES
1000 CREAM (GRAM) TOPICAL EVERY MORNING
COMMUNITY
Start: 2023-06-30

## 2023-08-09 RX ORDER — SODIUM CHLORIDE, SODIUM LACTATE, POTASSIUM CHLORIDE, CALCIUM CHLORIDE 600; 310; 30; 20 MG/100ML; MG/100ML; MG/100ML; MG/100ML
INJECTION, SOLUTION INTRAVENOUS CONTINUOUS PRN
Status: DISCONTINUED | OUTPATIENT
Start: 2023-08-09 | End: 2023-08-09

## 2023-08-09 RX ORDER — LIDOCAINE HYDROCHLORIDE 10 MG/ML
INJECTION, SOLUTION EPIDURAL; INFILTRATION; INTRACAUDAL; PERINEURAL AS NEEDED
Status: DISCONTINUED | OUTPATIENT
Start: 2023-08-09 | End: 2023-08-09

## 2023-08-09 RX ORDER — PANTOPRAZOLE SODIUM 40 MG/10ML
40 INJECTION, POWDER, LYOPHILIZED, FOR SOLUTION INTRAVENOUS EVERY 12 HOURS SCHEDULED
Status: DISCONTINUED | OUTPATIENT
Start: 2023-08-09 | End: 2023-08-11 | Stop reason: HOSPADM

## 2023-08-09 RX ORDER — ATORVASTATIN CALCIUM 10 MG/1
20 TABLET, FILM COATED ORAL DAILY
Status: DISCONTINUED | OUTPATIENT
Start: 2023-08-09 | End: 2023-08-11 | Stop reason: HOSPADM

## 2023-08-09 RX ORDER — SUCRALFATE ORAL 1 G/10ML
10 SUSPENSION ORAL 2 TIMES DAILY
COMMUNITY
Start: 2023-08-02

## 2023-08-09 RX ADMIN — SODIUM CHLORIDE, SODIUM LACTATE, POTASSIUM CHLORIDE, AND CALCIUM CHLORIDE: .6; .31; .03; .02 INJECTION, SOLUTION INTRAVENOUS at 15:23

## 2023-08-09 RX ADMIN — Medication 3 MG: at 21:19

## 2023-08-09 RX ADMIN — CYANOCOBALAMIN TAB 1000 MCG 1000 MCG: 1000 TAB at 08:12

## 2023-08-09 RX ADMIN — PANTOPRAZOLE SODIUM 40 MG: 40 INJECTION, POWDER, FOR SOLUTION INTRAVENOUS at 02:23

## 2023-08-09 RX ADMIN — HYDROCODONE BITARTRATE AND ACETAMINOPHEN 1 TABLET: 5; 325 TABLET ORAL at 21:14

## 2023-08-09 RX ADMIN — PROPOFOL 50 MG: 10 INJECTION, EMULSION INTRAVENOUS at 15:36

## 2023-08-09 RX ADMIN — HYDROCODONE BITARTRATE AND ACETAMINOPHEN 1 TABLET: 5; 325 TABLET ORAL at 05:26

## 2023-08-09 RX ADMIN — SUCRALFATE 1 G: 1 SUSPENSION ORAL at 02:23

## 2023-08-09 RX ADMIN — MORPHINE SULFATE 2 MG: 2 INJECTION, SOLUTION INTRAMUSCULAR; INTRAVENOUS at 00:10

## 2023-08-09 RX ADMIN — LEVOTHYROXINE SODIUM 75 MCG: 75 TABLET ORAL at 05:23

## 2023-08-09 RX ADMIN — PANTOPRAZOLE SODIUM 40 MG: 40 INJECTION, POWDER, FOR SOLUTION INTRAVENOUS at 21:35

## 2023-08-09 RX ADMIN — PANTOPRAZOLE SODIUM 40 MG: 40 INJECTION, POWDER, FOR SOLUTION INTRAVENOUS at 10:24

## 2023-08-09 RX ADMIN — SUCRALFATE 1 G: 1 SUSPENSION ORAL at 21:10

## 2023-08-09 RX ADMIN — FERROUS SULFATE TAB 325 MG (65 MG ELEMENTAL FE) 325 MG: 325 (65 FE) TAB at 08:11

## 2023-08-09 RX ADMIN — PROPOFOL 50 MG: 10 INJECTION, EMULSION INTRAVENOUS at 15:34

## 2023-08-09 RX ADMIN — PROPOFOL 50 MG: 10 INJECTION, EMULSION INTRAVENOUS at 15:33

## 2023-08-09 RX ADMIN — ATORVASTATIN CALCIUM 20 MG: 10 TABLET, FILM COATED ORAL at 08:12

## 2023-08-09 RX ADMIN — LIDOCAINE HYDROCHLORIDE 50 MG: 10 INJECTION, SOLUTION EPIDURAL; INFILTRATION; INTRACAUDAL; PERINEURAL at 15:33

## 2023-08-09 NOTE — ASSESSMENT & PLAN NOTE
Chronic stable condition. Treated as outpatient with levothyroxine 75 mcg daily. Continue current dose for now. Check TSH with morning labs and adjust dose accordingly.

## 2023-08-09 NOTE — ASSESSMENT & PLAN NOTE
Chronic stable condition, primarily involving bilateral hands, knees, ankles, and feet. Treated as outpatient with voltaren gel as needed (at least daily) and norco 5-325 mg as needed (typically BID). Continue outpatient medication regimen with addition of tylenol as needed for mild pain.

## 2023-08-09 NOTE — ASSESSMENT & PLAN NOTE
Lab Results   Component Value Date    EGFR 50 08/09/2023    EGFR 46 08/08/2023    EGFR 40 03/15/2022    CREATININE 1.03 08/09/2023    CREATININE 1.09 08/08/2023    CREATININE 1.23 03/15/2022     Chronic stable condition. Patient at baseline creatinine 1.09 at time of presentation. Monitor renal function and fluid/electrolyte status. Minimize nephrotoxins.

## 2023-08-09 NOTE — ASSESSMENT & PLAN NOTE
Chronic condition. Symptoms worsening over past few months despite multiple medication changes. Patient has history of H pylori detected on EGD in 2021. 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 Currently treated as outpatient with pantoprazole 40 mg daily and sucralfate 1 gram BID. Continue sucralfate; switch pantoprazole to IV form BID due to severe anemia. Consult GI.

## 2023-08-09 NOTE — ASSESSMENT & PLAN NOTE
Chronic stable condition. Treated as outpatient with atorvastatin 20 mg daily. Continue outpatient medication regimen.

## 2023-08-09 NOTE — CONSULTS
[facility]  Consultation Note  Encounter: [id number]     PATIENT INFO     Name: Julio César Ayala  YOB: 1940   Age: 79 y.o. Sex: female   MRN: [id number]  Unit/Bed#: 861-93     REASON FOR CONSULTATION     Symptomatic anemia, GERD     ASSESSMENT & PLAN     Julio César Ayala is a 79 y.o. female with complaint of worsening GERD symptoms with known underlying hiatal hernia. Experiencing epigastric pain described as burning, also with symptomatic anemia with hemoglobin on presentation of 6.9, improved after 1 unit transfusion. 1.  GERD  2. Hiatal hernia  3. Symptomatic anemia  4. History of H. Pylori    I suspect that her worsening heartburn and GERD symptoms is due to her underlying hiatal hernia which was medium sized on previous EGD. It is unclear if her anemia is due to GI bleeding as she denies seeing any melena or hematochezia. It is certainly possible that there is occult blood loss from GI source. Possible etiologies include peptic ulcer disease versus recurrent H. pylori infection versus gastritis versus Kai lesions versus GAVE versus AVM versus lower GI source. Plan for EGD today  Keep NPO for procedure  Continue IV PPI BID  Depending on upper endoscopy findings, may warrant colonoscopy if no source of bleeding is found to rule out GI source of anemia  Monitor blood counts  Transfuse further as needed  Hold antithrombotics and anticoagulants     HISTORY OF PRESENT ILLNESS       Julio César Ayala is a 79 y.o. female with history of GERD, hiatal hernia, hypothyroidism, diabetes, dyslipidemia, CKD stage III, history of H. pylori infection status posttreatment, presents with complaints of worsening GERD symptoms and epigastric pain described as burning. This is also affected her appetite. Patient was previously hospitalized in 2021 with similar episode.   She underwent EGD at that time which saw medium sized hiatal hernia as well as biopsies being positive for H. pylori. She was treated with triple therapy. She also reported having symptoms of anemia including generalized weakness and malaise, decreased energy, fatigue, cold intolerance, dyspnea on exertion. She was found on admission to have hemoglobin of 6.9. This responded appropriately after 1 unit of blood. However, due to the anemia and her GI complaints, GI evaluation was requested. Per patient, she has been taking her PPI but states that her reflux symptoms have persisted. She denies any hematochezia or melena or change in bowel habits. She reports having normal bowel movement daily.      REVIEW OF SYSTEMS     CONSTITUTIONAL: Denies any fever, chills, rigors, and weight loss  HEENT: No earache or tinnitus, denies hearing loss or visual disturbances  CARDIOVASCULAR: No chest pain or palpitations  RESPIRATORY: Denies any cough, hemoptysis  GASTROINTESTINAL: As noted in the History of Present Illness  GENITOURINARY: No problems with urination, denies any hematuria or dysuria  NEUROLOGIC: No dizziness or vertigo, denies headaches   MUSCULOSKELETAL: +joint pains  SKIN: Denies skin rashes or itching  ENDOCRINE: Denies excessive thirst, denies intolerance to heat or cold  PSYCHOSOCIAL: Denies depression or anxiety, denies any recent memory loss     Historical Information   Past Medical History:   Diagnosis Date   • Acquired renal cyst of left kidney 8/9/2023   • Arthritis    • Breast cancer ([address])    • Cancer ([address])    • Diabetes mellitus ([address])    • Generalized osteoarthritis 8/9/2023   • Hiatal hernia    • Hiatal hernia with GERD without esophagitis 4/16/2021   • History of Helicobacter pylori infection 8/9/2023   • Hypertension    • Stage 3b chronic kidney disease ([address]) 8/9/2023   • Type 2 diabetes mellitus with stage 3b chronic kidney disease, without long-term current use of insulin ([address]) 6/30/2021     Past Surgical History:   Procedure Laterality Date   • ABDOMINAL SURGERY      states in maybe 40s-50s; "my intestines were wrapped around each other"   • APPENDECTOMY     • CHOLECYSTECTOMY     • TONSILLECTOMY       Social History   Social History     Substance and Sexual Activity   Alcohol Use Never     Social History     Substance and Sexual Activity   Drug Use Never     Social History     Tobacco Use   Smoking Status Never   Smokeless Tobacco Never     History reviewed. No pertinent family history.      MEDICATIONS & ALLERGIES     Meds/Allergies   Medications Prior to Admission   Medication   • acetaminophen (TYLENOL) 500 mg tablet   • atorvastatin (LIPITOR) 20 mg tablet   • Diclofenac Sodium (VOLTAREN) 1 %   • ferrous sulfate 325 (65 Fe) mg tablet   • HYDROcodone-acetaminophen (NORCO) 5-325 mg per tablet   • levothyroxine 75 mcg tablet   • pantoprazole (PROTONIX) 40 mg tablet   • sucralfate (CARAFATE) 1 g/10 mL suspension   • vitamin B-12 (VITAMIN B-12) 1,000 mcg tablet   • EPINEPHrine (EPIPEN) 0.3 mg/0.3 mL SOAJ   • ondansetron (ZOFRAN-ODT) 4 mg disintegrating tablet     Current Facility-Administered Medications   Medication Dose Route Frequency   • acetaminophen (TYLENOL) tablet 650 mg  650 mg Oral Q6H PRN   • atorvastatin (LIPITOR) tablet 20 mg  20 mg Oral Daily   • cyanocobalamin (VITAMIN B-12) tablet 1,000 mcg  1,000 mcg Oral QAM   • Diclofenac Sodium (VOLTAREN) 1 % topical gel 2 g  2 g Topical 4x Daily PRN   • ferrous sulfate tablet 325 mg  325 mg Oral Daily With Breakfast   • HYDROcodone-acetaminophen (NORCO) 5-325 mg per tablet 1 tablet  1 tablet Oral TID PRN   • insulin lispro (HumaLOG) 100 units/mL subcutaneous injection 1-5 Units  1-5 Units Subcutaneous Q6H [facility]   • levothyroxine tablet 75 mcg  75 mcg Oral Early Morning   • ondansetron (ZOFRAN) injection 4 mg  4 mg Intravenous Q4H PRN   • pantoprazole (PROTONIX) injection 40 mg  40 mg Intravenous Q12H MARIA EUGENIA   • sucralfate (CARAFATE) oral suspension (VANNESA/PEDS) 1 g  1 g Oral BID     Allergies   Allergen Reactions   • Penicillins Other (See Comments), Shortness Of Breath and Throat Swelling     "dizzy, nauseous"   • Dye [Iodinated Contrast Media] Nausea Only   • Propoxyphene Nausea Only   • Latex Rash        PHYSICAL EXAM     Objective   Blood pressure 139/67, pulse 65, temperature 97.8 °F (36.6 °C), temperature source Oral, resp. rate 16, height 5' (1.524 m), weight 59.5 kg (131 lb 2.8 oz), SpO2 97 %. Body mass index is 25.62 kg/m².     Intake/Output Summary (Last 24 hours) at 8/9/2023 0920  Last data filed at 8/9/2023 7208  Gross per 24 hour   Intake 317.92 ml   Output --   Net 317.92 ml     Medication Administration - last 24 hours from 08/08/2023 0920 to 08/09/2023 0920       Date/Time Order Dose Route Action Action by     08/08/2023 1938 EDT ondansetron (ZOFRAN) injection 4 mg 4 mg Intravenous Given Weston Stewart RN     08/08/2023 2121 EDT ondansetron (ZOFRAN) injection 4 mg 4 mg Intravenous Given Weston Stewart RN     08/08/2023 2144 EDT iohexol (OMNIPAQUE) 350 MG/ML injection (SINGLE-DOSE) 100 mL 100 mL Intravenous Given [facility]     08/09/2023 0010 EDT morphine injection 2 mg 2 mg Intravenous Given Weston Stewart RN     08/09/2023 8552 EDT insulin lispro (HumaLOG) 100 units/mL subcutaneous injection 1-5 Units -- Subcutaneous Not Given Bhumika Tay RN     08/09/2023 0201 EDT insulin lispro (HumaLOG) 100 units/mL subcutaneous injection 1-5 Units -- Subcutaneous Not Given Bhumika Tay RN     08/09/2023 5692 EDT HYDROcodone-acetaminophen (NORCO) 5-325 mg per tablet 1 tablet 1 tablet Oral Given [facility] Macie [place], [place]     08/09/2023 6747 EDT atorvastatin (LIPITOR) tablet 20 mg 20 mg [facility]     08/09/2023 1254 EDT ferrous sulfate tablet 325 mg 325 mg Oral Given [facility], AMANDA     08/09/2023 0523 EDT levothyroxine tablet 75 mcg 75 mcg Oral Given Bruce Gey [place], [place]     08/09/2023 2987 EDT pantoprazole (PROTONIX) injection 40 mg 40 mg Intravenous Given Bhumika Tay [place]     08/09/2023 9743 EDT sucralfate (Sofia Baker) oral suspension (VANNESA/PEDS) 1 g 1 g Oral Given Fang Veras     08/09/2023 8240 EDT cyanocobalamin (VITAMIN B-12) tablet 1,000 mcg 1,000 mcg Oral Given Debo Crane RN          General Appearance:   Alert, cooperative, no distress; elderly female   HEENT:   Normocephalic, atraumatic, anicteric     Lungs:   Equal chest rise, respirations unlabored    Heart:   Regular rate and rhythm   Abdomen:   Soft, mild discomfort to palpation in upper abdomen, non-distended; normal bowel sounds; no masses, no organomegaly    Rectal:   Deferred    Extremities:   No cyanosis, clubbing or edema    Neuro:    Moves all 4 extremities    Skin:   No jaundice, rashes, or lesions      Invasive Devices     Peripheral Intravenous Line  Duration           Peripheral IV 08/08/23 Left Antecubital <1 day    Peripheral IV 08/08/23 Right Antecubital <1 day                 LABORATORY RESULTS     Admission on 08/08/2023   Component Date Value   • Ventricular Rate 08/08/2023 81    • Atrial Rate 08/08/2023 81    • VA Interval 08/08/2023 150    • QRSD Interval 08/08/2023 76    • QT Interval 08/08/2023 360    • QTC Interval 08/08/2023 418    • P Axis 08/08/2023 58    • QRS Axis 08/08/2023 57    • T Wave Axis 08/08/2023 27    • WBC 08/08/2023 5.02    • RBC 08/08/2023 3.00 (L)    • Hemoglobin 08/08/2023 6.9 (LL)    • Hematocrit 08/08/2023 25.0 (L)    • MCV 08/08/2023 83    • MCH 08/08/2023 23.0 (L)    • MCHC 08/08/2023 27.6 (L)    • RDW 08/08/2023 18.0 (H)    • MPV 08/08/2023 9.6    • Platelets 12/02/2363 349    • nRBC 08/08/2023 0    • Neutrophils Relative 08/08/2023 64    • Immat GRANS % 08/08/2023 1    • Lymphocytes Relative 08/08/2023 26    • Monocytes Relative 08/08/2023 8    • Eosinophils Relative 08/08/2023 1    • Basophils Relative 08/08/2023 0    • Neutrophils Absolute 08/08/2023 3.19    • Immature Grans Absolute 08/08/2023 0.05    • Lymphocytes Absolute 08/08/2023 1.28    • Monocytes Absolute 08/08/2023 0.41    • Eosinophils Absolute 08/08/2023 0.07    • Basophils Absolute 08/08/2023 0.02    • Protime 08/08/2023 14.1    • INR 08/08/2023 1.07    • PTT 08/08/2023 32    • SARS-CoV-2 08/08/2023 Negative    • INFLUENZA A PCR 08/08/2023 Negative    • INFLUENZA B PCR 08/08/2023 Negative    • RSV PCR 08/08/2023 Negative    • Color, UA 08/08/2023 Light Yellow    • Clarity, UA 08/08/2023 Clear    • Specific Gravity, UA 08/08/2023 1.015    • pH, UA 08/08/2023 7.0    • Leukocytes, UA 08/08/2023 Trace (A)    • Nitrite, UA 08/08/2023 Negative    • Protein, UA 08/08/2023 Negative    • Glucose, UA 08/08/2023 Negative    • Ketones, UA 08/08/2023 Negative    • Urobilinogen, UA 08/08/2023 0.2    • Bilirubin, UA 08/08/2023 Negative    • Occult Blood, UA 08/08/2023 Negative    • Sodium 08/08/2023 139    • Potassium 08/08/2023 4.1    • Chloride 08/08/2023 104    • CO2 08/08/2023 28    • ANION GAP 08/08/2023 7    • BUN 08/08/2023 10    • Creatinine 08/08/2023 1.09    • Glucose 08/08/2023 128    • Calcium 08/08/2023 8.6    • eGFR 08/08/2023 46    • Total Bilirubin 08/08/2023 0.26    • Bilirubin, Direct 08/08/2023 0.04    • Alkaline Phosphatase 08/08/2023 49    • AST 08/08/2023 13    • ALT 08/08/2023 8    • Total Protein 08/08/2023 6.2 (L)    • Albumin 08/08/2023 3.7    • Magnesium 08/08/2023 2.0    • Lipase 08/08/2023 47    • LACTIC ACID 08/08/2023 0.9    • D-Dimer, Quant 08/08/2023 1.20 (H)    • HS TnI random 08/08/2023 7 (L)    • ABO Grouping 08/08/2023 O    • Rh Factor 08/08/2023 Positive    • Antibody Screen 08/08/2023 Negative    • Specimen Expiration Date 08/08/2023 [id number]    • RBC, UA 08/08/2023 0-1    • WBC, UA 08/08/2023 2-4    • Epithelial Cells 08/08/2023 None Seen    • Bacteria, UA 08/08/2023 None Seen    • OTHER OBSERVATIONS 08/08/2023 Transitional Epithelial Cells    • Unit Product Code 08/09/2023 [id number]    • Unit Number 08/09/2023 [id number]    • Unit ABO 08/09/2023 O    • Unit [facility] 08/09/2023 POS    • Crossmatch 08/09/2023 Compatible    • Unit Dispense Status 08/09/2023 Presumed Trans    • Unit Product Volume 08/09/2023 300    • POC Glucose 08/09/2023 108    • WBC 08/09/2023 5.54    • RBC 08/09/2023 3.43 (L)    • Hemoglobin 08/09/2023 8.2 (L)    • Hematocrit 08/09/2023 28.2 (L)    • MCV 08/09/2023 82    • MCH 08/09/2023 23.9 (L)    • MCHC 08/09/2023 29.1 (L)    • RDW 08/09/2023 17.9 (H)    • MPV 08/09/2023 9.6    • Platelets 55/05/2648 336    • nRBC 08/09/2023 0    • Neutrophils Relative 08/09/2023 64    • Immat GRANS % 08/09/2023 1    • Lymphocytes Relative 08/09/2023 23    • Monocytes Relative 08/09/2023 10    • Eosinophils Relative 08/09/2023 1    • Basophils Relative 08/09/2023 1    • Neutrophils Absolute 08/09/2023 3.57    • Immature Grans Absolute 08/09/2023 0.04    • Lymphocytes Absolute 08/09/2023 1.28    • Monocytes Absolute 08/09/2023 0.53    • Eosinophils Absolute 08/09/2023 0.08    • Basophils Absolute 08/09/2023 0.04    • Sodium 08/09/2023 138    • Potassium 08/09/2023 4.7    • Chloride 08/09/2023 104    • CO2 08/09/2023 29    • ANION GAP 08/09/2023 5    • BUN 08/09/2023 9    • Creatinine 08/09/2023 1.03    • Glucose 08/09/2023 96    • Calcium 08/09/2023 8.9    • AST 08/09/2023 13    • ALT 08/09/2023 8    • Alkaline Phosphatase 08/09/2023 50    • Total Protein 08/09/2023 6.0 (L)    • Albumin 08/09/2023 3.5    • Total Bilirubin 08/09/2023 1.09 (H)    • eGFR 08/09/2023 50    • Magnesium 08/09/2023 2.1    • Protime 08/09/2023 14.1    • INR 08/09/2023 1.06    • Phosphorus 08/09/2023 3.6    • Cholesterol 08/09/2023 157    • Triglycerides 08/09/2023 115    • HDL, Direct 08/09/2023 42 (L)    • LDL Calculated 08/09/2023 92    • TSH 3RD GENERATON 08/09/2023 5.758 (H)    • POC Glucose 08/09/2023 100         IMAGING RESULTS     PE Study with CT Abdomen and Pelvis with contrast    Result Date: 8/8/2023  Narrative: CT PULMONARY ANGIOGRAM OF THE CHEST AND CT ABDOMEN AND PELVIS WITH INTRAVENOUS CONTRAST INDICATION:   Chest and abdominal pain and elevated D-dimer. COMPARISON: 6/30/2021 TECHNIQUE:  CT examination of the chest, abdomen and pelvis was performed. Thin section CT angiographic technique was used in the chest in order to evaluate for pulmonary embolus and coronal 3D MIP postprocessing was performed on the acquisition scanner. Multiplanar 2D reformatted images were created from the source data. This examination, like all CT scans performed in the [facility], was performed utilizing techniques to minimize radiation dose exposure, including the use of iterative reconstruction and automated exposure control. Radiation dose length product (DLP) for this visit:  1057.57 mGy-cm IV Contrast:  100 mL of iohexol (OMNIPAQUE) Enteric Contrast:  Enteric contrast was not administered. FINDINGS: CHEST PULMONARY ARTERIAL TREE:  No filling defect in the visualized pulmonary arteries to suggest an acute embolus. LUNGS:  Lungs are clear. There is no tracheal or endobronchial lesion. PLEURA:  Unremarkable. HEART/AORTA: Mild cardiomegaly. No thoracic aortic aneurysm or dissection. MEDIASTINUM AND CELINE: No lymphadenopathy. CHEST WALL AND LOWER NECK:  Unremarkable. ABDOMEN LIVER/BILIARY TREE: Hepatomegaly. GALLBLADDER: Cholecystectomy. SPLEEN:  Unremarkable. PANCREAS:  Unremarkable. ADRENAL GLANDS:  Unremarkable. KIDNEYS/URETERS: Symmetric nephrographic phase enhancement of the kidneys. Left renal cortical 2.6 cm cyst. No obstructive uropathy. STOMACH AND BOWEL: Stable large hiatal hernia. No evidence of bowel obstruction, colitis or diverticulitis. APPENDIX:  No findings to suggest appendicitis. ABDOMINOPELVIC CAVITY:  No ascites. No pneumoperitoneum. No lymphadenopathy. VESSELS: No abdominal aortic aneurysm. PELVIS REPRODUCTIVE ORGANS: Prior hysterectomy. URINARY BLADDER:  Unremarkable. ABDOMINAL WALL/INGUINAL REGIONS:  Unremarkable. OSSEOUS STRUCTURES: Stable L4 anterolisthesis secondary to chronic disc and facet degenerative change. Impression: 1.  No evidence of acute pulmonary embolus, thoracic aortic aneurysm or dissection. No acute cardiopulmonary process. 2. No acute intra-abdominal or pelvic process. Workstation performed: [id number]     CT cervical spine without contrast    Result Date: 8/8/2023  Narrative: CT CERVICAL SPINE - WITHOUT CONTRAST INDICATION:   fall 2 bird ago. COMPARISON:  3/15/2022. TECHNIQUE:  CT examination of the cervical spine was performed without intravenous contrast.  Contiguous axial images were obtained. Multiplanar 2D reformatted images were created from the source data. Radiation dose length product (DLP) for this visit:  336.58 mGy-cm . This examination, like all CT scans performed in the [facility], was performed utilizing techniques to minimize radiation dose exposure, including the use of iterative  reconstruction and automated exposure control. IMAGE QUALITY:  Diagnostic. FINDINGS: ALIGNMENT:  Normal alignment of the cervical spine. No subluxation. VERTEBRAE:  No fracture. DEGENERATIVE CHANGES:  Mild multilevel cervical degenerative changes are noted without critical central canal stenosis. PREVERTEBRAL AND PARASPINAL SOFT TISSUES: Unremarkable THORACIC INLET:  Normal.     Impression: No cervical spine fracture or traumatic malalignment. Workstation performed: [id number]     CT head without contrast    Result Date: 8/8/2023  Narrative: CT BRAIN - WITHOUT CONTRAST INDICATION:   Head trauma, minor (Age >= 65y) fall 2 days ago. COMPARISON: 3/15/2022. TECHNIQUE:  CT examination of the brain was performed. Multiplanar 2D reformatted images were created from the source data. Radiation dose length product (DLP) for this visit:  913.57 mGy-cm . This examination, like all CT scans performed in the [facility], was performed utilizing techniques to minimize radiation dose exposure, including the use of iterative  reconstruction and automated exposure control. IMAGE QUALITY:  Diagnostic.  FINDINGS: PARENCHYMA: Decreased attenuation is noted in periventricular and subcortical white matter demonstrating an appearance that is statistically most likely to represent mild microangiopathic change; this appearance is similar when compared to most recent prior examination. No CT signs of acute infarction. No intracranial mass, mass effect or midline shift. No acute parenchymal hemorrhage. VENTRICLES AND EXTRA-AXIAL SPACES:  Normal for the patient's age. VISUALIZED ORBITS: Normal visualized orbits. PARANASAL SINUSES: Normal visualized paranasal sinuses. CALVARIUM AND EXTRACRANIAL SOFT TISSUES:  Normal.     Impression: No acute intracranial abnormality. Workstation performed: [id number]     I have personally reviewed pertinent imaging studies. Keshawn Rayo D.O. [id number] [facility]  Division of Gastroenterology & Hepatology  Available on [email or web address]    ** Please Note: This note is constructed using a voice recognition dictation system.  **

## 2023-08-09 NOTE — ASSESSMENT & PLAN NOTE
Chronic condition. Visible on prior imaging for several years, though not as well due to lack of contrast with those studies. Asymptomatic simple cyst approximately 2.6 cm diameter. No hematuria or other apparent complications. No further evaluation at this time; follow up with primary care as needed for development of any flank pain or urinary symptoms.

## 2023-08-09 NOTE — ASSESSMENT & PLAN NOTE
Lab Results   Component Value Date    HGBA1C 7.1 (H) 04/14/2022       Recent Labs     08/09/23  0157 08/09/23  0549   POCGLU 108 100       Blood Sugar Average: Last 72 hrs:  (P) 104     Last hemoglobin A1c 7.1 in April 2022. Treated as outpatient with diet/activity only; no medications. Check current A1c and initiate insulin protocol pending further evaluation. CKD 3B is chronic and stable. Patient at baseline creatinine 1.09 at time of presentation. Monitor renal function and fluid/electrolyte status. Minimize nephrotoxins.

## 2023-08-09 NOTE — CASE MANAGEMENT
Case Management Assessment & Discharge Planning Note    Patient name Kit Infante  Location [address] [facility] [place] [id number] MRN [id number]  : 1940 Date 2023       Current Admission Date: 2023  Current Admission Diagnosis:Symptomatic anemia   Patient Active Problem List    Diagnosis Date Noted   • Stage 3b chronic kidney disease ([address]) 2023   • Generalized osteoarthritis 2023   • History of Helicobacter pylori infection 2023   • Acquired renal cyst of left kidney 2023   • Elevated d-dimer 2023   • Symptomatic anemia 03/15/2022   • Other chest pain 2021   • Moderate protein-calorie malnutrition ([address]) 2021   • Epigastric pain 2021   • Essential hypertension 2021   • Dyslipidemia 2021   • Type 2 diabetes mellitus with stage 3b chronic kidney disease, without long-term current use of insulin ([address]) 2021   • Acquired hypothyroidism 2021   • Large hiatal hernia 2021   • Hiatal hernia with GERD without esophagitis 2021   • Hyperlipidemia 2021   • Ulnar nerve abnormality 2020   • Carpal tunnel syndrome of right wrist 2020   • Carpal tunnel syndrome of left wrist 2020      LOS (days): 0  Geometric Mean LOS (GMLOS) (days): 2.70  Days to GMLOS:2.1     OBJECTIVE:    Risk of Unplanned Readmission Score: 10.95         Current admission status: Inpatient  Referral Reason:  (discharge planning post acute care  follow up)    Preferred Pharmacy:   [id number] [address]  Phone: [phone] Fax: [phone]    Primary Care Provider: Hollis Yeager MD    Primary Insurance: [employer] [facility]  Secondary Insurance: [facility]    ASSESSMENT:    CM met with patient at the bedside,baseline information  was obtained. CM discussed the role of CM in helping the patient develop a discharge plan and assist the patient in carry out their plan.     Patient stated she lives in ran home with her son Elvia Ricketts. Bed and Bath on second floor       Active Health Care Proxies    There are no active Health Care Proxies on file. Advance Directives  Does patient have a Health Care POA?: Yes  Does patient have Advance Directives?: Yes  Advance Directives: Living will, Power of  for health care  Primary Contact: ella Gill  [phone]         Readmission Root Cause  30 Day Readmission: No    Patient Information  Mental Status: Alert  During Assessment patient was accompanied by: Not accompanied during assessment  Assessment information provided by[de-identified] Patient  Primary Caregiver: Family  Caregiver's Name[de-identified] Kelli Gamboa lives with patient  Caregiver's Relationship to Patient[de-identified] Family Member  Caregiver's Telephone Number[de-identified] [phone]  Support Systems: Son BUTCH)  [place] of Residence: [address] [place] [address]. do you live in?: [address] [facility] entry access options.  Select all that apply.: No steps to enter home  Type of Current Residence: [place]  In the last 12 months, was there a time when you were not able to pay the mortgage or rent on time?: No  In the last 12 months, how many places have you lived?: 1  In the last 12 months, was there a time when you did not have a steady place to sleep or slept in a shelter (including now)?: No  Homeless/housing insecurity resource given?: N/A  Living Arrangements: Lives w/ Son  Is patient a ?: No    Activities of Daily Living Prior to Admission  Functional Status: Independent  Completes ADLs independently?: Yes  Ambulates independently?: Yes  Does patient use assisted devices?: Yes  Assisted Devices (DME) used: Garlin Stage, Shower Chair, Straight Cane, Wheelchair  Does patient currently own DME?: Yes  What DME does the patient currently own?: Wheelchair, Shower Chair, Straight Cane, Garlin Stage  Does patient have a history of Outpatient Therapy (PT/OT)?: No  Does the patient have a history of Short-Term Rehab?: No  Does patient have a history of HHC?: No  Does patient currently have [address]?: No         Patient Information Continued  Income Source: Pension/detention  Does patient have prescription coverage?: Yes  Within the past 12 months, you worried that your food would run out before you got the money to buy more.: Never true  Within the past 12 months, the food you bought just didn't last and you didn't have money to get more.: Never true  Food insecurity resource given?: N/A  Does patient receive dialysis treatments?: No  Does patient have a history of substance abuse?: No  Does patient have a history of Mental Health Diagnosis?: No         Means of Transportation  Means of Transport to Appts[de-identified] Family transport (patient stated she was driving short distances prior to admission)  In the past 12 months, has lack of transportation kept you from medical appointments or from getting medications?: No  In the past 12 months, has lack of transportation kept you from meetings, work, or from getting things needed for daily living?: No  Was application for public transport provided?: N/A        DISCHARGE DETAILS:    Discharge planning discussed with[de-identified] patient        CM contacted family/caregiver?: No- see comments (patient declined at this time)      Patient discussed in huddle EGD today due to anemia.   Pending PT/OT evals for dc planning    CM to follow

## 2023-08-09 NOTE — ASSESSMENT & PLAN NOTE
Patient presenting with acute on chronic anemia and increasingly severe GERD symptoms for several months. Patient denies any overt bleeding and vital signs normal, consistent with chronic occult bleeding. Suspect secondary to gastritis or ulcer. Patient has prior diagnosis of H pylori; possible recurrence. Most recent hemoglobin 10.5 in April 2022; hemoglobin 6.9 at time of presentation. 1 unit PRBCs transfused at time of admission. Check vitamin/mineral status (eg, iron panel, B12, folate) and adjust supplementation accordingly. Treated as outpatient with pantoprazole 40 mg PO daily; switch to 40 mg IV BID pending further evaluation. Consult GI. Maintain NPO status to mitigate GI irritation and for potential EGD.    8/10 -EGD performed yesterday; appreciate GI recommendations noted to have gastritis and Cyndia Ego lesions in the colon advance to clear liquid diet and will continue PPI IV twice daily and likely transition to oral PPI twice daily tomorrow if she continues to do well. Monitor hemoglobin on morning labs; transfuse as needed.

## 2023-08-09 NOTE — PLAN OF CARE
Problem: PHYSICAL THERAPY ADULT  Goal: Performs mobility at highest level of function for planned discharge setting. See evaluation for individualized goals. Description: Treatment/Interventions: Functional transfer training, LE strengthening/ROM, Elevations, Therapeutic exercise, Endurance training, Bed mobility, Gait training          See flowsheet documentation for full assessment, interventions and recommendations. Note: Prognosis: Good  Problem List: Decreased strength, Decreased endurance, Impaired balance, Decreased mobility  Assessment: Patient is a 79 y.o. female evaluated by Physical Therapy s/p admit to [address] [place] [address] on 8/8/2023 with admitting diagnosis of: Chest pain, Abdominal pain, History of Helicobacter pylori infection, Symptomatic anemia, Hiatal hernia with GERD without esophagitis, and principal problem of: Symptomatic anemia. PT was consulted to assess patient's functional mobility and discharge needs. Ordered are PT Evaluation and treatment with activity level of: out of bed to chair. Comorbidities affecting patient's physical performance at time of assessment include: arthritis, DM, HTN, hiatal hernia, cancer, CKD, osteoarthritis. Personal factors affecting the patient at time of IE include: lives in 2 story home, ambulating with assistive device, inability to navigate community distances, history of fall(s) and inability/difficulty performing IADLs. Please locate objective findings from PT assessment regarding body systems outlined above. Upon evaluation, pt able to perform all functional mobility with SUP, RW, and increased time. Occasional verbal cuing provided for safety awareness and sequencing. Pt initially ambulating without device, however demonstrating significant postural sway and lateral LOB. Once provided with RW, pt able to improve balance and gait pattern. Seated rest break then taken following 125' d/t fatigue. HR and SpO2 remained WFL on RA throughout.  The patient's AM-PAC Basic Mobility Inpatient Short Form Raw Score is 20. A Raw score of greater than 16 suggests the patient may benefit from discharge to home. Please also refer to the recommendation of the Physical Therapist for safe discharge planning. Co treatment with OT secondary to complex medical condition of pt, possible A of 2 required to achieve and maintain transitional movements, requiring the need of skilled therapeutic intervention of 2 therapists to achieve delivery of services. Pt will benefit from continued PT intervention during LOS to address current deficits, increase LOF, and facilitate safe d/c to next level of care when medically appropriate. D/c recommendation at this time is home with outpatient rehabilitation. PT Discharge Recommendation: Home with outpatient rehabilitation    See flowsheet documentation for full assessment.

## 2023-08-09 NOTE — PLAN OF CARE
Problem: PAIN - ADULT  Goal: Verbalizes/displays adequate comfort level or baseline comfort level  Description: Interventions:  - Encourage patient to monitor pain and request assistance  - Assess pain using appropriate pain scale  - Administer analgesics based on type and severity of pain and evaluate response  - Implement non-pharmacological measures as appropriate and evaluate response  - Consider cultural and social influences on pain and pain management  - Notify physician/advanced practitioner if interventions unsuccessful or patient reports new pain  Outcome: Progressing     Problem: INFECTION - ADULT  Goal: Absence or prevention of progression during hospitalization  Description: INTERVENTIONS:  - Assess and monitor for signs and symptoms of infection  - Monitor lab/diagnostic results  - Monitor all insertion sites, i.e. indwelling lines, tubes, and drains  - Monitor endotracheal if appropriate and nasal secretions for changes in amount and color  - [place] appropriate cooling/warming therapies per order  - Administer medications as ordered  - Instruct and encourage patient and family to use good hand hygiene technique  - Identify and instruct in appropriate isolation precautions for identified infection/condition  Outcome: Progressing  Goal: Absence of fever/infection during neutropenic period  Description: INTERVENTIONS:  - Monitor WBC    Outcome: Progressing     Problem: SAFETY ADULT  Goal: Patient will remain free of falls  Description: INTERVENTIONS:  - Educate patient/family on patient safety including physical limitations  - Instruct patient to call for assistance with activity   - Consult OT/PT to assist with strengthening/mobility   - Keep Call bell within reach  - Keep bed low and locked with side rails adjusted as appropriate  - Keep care items and personal belongings within reach  - Initiate and maintain comfort rounds  - Make Fall Risk Sign visible to staff  - Offer Toileting every 2 Hours, in advance of need  - Initiate/Maintain Bed/Chair alarm  - Obtain necessary fall risk management equipment  - Apply yellow socks and bracelet for high fall risk patients  - Consider moving patient to room near nurses station  Outcome: Progressing  Goal: Maintain or return to baseline ADL function  Description: INTERVENTIONS:  -  Assess patient's ability to carry out ADLs; assess patient's baseline for ADL function and identify physical deficits which impact ability to perform ADLs (bathing, care of mouth/teeth, toileting, grooming, dressing, etc.)  - Assess/evaluate cause of self-care deficits   - Assess range of motion  - Assess patient's mobility; develop plan if impaired  - Assess patient's need for assistive devices and provide as appropriate  - Encourage maximum independence but intervene and supervise when necessary  - Involve family in performance of ADLs  - Assess for home care needs following discharge   - Consider OT consult to assist with ADL evaluation and planning for discharge  - Provide patient education as appropriate  Outcome: Progressing  Goal: Maintains/Returns to pre admission functional level  Description: INTERVENTIONS:  - Perform BMAT or MOVE assessment daily.   - Set and communicate daily mobility goal to care team and patient/family/caregiver. - Collaborate with rehabilitation services on mobility goals if consulted  - Perform Range of Motion 3 times a day. - Reposition patient every 2 hours.   - Dangle patient 3 times a day  - Stand patient 3 times a day  - Ambulate patient 3 times a day  - Out of bed to chair 3 times a day   - Out of bed for meals 3 times a day  - Out of bed for toileting  - Record patient progress and toleration of activity level   Outcome: Progressing     Problem: DISCHARGE PLANNING  Goal: Discharge to home or other facility with appropriate resources  Description: INTERVENTIONS:  - Identify barriers to discharge w/patient and caregiver  - Arrange for needed discharge resources and transportation as appropriate  - Identify discharge learning needs (meds, wound care, etc.)  - Arrange for interpretive services to assist at discharge as needed  - Refer to Case Management Department for coordinating discharge planning if the patient needs post-hospital services based on physician/advanced practitioner order or complex needs related to functional status, cognitive ability, or social support system  Outcome: Progressing     Problem: Knowledge Deficit  Goal: Patient/family/caregiver demonstrates understanding of disease process, treatment plan, medications, and discharge instructions  Description: Complete learning assessment and assess knowledge base.   Interventions:  - Provide teaching at level of understanding  - Provide teaching via preferred learning methods  Outcome: Progressing

## 2023-08-09 NOTE — UTILIZATION REVIEW
NOTIFICATION OF INPATIENT ADMISSION   AUTHORIZATION REQUEST   SERVICING FACILITY:   [address] [place] [address] [address]  [address]  Tax ID:  [id number]  NPI: [id number] ATTENDING PROVIDER:  Attending Name and NPI#: Mary Lopes [[id number]]  Address: [address] [place] [address]  Phone: [phone]     ADMISSION INFORMATION:  Place of Service: Inpatient [address] [facility]  Place of Service Code: 21  Inpatient Admission Date/Time: 8/9/23 12:00 AM  Discharge Date/Time: No discharge date for patient encounter. Admitting Diagnosis Code/Description:  Chest pain [R07.9]  Abdominal pain [E21.4]  History of Helicobacter pylori infection [Z86.19]  Symptomatic anemia [D64.9]  Hiatal hernia with GERD without esophagitis [K44.9, K21.9]     UTILIZATION REVIEW CONTACT:  Rosibel Harris, Utilization   Network Utilization Review Department  Phone: [phone]  Fax [phone]  Email: [email or web address]  Contact for approvals/pending authorizations, clinical reviews, and discharge. PHYSICIAN ADVISORY SERVICES:  Medical Necessity Denial & [id number]-Kqma Review  Phone: [phone]  Fax: [phone]  Email: [email or web address]

## 2023-08-09 NOTE — ASSESSMENT & PLAN NOTE
D-dimer (1.2) mildly elevated at time of presentation. Non-specific finding. CT chest negative for PE and patient has no symptoms/signs of DVT. No further evaluation at this time.

## 2023-08-09 NOTE — PLAN OF CARE
Problem: PAIN - ADULT  Goal: Verbalizes/displays adequate comfort level or baseline comfort level  Description: Interventions:  - Encourage patient to monitor pain and request assistance  - Assess pain using appropriate pain scale  - Administer analgesics based on type and severity of pain and evaluate response  - Implement non-pharmacological measures as appropriate and evaluate response  - Consider cultural and social influences on pain and pain management  - Notify physician/advanced practitioner if interventions unsuccessful or patient reports new pain  Outcome: Progressing     Problem: INFECTION - ADULT  Goal: Absence or prevention of progression during hospitalization  Description: INTERVENTIONS:  - Assess and monitor for signs and symptoms of infection  - Monitor lab/diagnostic results  - Monitor all insertion sites, i.e. indwelling lines, tubes, and drains  - Monitor endotracheal if appropriate and nasal secretions for changes in amount and color  - [place] appropriate cooling/warming therapies per order  - Administer medications as ordered  - Instruct and encourage patient and family to use good hand hygiene technique  - Identify and instruct in appropriate isolation precautions for identified infection/condition  Outcome: Progressing  Goal: Absence of fever/infection during neutropenic period  Description: INTERVENTIONS:  - Monitor WBC    Outcome: Progressing     Problem: SAFETY ADULT  Goal: Patient will remain free of falls  Description: INTERVENTIONS:  - Educate patient/family on patient safety including physical limitations  - Instruct patient to call for assistance with activity   - Consult OT/PT to assist with strengthening/mobility   - Keep Call bell within reach  - Keep bed low and locked with side rails adjusted as appropriate  - Keep care items and personal belongings within reach  - Initiate and maintain comfort rounds  - Make Fall Risk Sign visible to staff  - Offer Toileting every 2 Hours, in advance of need  - Initiate/Maintain bedalarm  - Obtain necessary fall risk management equipment: alarm  - Apply yellow socks and bracelet for high fall risk patients  - Consider moving patient to room near nurses station  Outcome: Progressing  Goal: Maintain or return to baseline ADL function  Description: INTERVENTIONS:  -  Assess patient's ability to carry out ADLs; assess patient's baseline for ADL function and identify physical deficits which impact ability to perform ADLs (bathing, care of mouth/teeth, toileting, grooming, dressing, etc.)  - Assess/evaluate cause of self-care deficits   - Assess range of motion  - Assess patient's mobility; develop plan if impaired  - Assess patient's need for assistive devices and provide as appropriate  - Encourage maximum independence but intervene and supervise when necessary  - Involve family in performance of ADLs  - Assess for home care needs following discharge   - Consider OT consult to assist with ADL evaluation and planning for discharge  - Provide patient education as appropriate  Outcome: Progressing  Goal: Maintains/Returns to pre admission functional level  Description: INTERVENTIONS:  - Perform BMAT or MOVE assessment daily.   - Set and communicate daily mobility goal to care team and patient/family/caregiver. - Collaborate with rehabilitation services on mobility goals if consulted  - Perform Range of Motion 3 times a day. - Reposition patient every 2 hours.   - Dangle patient 3 times a day  - Stand patient 3 times a day  - Ambulate patient 3 times a day  - Out of bed to chair 3 times a day   - Out of bed for meals 3 times a day  - Out of bed for toileting  - Record patient progress and toleration of activity level   Outcome: Progressing     Problem: DISCHARGE PLANNING  Goal: Discharge to home or other facility with appropriate resources  Description: INTERVENTIONS:  - Identify barriers to discharge w/patient and caregiver  - Arrange for needed discharge resources and transportation as appropriate  - Identify discharge learning needs (meds, wound care, etc.)  - Arrange for interpretive services to assist at discharge as needed  - Refer to Case Management Department for coordinating discharge planning if the patient needs post-hospital services based on physician/advanced practitioner order or complex needs related to functional status, cognitive ability, or social support system  Outcome: Progressing     Problem: Knowledge Deficit  Goal: Patient/family/caregiver demonstrates understanding of disease process, treatment plan, medications, and discharge instructions  Description: Complete learning assessment and assess knowledge base.   Interventions:  - Provide teaching at level of understanding  - Provide teaching via preferred learning methods  Outcome: Progressing

## 2023-08-09 NOTE — ANESTHESIA POSTPROCEDURE EVALUATION
Post-Op Assessment Note    CV Status:  Stable  Pain Score: 0    Pain management: adequate     Mental Status:  Sleepy and arousable   Hydration Status:  Euvolemic   PONV Controlled:  Controlled   Airway Patency:  Patent      Post Op Vitals Reviewed: Yes      Staff: Anesthesiologist, CRNA         No notable events documented.     BP 92/51 (08/09/23 1547)    Temp (!) 97 °F (36.1 °C) (08/09/23 1547)    Pulse 63 (08/09/23 1547)   Resp 21 (08/09/23 1547)    SpO2 97 % (08/09/23 1547)

## 2023-08-09 NOTE — OCCUPATIONAL THERAPY NOTE
Occupational Therapy Evaluation     Patient Name: Kaia Reveles  JWEJY'Y Date: 8/9/2023  Problem List  Principal Problem:    Symptomatic anemia  Active Problems:    Dyslipidemia    Type 2 diabetes mellitus with stage 3b chronic kidney disease, without long-term current use of insulin (HCC)    Acquired hypothyroidism    Hiatal hernia with GERD without esophagitis    Generalized osteoarthritis    History of Helicobacter pylori infection    Acquired renal cyst of left kidney    Elevated d-dimer    Past Medical History  Past Medical History:   Diagnosis Date    Acquired renal cyst of left kidney 8/9/2023    Arthritis     Breast cancer ([address])     Cancer ([address])     Diabetes mellitus ([address])     Generalized osteoarthritis 8/9/2023    Hiatal hernia     Hiatal hernia with GERD without esophagitis 4/16/2021    History of Helicobacter pylori infection 8/9/2023    Hypertension     Stage 3b chronic kidney disease ([address]) 8/9/2023    Type 2 diabetes mellitus with stage 3b chronic kidney disease, without long-term current use of insulin ([address]) 6/30/2021     Past Surgical History  Past Surgical History:   Procedure Laterality Date    ABDOMINAL SURGERY      states in maybe 41s-51s; "my intestines were wrapped around each other"    APPENDECTOMY      CHOLECYSTECTOMY      TONSILLECTOMY             08/09/23 1428   OT Last Visit   OT Visit Date 08/09/23   Note Type   Note type Evaluation   Pain Assessment   Pain Score No Pain   Restrictions/Precautions   Weight Bearing Precautions Per Order No   Other Precautions Bed Alarm; Fall Risk   Home Living   Type of [address] [place] [address] Two level; Other (Comment); Laundry in basement;Bed/bath upstairs  (0 ACE)   Bathroom Shower/Tub Tub/shower unit   Bathroom Toilet Standard   Bathroom Equipment Grab bars in shower;Grab bars around toilet;Commode   [address] Walker;Cane;Grab bars   Additional Comments pt reports use of no device at baseline during functional mobility   Prior Function   Level of [place] Independent with ADLs; Independent with functional mobility; Needs assistance with IADLS   Lives With Son   Receives Help From Family   IADLs Independent with driving   Falls in the last 6 months 1 to 4   Vocational Retired   Subjective   Subjective "out of all the hospitals, this is the one"   ADL   Where Assessed Other (Comment)  (bathroom)   Grooming Assistance 5  Supervision/Setup   LB Dressing Assistance 5  Supervision/Setup   Toileting Assistance  5  Supervision/Setup   Additional Comments pt performs ADLs with (S) level with no significant difficulties   Bed Mobility   Supine to Sit 5  Supervision   Additional items Increased time required;Verbal cues; Bedrails   Sit to Supine 5  Supervision   Additional items Increased time required;Verbal cues; Bedrails   Additional Comments pt on RA during session; SpO2 WFL with no complaints of SOB   Transfers   Sit to Stand 5  Supervision   Additional items Increased time required;Verbal cues   Stand to Sit 5  Supervision   Additional items Increased time required;Verbal cues   Toilet transfer 5  Supervision   Additional items Increased time required;Standard toilet;Verbal cues   Additional Comments pt performs functional transfers with and without RW during session; RW provided for increased stability due to multiple LOB and instability during mobility   Functional Mobility   Functional Mobility 5  Supervision   Additional Comments pt performs functional mobility in hallway ~150ft with no significant LOB, however several minor LOB and instability; provided with RW after ~50ft for increased stability   Additional items Rolling walker   Balance   Static Sitting Good   Dynamic Sitting Good   Static Standing Fair +   Dynamic Standing Fair   Ambulatory Fair -   Activity Tolerance   Activity Tolerance Patient limited by fatigue   RUE Assessment   RUE Assessment WFL   LUE Assessment   LUE Assessment WFL   Hand Function   Gross Motor Coordination Functional   Fine Motor Coordination Functional   Sensation   Light Touch No apparent deficits   Sharp/Dull No apparent deficits   Psychosocial   Psychosocial (WDL) WDL   Cognition   Overall Cognitive Status WFL   Arousal/Participation Alert   Attention Within functional limits   Orientation Level Oriented X4   Memory Within functional limits   Following Commands Follows all commands and directions without difficulty   Assessment   Limitation Decreased ADL status; Decreased endurance;Decreased self-care trans;Decreased high-level ADLs; Decreased Safe judgement during ADL;Decreased UE strength   Assessment Pt is a 79 y.o. female seen for OT evaluation s/p admit to [facility] on 8/8/2023 w/ Symptomatic anemia. Comorbidities affecting pt's functional performance at time of assessment include: arthritis, DM, HTN, hernia, CA, CKD, osteoarthritis. Personal factors affecting pt at time of IE include:difficulty performing ADLS, difficulty performing IADLS , limited insight into deficits, decreased initiation and engagement  and health management . Prior to admission, pt was (I) with ADLs and (A) with IADLs with use of no device during mobility. Upon evaluation: Pt requires (S) level with use of RW during mobility 2* the following deficits impacting occupational performance: weakness, decreased strength, decreased balance, decreased tolerance, impaired initiation and decreased safety awareness. Pt to benefit from continued skilled OT tx while in the hospital to address deficits as defined above and maximize level of functional independence w ADL's and functional mobility. Occupational Performance areas to address include: grooming, bathing/shower, toilet hygiene, dressing, functional mobility, community mobility and clothing management. The patient's raw score on the -PAC Daily Activity Inpatient Short Form is 21. A raw score of greater than or equal to 19 suggests the patient may benefit from discharge to home. Please refer to the recommendation of the Occupational Therapist for safe discharge planning. Pt benefited from co-evaluation of skilled OT and PT therapists in order to most appropriately address functional deficits d/t extensive assistance required for safe functional mobility, decreased activity tolerance, and regression from functioning level prior to admission and/or onset of present illness. OT/PT objectives were addressed separately; please see PT note for specific goal areas targeted. Goals   Patient Goals to go home   Short Term Goal  pt will perform UE strengthening exercises   Long Term Goal #1 pt will demonstrate toilet transfers and hygiene at (I) level   Long Term Goal #2 pt will demonstrate functional mobility with RW at mod (I) level   Long Term Goal pt will demonstrate UB/LB bathing and grooming tasks at (I) level   Plan   Treatment Interventions ADL retraining;Functional transfer training;UE strengthening/ROM; Endurance training;Patient/family training;Equipment evaluation/education; Activityengagement   Goal Expiration Date 08/23/23   OT Frequency 3-5x/wk   Recommendation   OT Discharge Recommendation Home with outpatient rehabilitation   Additional Comments  OP PT services   AM-PAC Daily Activity Inpatient   Lower Body Dressing 3   Bathing 3   Toileting 3   Upper Body Dressing 4   Grooming 4   Eating 4   Daily Activity Raw Score 21   Daily Activity Standardized Score (Calc for Raw Score >=11) 44.27   AM-PAC Applied Cognition Inpatient   Following a Speech/Presentation 4   Understanding Ordinary Conversation 4   Taking Medications 4   Remembering Where Things Are Placed or Put Away 3   Remembering List of 4-5 Errands 3   Taking Care of Complicated Tasks 3   Applied Cognition Raw Score 21   Applied Cognition Standardized Score 44.3

## 2023-08-09 NOTE — PHYSICAL THERAPY NOTE
Physical Therapy Evaluation    Patient Name: Nataly Jolley    YQFTW'H Date: 8/9/2023     Problem List  Principal Problem:    Symptomatic anemia  Active Problems:    Dyslipidemia    Type 2 diabetes mellitus with stage 3b chronic kidney disease, without long-term current use of insulin (HCC)    Acquired hypothyroidism    Hiatal hernia with GERD without esophagitis    Generalized osteoarthritis    History of Helicobacter pylori infection    Acquired renal cyst of left kidney    Elevated d-dimer       Past Medical History  Past Medical History:   Diagnosis Date    Acquired renal cyst of left kidney 8/9/2023    Arthritis     Breast cancer ([address])     Cancer ([address])     Diabetes mellitus ([address])     Generalized osteoarthritis 8/9/2023    Hiatal hernia     Hiatal hernia with GERD without esophagitis 4/16/2021    History of Helicobacter pylori infection 8/9/2023    Hypertension     Stage 3b chronic kidney disease ([address]) 8/9/2023    Type 2 diabetes mellitus with stage 3b chronic kidney disease, without long-term current use of insulin ([address]) 6/30/2021        Past Surgical History  Past Surgical History:   Procedure Laterality Date    ABDOMINAL SURGERY      states in maybe 41s-51s; "my intestines were wrapped around each other"    APPENDECTOMY      CHOLECYSTECTOMY      TONSILLECTOMY             08/09/23 1429   PT Last Visit   PT Visit Date 08/09/23   Note Type   Note type Evaluation   Pain Assessment   Pain Assessment Tool 0-10   Pain Score No Pain   Restrictions/Precautions   Weight Bearing Precautions Per Order No   Other Precautions Fall Risk;Multiple lines; Bed Alarm   Home Living   Type of [address] [place] [address] Layout Two level  (0 ACE)   Bathroom Shower/Tub Tub/shower unit   Bathroom Toilet Standard   Bathroom Equipment Grab bars around toilet;Grab bars in shower;Commode   [address]   Additional Comments pt denies use of DME at baseline   Prior Function   Level of [place] Independent with ADLs; Independent with functional mobility; Independent with IADLS   Lives With Son   Receives Help From Family   IADLs Independent with driving   Falls in the last 6 months 1 to 4   Vocational Retired   General   Family/Caregiver Present No   Cognition   Overall Cognitive Status WFL   Arousal/Participation Alert   Orientation Level Oriented X4   Following Commands Follows all commands and directions without difficulty   Subjective   Subjective "My procedure's at 3"   RLE Assessment   RLE Assessment WFL  (assessed functionally)   LLE Assessment   LLE Assessment WFL  (assessed functionally)   Bed Mobility   Supine to Sit 5  Supervision   Additional items Increased time required;Verbal cues   Sit to Supine 5  Supervision   Additional items Increased time required;Verbal cues   Transfers   Sit to Stand 5  Supervision   Additional items Increased time required;Verbal cues   Stand to Sit 5  Supervision   Additional items Increased time required;Verbal cues   Toilet transfer 5  Supervision   Additional items Increased time required;Verbal cues;Standard toilet   Additional Comments performed with and without RW   Ambulation/Elevation   Gait pattern Short stride; Foward flexed; Inconsistent donny;Scissoring;Narrow PAWAN; Improper Weight shift   Gait Assistance 5  Supervision   Additional items Verbal cues   Assistive Device Rolling walker   Distance 125'   Balance   Static Sitting Good   Dynamic Sitting Good   Static Standing Fair   Dynamic Standing [address] [facility] -  (with RW)   Endurance Deficit   Endurance Deficit Yes   Endurance Deficit Description pt reports fatigue with increased ambulation   Activity Tolerance   Activity Tolerance Patient limited by fatigue   Assessment   Prognosis Good   Problem List Decreased strength;Decreased endurance; Impaired balance;Decreased mobility   Assessment Patient is a 79 y.o. female evaluated by Physical Therapy s/p admit to [address] [place] [address] on 8/8/2023 with admitting diagnosis of: Chest pain, Abdominal pain, History of Helicobacter pylori infection, Symptomatic anemia, Hiatal hernia with GERD without esophagitis, and principal problem of: Symptomatic anemia. PT was consulted to assess patient's functional mobility and discharge needs. Ordered are PT Evaluation and treatment with activity level of: out of bed to chair. Comorbidities affecting patient's physical performance at time of assessment include: arthritis, DM, HTN, hiatal hernia, cancer, CKD, osteoarthritis. Personal factors affecting the patient at time of IE include: lives in 2 story home, ambulating with assistive device, inability to navigate community distances, history of fall(s) and inability/difficulty performing IADLs. Please locate objective findings from PT assessment regarding body systems outlined above. Upon evaluation, pt able to perform all functional mobility with SUP, RW, and increased time. Occasional verbal cuing provided for safety awareness and sequencing. Pt initially ambulating without device, however demonstrating significant postural sway and lateral LOB. Once provided with RW, pt able to improve balance and gait pattern. Seated rest break then taken following 125' d/t fatigue. HR and SpO2 remained WFL on RA throughout. The patient's AM-PAC Basic Mobility Inpatient Short Form Raw Score is 20. A Raw score of greater than 16 suggests the patient may benefit from discharge to home. Please also refer to the recommendation of the Physical Therapist for safe discharge planning. Co treatment with OT secondary to complex medical condition of pt, possible A of 2 required to achieve and maintain transitional movements, requiring the need of skilled therapeutic intervention of 2 therapists to achieve delivery of services.  Pt will benefit from continued PT intervention during LOS to address current deficits, increase LOF, and facilitate safe d/c to next level of care when medically appropriate. D/c recommendation at this time is home with outpatient rehabilitation. Goals   Patient Goals to go home soon   LTG Expiration Date 08/23/23   Long Term Goal #1 Pt will participate in B LE strengthening exercises to facilitate improved functional activity tolerance. Pt will perform all functional transfers and bed mobility mod(I) with good safety awareness. Pt will ambulate 250' mod(I) with LRAD while maintaining good functional dynamic balance. Pt will ascend/descend a FFOS with HR and SUP to reflect the ability to safely navigate the home. Plan   Treatment/Interventions Functional transfer training;LE strengthening/ROM; Elevations; Therapeutic exercise; Endurance training;Bed mobility;Gait training   PT Frequency 3-5x/wk   Recommendation   PT Discharge Recommendation Home with outpatient rehabilitation   AM-PAC Basic Mobility Inpatient   Turning in Flat Bed Without Bedrails 4   Lying on Back to Sitting on Edge of Flat Bed Without Bedrails 4   Moving Bed to Chair 3   Standing Up From Chair Using Arms 3   Walk in Room 3   Climb 3-5 Stairs With Railing 3   Basic Mobility Inpatient Raw Score 20   Basic Mobility Standardized Score 43.99   Highest Level Of Mobility   JH-HLM Goal 6: Walk 10 steps or more   JH-HLM Achieved 7: Walk 25 feet or more   End of Consult   Patient Position at End of Consult Supine; All needs within reach

## 2023-08-09 NOTE — H&P
H&P Exam - Celi Cano 79 y.o. female MRN: [id number]    Unit/Bed#: 523-13 Encounter: [id number]      Assessment/Plan     * Symptomatic anemia  Assessment & Plan  Patient presenting with acute on chronic anemia and increasingly severe GERD symptoms for several months. Patient denies any overt bleeding and vital signs normal, consistent with chronic occult bleeding. Suspect secondary to gastritis or ulcer. Patient has prior diagnosis of H pylori; possible recurrence. Most recent hemoglobin 10.5 in April 2022; hemoglobin 6.9 at time of presentation. 1 unit PRBCs transfused at time of admission. Monitor clinical condition and serial labs with H/H every 6 hours. Check vitamin/mineral status (eg, iron panel, B12, folate) and adjust supplementation accordingly. Treated as outpatient with pantoprazole 40 mg PO daily; switch to 40 mg IV BID pending further evaluation. Consult GI. Maintain NPO status to mitigate GI irritation and for potential EGD. Elevated d-dimer  Assessment & Plan  D-dimer (1.2) mildly elevated at time of presentation. Non-specific finding. CT chest negative for PE and patient has no symptoms/signs of DVT. No further evaluation at this time. Acquired renal cyst of left kidney  Assessment & Plan  Chronic condition. Visible on prior imaging for several years, though not as well due to lack of contrast with those studies. Asymptomatic simple cyst approximately 2.6 cm diameter. No hematuria or other apparent complications. No further evaluation at this time; follow up with primary care as needed for development of any flank pain or urinary symptoms. History of Helicobacter pylori infection  Assessment & Plan  History of H pylori diagnosed by EGD in 2021. Treated with triple therapy regimen. Suspect recurrence based on presentation. Consult GI.       Generalized osteoarthritis  Assessment & Plan  Chronic stable condition, primarily involving bilateral hands, knees, ankles, and feet.  Treated as outpatient with voltaren gel as needed (at least daily) and norco 5-325 mg as needed (typically BID). Continue outpatient medication regimen with addition of tylenol as needed for mild pain. Stage 3b chronic kidney disease [facility])  Assessment & Plan  Lab Results   Component Value Date    EGFR 50 08/09/2023    EGFR 46 08/08/2023    EGFR 40 03/15/2022    CREATININE 1.03 08/09/2023    CREATININE 1.09 08/08/2023    CREATININE 1.23 03/15/2022     Chronic stable condition. Patient at baseline creatinine 1.09 at time of presentation. Monitor renal function and fluid/electrolyte status. Minimize nephrotoxins. Hiatal hernia with GERD without esophagitis  Assessment & Plan  Chronic condition. Symptoms worsening over past few months despite multiple medication changes. Patient has history of H pylori detected on EGD in 2021. 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 Currently treated as outpatient with pantoprazole 40 mg daily and sucralfate 1 gram BID. Continue sucralfate; switch pantoprazole to IV form BID due to severe anemia. Consult GI. Acquired hypothyroidism  Assessment & Plan  Chronic stable condition. Treated as outpatient with levothyroxine 75 mcg daily. Continue current dose for now. Check TSH with morning labs and adjust dose accordingly. Type 2 diabetes mellitus with stage 3b chronic kidney disease, without long-term current use of insulin [facility])  Assessment & Plan  Lab Results   Component Value Date    HGBA1C 7.1 (H) 04/14/2022       Recent Labs     08/09/23  0157 08/09/23  0549   POCGLU 108 100       Blood Sugar Average: Last 72 hrs:  (P) 104     Last hemoglobin A1c 7.1 in April 2022. Treated as outpatient with diet/activity only; no medications. Check current A1c and initiate insulin protocol pending further evaluation. CKD 3B is chronic and stable.   Patient at baseline creatinine 1.09 at time of presentation. Monitor renal function and fluid/electrolyte status. Minimize nephrotoxins. Dyslipidemia  Assessment & Plan  Chronic stable condition. Treated as outpatient with atorvastatin 20 mg daily. Continue outpatient medication regimen. History of Present Illness     HPI:  Yvon Wild is a 79 y.o. female who presents to ER for multiple complaints over past few months; she suspects they are related to her hiatal hernia and chronic anemia. Patient reports longstanding GERD due to hiatal hernia. GERD symptoms include epigastric "heartburn" pain radiating to chest and back, plus decreased appetite. Steadily worsening despite multiple medication changes, so patient went back to her usual regimen. Currently treated with pantoprazole 40 mg daily and sucralfate 1 gram BID. Similar to previous episode in 2021 when she was diagnosed with H pylori by EGD. Patient reports anemia symptoms of generalized weakness/malaise, decreased energy/stamina, cold intolerance, dyspnea with exertion, lightheadedness, headaches, and pica (ice). She has required iron infusions and blood transfusions in the past.  Currently treated with daily iron and B12 supplements. She has not had labs in almost 18 months. No recent overt bleeding. PCP: Konstantin Stanley MD    Review of Systems   All other systems reviewed and are negative.       Historical Information   Past Medical History:   Diagnosis Date   • Arthritis    • Breast cancer ([facility])    • Cancer ([address])    • Diabetes mellitus ([address])    • Hiatal hernia    • Hiatal hernia with GERD without esophagitis 4/16/2021   • History of Helicobacter pylori infection 8/9/2023   • Hypertension    • Stage 3b chronic kidney disease ([address]) 8/9/2023     Past Surgical History:   Procedure Laterality Date   • ABDOMINAL SURGERY      states in maybe 40s-50s; "my intestines were wrapped around each other"   • APPENDECTOMY     • CHOLECYSTECTOMY     • TONSILLECTOMY       Social History   Social History     Substance and Sexual Activity   Alcohol Use Never     Social History     Substance and Sexual Activity   Drug Use Never     Social History     Tobacco Use   Smoking Status Never   Smokeless Tobacco Never     E-Cigarette/Vaping      E-Cigarette/Vaping Substances     Family History: non-contributory    Meds/Allergies   all medications and allergies reviewed  Allergies   Allergen Reactions   • Penicillins Other (See Comments), Shortness Of Breath and Throat Swelling     "dizzy, nauseous"   • Dye [Iodinated Contrast Media] Nausea Only   • Propoxyphene Nausea Only   • Latex Rash     Reviewed PDMP to confirm prescribing pattern for controlled substances. Objective   Vitals: Blood pressure 137/66, pulse 65, temperature 97.8 °F (36.6 °C), temperature source Temporal, resp. rate 16, height 5' (1.524 m), weight 63.4 kg (139 lb 12.4 oz), SpO2 97 %. No intake or output data in the 24 hours ending 08/09/23 0050    Invasive Devices     Peripheral Intravenous Line  Duration           Peripheral IV 08/08/23 Left Antecubital <1 day    Peripheral IV 08/08/23 Right Antecubital <1 day                Physical Exam  Vitals and nursing note reviewed. Constitutional:       General: She is not in acute distress. Appearance: She is well-developed and normal weight. She is not ill-appearing, toxic-appearing or diaphoretic. Comments: Elderly female lying in hospital bed. Alert and conversing easily. Appears mildly fatigued, but no significant distress. HENT:      Head: Normocephalic and atraumatic. Right Ear: External ear normal.      Left Ear: External ear normal.      Nose: Nose normal. No congestion or rhinorrhea. Mouth/Throat:      Mouth: Mucous membranes are moist.      Pharynx: Oropharynx is clear. No oropharyngeal exudate or posterior oropharyngeal erythema. Eyes:      General: No scleral icterus. Right eye: No discharge.          Left eye: No discharge. Pupils: Pupils are equal, round, and reactive to light. Neck:      Vascular: No JVD. Trachea: No tracheal deviation. Cardiovascular:      Rate and Rhythm: Normal rate and regular rhythm. Pulses: Normal pulses. Heart sounds: Normal heart sounds. No murmur heard. No friction rub. No gallop. Pulmonary:      Effort: Pulmonary effort is normal. No respiratory distress. Breath sounds: Normal breath sounds. No stridor. No wheezing, rhonchi or rales. Chest:      Chest wall: Deformity and tenderness present. Comments: Bony deformity/tenderness at left lower ribcage, chronic and stable per patient. Consistent with remote history of traumatic rib fracture as described. Abdominal:      General: Bowel sounds are normal. There is no distension. Palpations: Abdomen is soft. There is no mass. Tenderness: There is no abdominal tenderness. There is no right CVA tenderness, left CVA tenderness, guarding or rebound. Musculoskeletal:         General: No swelling, tenderness, deformity or signs of injury. Cervical back: Neck supple. No rigidity. No muscular tenderness. Right lower leg: No edema. Left lower leg: No edema. Lymphadenopathy:      Cervical: No cervical adenopathy. Skin:     General: Skin is warm and dry. Capillary Refill: Capillary refill takes less than 2 seconds. Coloration: Skin is pale. Skin is not jaundiced. Findings: No bruising, erythema, lesion or rash. Neurological:      Mental Status: She is alert and oriented to person, place, and time. GCS: GCS eye subscore is 4. GCS verbal subscore is 5. GCS motor subscore is 6. Cranial Nerves: No dysarthria or facial asymmetry. Sensory: No sensory deficit. Motor: No weakness, tremor, atrophy or abnormal muscle tone.    Psychiatric:         Attention and Perception: Attention and perception normal.         Mood and Affect: Mood and affect normal. Speech: Speech normal.         Behavior: Behavior normal.         Thought Content: Thought content normal.         Cognition and Memory: Cognition and memory normal.         Judgment: Judgment normal.       Lab Results: I have personally reviewed pertinent reports. Imaging: I have personally reviewed pertinent reports. and I have personally reviewed pertinent films in PACS  EKG, Pathology, and Other Studies: I have personally reviewed pertinent reports. and I have personally reviewed pertinent films in PACS    Code Status: Level 1 - Full Code  Advance Directive and Living Will:      Power of :    POLST:      Counseling / Coordination of Care  Greater than 75 minutes spent on patient care, including review of electronic record, interpreting results of studies, face-to-face with patient (ie, conducting history/physical exam and discussing diagnosis, prognosis, and plan of care), entering orders, coordinating care with other providers and staff, and completing documentation.

## 2023-08-09 NOTE — PLAN OF CARE
Problem: OCCUPATIONAL THERAPY ADULT  Goal: Performs self-care activities at highest level of function for planned discharge setting. See evaluation for individualized goals. Description: Treatment Interventions: ADL retraining, Functional transfer training, UE strengthening/ROM, Endurance training, Patient/family training, Equipment evaluation/education, Activityengagement          See flowsheet documentation for full assessment, interventions and recommendations. Note: Limitation: Decreased ADL status, Decreased endurance, Decreased self-care trans, Decreased high-level ADLs, Decreased Safe judgement during ADL, Decreased UE strength     Assessment: Pt is a 79 y.o. female seen for OT evaluation s/p admit to [facility] on 8/8/2023 w/ Symptomatic anemia. Comorbidities affecting pt's functional performance at time of assessment include: arthritis, DM, HTN, hernia, CA, CKD, osteoarthritis. Personal factors affecting pt at time of IE include:difficulty performing ADLS, difficulty performing IADLS , limited insight into deficits, decreased initiation and engagement  and health management . Prior to admission, pt was (I) with ADLs and (A) with IADLs with use of no device during mobility. Upon evaluation: Pt requires (S) level with use of RW during mobility 2* the following deficits impacting occupational performance: weakness, decreased strength, decreased balance, decreased tolerance, impaired initiation and decreased safety awareness. Pt to benefit from continued skilled OT tx while in the hospital to address deficits as defined above and maximize level of functional independence w ADL's and functional mobility. Occupational Performance areas to address include: grooming, bathing/shower, toilet hygiene, dressing, functional mobility, community mobility and clothing management. The patient's raw score on the -PAC Daily Activity Inpatient Short Form is 21.  A raw score of greater than or equal to 19 suggests the patient may benefit from discharge to home. Please refer to the recommendation of the Occupational Therapist for safe discharge planning. Pt benefited from co-evaluation of skilled OT and PT therapists in order to most appropriately address functional deficits d/t extensive assistance required for safe functional mobility, decreased activity tolerance, and regression from functioning level prior to admission and/or onset of present illness. OT/PT objectives were addressed separately; please see PT note for specific goal areas targeted.      OT Discharge Recommendation: Home with outpatient rehabilitation

## 2023-08-09 NOTE — UTILIZATION REVIEW
Initial Clinical Review    Admission: Date/Time/Statement:   Admission Orders (From admission, onward)     Ordered        08/09/23 0000  INPATIENT ADMISSION  Once                      Orders Placed This Encounter   Procedures   • INPATIENT ADMISSION     Standing Status:   Standing     Number of Occurrences:   1     Order Specific Question:   Level of Care     Answer:   Med Surg [16]     Order Specific Question:   Estimated length of stay     Answer:   More than 2 Midnights     Order Specific Question:   Certification     Answer:   I certify that inpatient services are medically necessary for this patient for a duration of greater than two midnights. See H&P and MD Progress Notes for additional information about the patient's course of treatment. ED Arrival Information     Expected   -    Arrival   8/8/2023 18:03    Acuity   Urgent            Means of arrival   Ambulance    Escorted by   [facility]  ([address])    Service   Hospitalist    Admission type   Emergency            Arrival complaint   Chest Pain           Chief Complaint   Patient presents with   • Chest Pain     L sided chest pain radiating into neck/jaw/back "for about a month"; states she was seen at urgent care yesterday and was told to come here but did not want to come       Initial Presentation: 79 y.o. female presents to ed from home for via ems evaluation and treatment of chest pain. Patient reports pain daily lasting 1-2 hours with radiation to the back and associated with nausea. She also reports burning pain. She reports a fall 2 days ago due to loss of balance. PMHX: DM, HTN, BREAST CANCER, H PYLORI    Clinical assessment significant for D dimer 1.20,  Hb 6.9, Hct 25, EKG nsr, no ischemic changes. Initially treated with iv zofran x2, iv mso4 x1. Admit to inpatient med surg for symptomatic anemia. Date: 8-9-23 Day 2: inpatient med surg  Transfuse 1U prbc. Iv protonix bid.  Consult GI for suspected gastritis or ulcer. NPO status to mitigate GI  irritation and potential EGD. Hb q6 hr.  Now 8.2. ED Triage Vitals   08/08/23 1933 08/08/23 1815 08/08/23 1815 08/08/23 1815 08/08/23 1815   98.2 °F (36.8 °C) 82 16 120/68 96 %      Temporal Monitor         Pain Score       5          08/09/23 59.5 kg (131 lb 2.8 oz)     Additional Vital Signs:     Date/  Time Temp Pulse Resp BP MAP SpO2 O2 Device   08/09/23 07:07:57 97.8 °F (36.6 °C) 65 16 139/67 91 97 % None (Room air)   08/09/23 02:11:26 97.3 °F (36.3 °C)   Abnormal  66 16 139/67 91 97 % None (Room air)   08/09/23 0202 -- -- -- -- -- -- None (Room air)   08/09/23 01:20:47 -- 80 -- 156/78 104 98 % --   08/09/23 00:55:35 98 °F (36.7 °C) 69 17 157/78 104 98 % --   08/09/23 0052 98 °F (36.7 °C) 68 18 157/78 -- 98 % None (Room air)   08/09/23 0035 97.8 °F (36.6 °C) 65 16 137/66 -- 97 % None (Room air)   08/09/23 0030 97.8 °F (36.6 °C) 63 16 129/63 -- 98 % None (Room air)   08/09/23 0025 98.1 °F (36.7 °C) 66 12 133/62 -- 97 % None (Room air)   08/09/23 0020 97.8 °F (36.6 °C) 63 16 145/65 -- 98 % None (Room air)   08/09/23 0015 97.9 °F (36.6 °C) 68 15 189/86  Abnormal  124 99 % None (Room air)   08/09/23 0000 -- 65 15 128/66 91 98 % None (Room air)   08/08/23 2330 -- 69 16 133/63 91 98 % None (Room air)   08/08/23 2245 -- 68 15 137/101  Abnormal  116 97 % None (Room air)   08/08/23 2204 -- 79 27   Abnormal  -- -- 97 % --   08/08/23 2000 -- 70 21 107/63 80 98 % None (Room air)   08/08/23 1933 98.2 °F (36.8 °C) -- -- -- -- -- --   08/08/23 1900 -- 74 18 120/55 78 96 % None (Room air)   08/08/23 1830 -- 79 12 112/59 83 97 % None (Room air)   08/08/23 1815 -- 82 16 120/68 -- 96 % None (Room air)     Pertinent Labs/Diagnostic Test Results:     CT head without contrast   Final  (08/08 2226)      No acute intracranial abnormality. CT cervical spine without contrast   Final  (08/08 2230)      No cervical spine fracture or traumatic malalignment.          PE Study with CT Abdomen and Pelvis with contrast   Final  (08/08 2357)      1. No evidence of acute pulmonary embolus, thoracic aortic aneurysm or dissection. No acute cardiopulmonary process. 2. No acute intra-abdominal or pelvic process.            Results from last 7 days   Lab Units 08/08/23 1933   SARS-COV-2  Negative     Results from last 7 days   Lab Units 08/09/23 0412 08/08/23 1933   WBC Thousand/uL 5.54 5.02   HEMOGLOBIN g/dL 8.2* 6.9*   HEMATOCRIT % 28.2* 25.0*   PLATELETS Thousands/uL 336 349   NEUTROS ABS Thousands/µL 3.57 3.19         Results from last 7 days   Lab Units 08/09/23 0412 08/08/23 1933   SODIUM mmol/L 138 139   POTASSIUM mmol/L 4.7 4.1   CHLORIDE mmol/L 104 104   CO2 mmol/L 29 28   ANION GAP mmol/L 5 7   BUN mg/dL 9 10   CREATININE mg/dL 1.03 1.09   EGFR ml/min/1.73sq m 50 46   CALCIUM mg/dL 8.9 8.6   MAGNESIUM mg/dL 2.1 2.0   PHOSPHORUS mg/dL 3.6  --      Results from last 7 days   Lab Units 08/09/23 0412 08/08/23 1933   AST U/L 13 13   ALT U/L 8 8   ALK PHOS U/L 50 49   TOTAL PROTEIN g/dL 6.0* 6.2*   ALBUMIN g/dL 3.5 3.7   TOTAL BILIRUBIN mg/dL 1.09* 0.26   BILIRUBIN DIRECT mg/dL  --  0.04     Results from last 7 days   Lab Units 08/09/23  0549 08/09/23  0157   POC GLUCOSE mg/dl 100 108     Results from last 7 days   Lab Units 08/09/23  0412 08/08/23 1933   GLUCOSE RANDOM mg/dL 96 128       Results from last 7 days   Lab Units 08/08/23 1933   D-DIMER QUANTITATIVE ug/ml FEU 1.20*     Results from last 7 days   Lab Units 08/09/23 0412 08/08/23 1933   PROTIME seconds 14.1 14.1   INR  1.06 1.07   PTT seconds  --  32     Results from last 7 days   Lab Units 08/09/23 0412   TSH 3RD GENERATON uIU/mL 5.758*         Results from last 7 days   Lab Units 08/08/23  1933   LACTIC ACID mmol/L 0.9       Results from last 7 days   Lab Units 08/09/23  0633   UNIT PRODUCT CODE  [id number]   UNIT NUMBER  [id number]   [facility]   UNITRH  POS   CROSSMATCH  Compatible   UNIT DISPENSE STATUS  Presumed Trans   UNIT PRODUCT VOL ml 300         Results from last 7 days   Lab Units 08/08/23  1933   LIPASE u/L 47       Results from last 7 days   Lab Units 08/08/23  2104   CLARITY UA  Clear   COLOR UA  Light Yellow   SPEC GRAV UA  1.015   PH UA  7.0   GLUCOSE UA mg/dl Negative   KETONES UA mg/dl Negative   BLOOD UA  Negative   PROTEIN UA mg/dl Negative   NITRITE UA  Negative   BILIRUBIN UA  Negative   UROBILINOGEN UA E.U./dl 0.2   LEUKOCYTES UA  Trace*   WBC UA /hpf 2-4   RBC UA /hpf 0-1   BACTERIA UA /hpf None Seen   EPITHELIAL CELLS WET PREP /hpf None Seen     Results from last 7 days   Lab Units 08/08/23  1933   INFLUENZA A PCR  Negative   INFLUENZA B PCR  Negative   RSV PCR  Negative       ED Treatment:   Medication Administration from 08/08/2023 1803 to 08/09/2023 0049       Date/Time Order Dose Route Action     08/08/2023 1938 EDT ondansetron (ZOFRAN) injection 4 mg 4 mg Intravenous Given     08/08/2023 2121 EDT ondansetron (ZOFRAN) injection 4 mg 4 mg Intravenous Given     08/09/2023 0010 EDT morphine injection 2 mg 2 mg Intravenous Given        Past Medical History:   Diagnosis Date   • Acquired renal cyst of left kidney 8/9/2023   • Arthritis    • Breast cancer ([address])    • Cancer ([address])    • Diabetes mellitus ([address])    • Generalized osteoarthritis 8/9/2023   • Hiatal hernia    • Hiatal hernia with GERD without esophagitis 4/16/2021   • History of Helicobacter pylori infection 8/9/2023   • Hypertension    • Stage 3b chronic kidney disease ([address]) 8/9/2023   • Type 2 diabetes mellitus with stage 3b chronic kidney disease, without long-term current use of insulin ([address]) 6/30/2021     Present on Admission:  • Symptomatic anemia  • Hiatal hernia with GERD without esophagitis  • Type 2 diabetes mellitus with stage 3b chronic kidney disease, without long-term current use of insulin ([address])  • Acquired hypothyroidism  • Dyslipidemia  • Generalized osteoarthritis  • History of Helicobacter pylori infection  • Acquired renal cyst of left kidney  • Elevated d-dimer      Admitting Diagnosis:     Chest pain [R07.9]  Abdominal pain [A30.0]  History of Helicobacter pylori infection [Z86.19]  Symptomatic anemia [D64.9]  Hiatal hernia with GERD without esophagitis [K44.9, K21.9]    Age/Sex: 79 y.o. female    Scheduled Medications:    atorvastatin, 20 mg, Oral, Daily  vitamin B-12, 1,000 mcg, Oral, QAM  ferrous sulfate, 325 mg, Oral, Daily With Breakfast  insulin lispro, 1-5 Units, Subcutaneous, Q6H MARIA EUGENIA  levothyroxine, 75 mcg, Oral, Early Morning  pantoprazole, 40 mg, Intravenous, Q12H MARIA EUGENIA  sucralfate, 1 g, Oral, BID      Continuous IV Infusions:     PRN Meds:  acetaminophen, 650 mg, Oral, Q6H PRN  Diclofenac Sodium, 2 g, Topical, 4x Daily PRN  HYDROcodone-acetaminophen, 1 tablet, Oral, TID PRN  ondansetron, 4 mg, Intravenous, Q4H PRN        IP CONSULT TO GASTROENTEROLOGY  IP CONSULT TO CASE MANAGEMENT    Network Utilization Review Department  ATTENTION: Please call with any questions or concerns to [phone] and carefully listen to the prompts so that you are directed to the right person. All voicemails are confidential.  Theta Jessica all requests for admission clinical reviews, approved or denied determinations and any other requests to dedicated fax number below belonging to the campus where the patient is receiving treatment.  List of dedicated fax numbers for the Facilities:  [place] DENIALS (Administrative/Medical Necessity) [phone] [address] (Maternity/NICU/Pediatrics) [phone]   [address] [place] [address] [address] [phone]

## 2023-08-09 NOTE — QUICK NOTE
Patient seen and examined, overall feeling better after blood transfusion, plan of care discussed briefly today with GI team, likely EGD today. Repeat H&H today at 4 PM, repeat blood work ordered for tomorrow      Plan of care discussed in detail with the patient, plan of care discussed with nursing team.    I attempted to contact the patient's daughter Vikki Dunbar with an update, voicemail left.

## 2023-08-09 NOTE — ANESTHESIA PREPROCEDURE EVALUATION
Procedure:  EGD    Relevant Problems   CARDIO   (+) Essential hypertension   (+) Hyperlipidemia   (+) Other chest pain      ENDO   (+) Acquired hypothyroidism   (+) Type 2 diabetes mellitus with stage 3b chronic kidney disease, without long-term current use of insulin (HCC)      GI/HEPATIC   (+) Hiatal hernia with GERD without esophagitis   (+) Large hiatal hernia      /RENAL   (+) Acquired renal cyst of left kidney   (+) Stage 3b chronic kidney disease (HCC)      HEMATOLOGY   (+) Symptomatic anemia      MUSCULOSKELETAL   (+) Generalized osteoarthritis   (+) Hiatal hernia with GERD without esophagitis   (+) Large hiatal hernia      Hemoglobin    9.7 Low         LEFT VENTRICLE:  Size was normal.  Systolic function was normal. Ejection fraction was estimated in the range of 55 % to 65 %. There were no regional wall motion abnormalities. There was very mild diffuse hypokinesis. Wall thickness was normal.  There was no evidence of concentric hypertrophy.     MITRAL VALVE:  There was mild regurgitation.     AORTIC VALVE:  The valve was probably trileaflet. There was no evidence for stenosis. There was trace regurgitation.     TRICUSPID VALVE:  There was mild regurgitation. Physical Exam    Airway    Mallampati score: II  TM Distance: >3 FB  Neck ROM: full     Dental       Cardiovascular      Pulmonary      Other Findings        Anesthesia Plan  ASA Score- 3     Anesthesia Type- IV sedation with anesthesia with ASA Monitors. Additional Monitors:   Airway Plan:           Plan Factors-    Chart reviewed. Induction- intravenous. Postoperative Plan-     Informed Consent- Anesthetic plan and risks discussed with patient. I personally reviewed this patient with the CRNA. Discussed and agreed on the Anesthesia Plan with the CRNA. Daryl Leigh

## 2023-08-09 NOTE — ASSESSMENT & PLAN NOTE
History of H pylori diagnosed by EGD in 2021. Treated with triple therapy regimen. Suspect recurrence based on presentation. Consult GI.

## 2023-08-10 LAB
ANION GAP SERPL CALCULATED.3IONS-SCNC: 8 MMOL/L
BASOPHILS # BLD AUTO: 0.04 THOUSANDS/ÂΜL (ref 0–0.1)
BASOPHILS NFR BLD AUTO: 1 % (ref 0–1)
BUN SERPL-MCNC: 10 MG/DL (ref 5–25)
CALCIUM SERPL-MCNC: 9.1 MG/DL (ref 8.4–10.2)
CHLORIDE SERPL-SCNC: 105 MMOL/L (ref 96–108)
CO2 SERPL-SCNC: 27 MMOL/L (ref 21–32)
CREAT SERPL-MCNC: 1.07 MG/DL (ref 0.6–1.3)
EOSINOPHIL # BLD AUTO: 0.09 THOUSAND/ÂΜL (ref 0–0.61)
EOSINOPHIL NFR BLD AUTO: 1 % (ref 0–6)
ERYTHROCYTE [DISTWIDTH] IN BLOOD BY AUTOMATED COUNT: 18.2 % (ref 11.6–15.1)
GFR SERPL CREATININE-BSD FRML MDRD: 47 ML/MIN/1.73SQ M
GLUCOSE SERPL-MCNC: 105 MG/DL (ref 65–140)
GLUCOSE SERPL-MCNC: 113 MG/DL (ref 65–140)
GLUCOSE SERPL-MCNC: 119 MG/DL (ref 65–140)
GLUCOSE SERPL-MCNC: 85 MG/DL (ref 65–140)
GLUCOSE SERPL-MCNC: 90 MG/DL (ref 65–140)
GLUCOSE SERPL-MCNC: 99 MG/DL (ref 65–140)
HCT VFR BLD AUTO: 31.8 % (ref 34.8–46.1)
HGB BLD-MCNC: 9.1 G/DL (ref 11.5–15.4)
IMM GRANULOCYTES # BLD AUTO: 0.05 THOUSAND/UL (ref 0–0.2)
IMM GRANULOCYTES NFR BLD AUTO: 1 % (ref 0–2)
INR PPP: 1.12 (ref 0.84–1.19)
LYMPHOCYTES # BLD AUTO: 1.18 THOUSANDS/ÂΜL (ref 0.6–4.47)
LYMPHOCYTES NFR BLD AUTO: 17 % (ref 14–44)
MAGNESIUM SERPL-MCNC: 2.2 MG/DL (ref 1.9–2.7)
MCH RBC QN AUTO: 23.6 PG (ref 26.8–34.3)
MCHC RBC AUTO-ENTMCNC: 28.6 G/DL (ref 31.4–37.4)
MCV RBC AUTO: 83 FL (ref 82–98)
MONOCYTES # BLD AUTO: 0.53 THOUSAND/ÂΜL (ref 0.17–1.22)
MONOCYTES NFR BLD AUTO: 8 % (ref 4–12)
NEUTROPHILS # BLD AUTO: 5.09 THOUSANDS/ÂΜL (ref 1.85–7.62)
NEUTS SEG NFR BLD AUTO: 72 % (ref 43–75)
NRBC BLD AUTO-RTO: 0 /100 WBCS
PHOSPHATE SERPL-MCNC: 3.8 MG/DL (ref 2.3–4.1)
PLATELET # BLD AUTO: 364 THOUSANDS/UL (ref 149–390)
PMV BLD AUTO: 9.2 FL (ref 8.9–12.7)
POTASSIUM SERPL-SCNC: 4.1 MMOL/L (ref 3.5–5.3)
PROTHROMBIN TIME: 14.7 SECONDS (ref 11.6–14.5)
RBC # BLD AUTO: 3.85 MILLION/UL (ref 3.81–5.12)
SODIUM SERPL-SCNC: 140 MMOL/L (ref 135–147)
WBC # BLD AUTO: 6.98 THOUSAND/UL (ref 4.31–10.16)

## 2023-08-10 PROCEDURE — 85610 PROTHROMBIN TIME: CPT | Performed by: INTERNAL MEDICINE

## 2023-08-10 PROCEDURE — 97116 GAIT TRAINING THERAPY: CPT

## 2023-08-10 PROCEDURE — 84100 ASSAY OF PHOSPHORUS: CPT | Performed by: INTERNAL MEDICINE

## 2023-08-10 PROCEDURE — 82948 REAGENT STRIP/BLOOD GLUCOSE: CPT

## 2023-08-10 PROCEDURE — 85025 COMPLETE CBC W/AUTO DIFF WBC: CPT | Performed by: INTERNAL MEDICINE

## 2023-08-10 PROCEDURE — 99232 SBSQ HOSP IP/OBS MODERATE 35: CPT | Performed by: INTERNAL MEDICINE

## 2023-08-10 PROCEDURE — 80048 BASIC METABOLIC PNL TOTAL CA: CPT | Performed by: INTERNAL MEDICINE

## 2023-08-10 PROCEDURE — C9113 INJ PANTOPRAZOLE SODIUM, VIA: HCPCS | Performed by: FAMILY MEDICINE

## 2023-08-10 PROCEDURE — 83735 ASSAY OF MAGNESIUM: CPT | Performed by: INTERNAL MEDICINE

## 2023-08-10 RX ORDER — INSULIN LISPRO 100 [IU]/ML
1-5 INJECTION, SOLUTION INTRAVENOUS; SUBCUTANEOUS
Status: DISCONTINUED | OUTPATIENT
Start: 2023-08-10 | End: 2023-08-11 | Stop reason: HOSPADM

## 2023-08-10 RX ORDER — INSULIN LISPRO 100 [IU]/ML
1-6 INJECTION, SOLUTION INTRAVENOUS; SUBCUTANEOUS
Status: DISCONTINUED | OUTPATIENT
Start: 2023-08-11 | End: 2023-08-11 | Stop reason: HOSPADM

## 2023-08-10 RX ADMIN — Medication 3 MG: at 21:49

## 2023-08-10 RX ADMIN — PANTOPRAZOLE SODIUM 40 MG: 40 INJECTION, POWDER, FOR SOLUTION INTRAVENOUS at 09:29

## 2023-08-10 RX ADMIN — LEVOTHYROXINE SODIUM 75 MCG: 75 TABLET ORAL at 05:37

## 2023-08-10 RX ADMIN — SUCRALFATE 1 G: 1 SUSPENSION ORAL at 09:26

## 2023-08-10 RX ADMIN — PANTOPRAZOLE SODIUM 40 MG: 40 INJECTION, POWDER, FOR SOLUTION INTRAVENOUS at 21:43

## 2023-08-10 RX ADMIN — ATORVASTATIN CALCIUM 20 MG: 10 TABLET, FILM COATED ORAL at 09:28

## 2023-08-10 RX ADMIN — FERROUS SULFATE TAB 325 MG (65 MG ELEMENTAL FE) 325 MG: 325 (65 FE) TAB at 09:29

## 2023-08-10 RX ADMIN — SUCRALFATE 1 G: 1 SUSPENSION ORAL at 21:49

## 2023-08-10 RX ADMIN — CYANOCOBALAMIN TAB 1000 MCG 1000 MCG: 1000 TAB at 09:28

## 2023-08-10 RX ADMIN — HYDROCODONE BITARTRATE AND ACETAMINOPHEN 1 TABLET: 5; 325 TABLET ORAL at 16:49

## 2023-08-10 NOTE — PLAN OF CARE
Problem: PHYSICAL THERAPY ADULT  Goal: Performs mobility at highest level of function for planned discharge setting. See evaluation for individualized goals. Description: Treatment/Interventions: Functional transfer training, LE strengthening/ROM, Elevations, Therapeutic exercise, Endurance training, Bed mobility, Gait training          See flowsheet documentation for full assessment, interventions and recommendations. Outcome: Progressing  Note: Prognosis: Good  Problem List: Decreased strength, Decreased endurance, Impaired balance, Decreased mobility  Assessment: Pt seen for PT treatment session this date with interventions consisting of gait training to normalize gait pattern to decrease fall risk. Pt agreeable to PT treatment session upon arrival, pt found supine in bed, in no apparent distress. Since previous session, pt has made good progress as evidenced by increased distance ambulated and decreased assistance required with mobility  Barriers during this session include none. Pt continues to be functioning below baseline level, and remains limited 2* factors listed above and including decreased balance. Pt prognosis for achieving goals is good, pending pt progress with hospitalization/medical status improvements, and indicated by motivated to participate in therapy, ability to follow cues and improvement with mobility status. PT will continue to see pt during current hospitalization in order to address the deficits listed above and provide interventions consistent w/ POC in effort to achieve goals. Current goals and POC remain appropriate, pt continues to have rehab potential  Continue to recommend home with outpatient rehabilitation at time of d/c in order to maximize pt's functional independence and safety w/ mobility. Upon conclusion pt supine in bed. The patient's AM-PAC Basic Mobility Inpatient Short Form Raw Score is 22.  A Raw score of greater than 16 suggests the patient may benefit from discharge to home. Please also refer to the recommendation of the Physical Therapist for safe discharge planning. PT Discharge Recommendation: Home with outpatient rehabilitation    See flowsheet documentation for full assessment.

## 2023-08-10 NOTE — PHYSICAL THERAPY NOTE
PT Treatment Note    NAME:  Misti Teague  DATE: 08/10/23    AGE:   79 y.o. Mrn:   [id number]  ADMIT DX:  Chest pain [R07.9]  Abdominal pain [N65.5]  History of Helicobacter pylori infection [Z86.19]  Symptomatic anemia [D64.9]  Hiatal hernia with GERD without esophagitis [K44.9, K21.9]  Performed at least 2 patient identifiers during session: Name and ID bracelet       08/10/23 1349   PT Last Visit   PT Visit Date 08/10/23   Note Type   Note Type Treatment   Pain Assessment   Pain Score No Pain   Restrictions/Precautions   Weight Bearing Precautions Per Order No   Other Precautions Fall Risk   General   Chart Reviewed Yes   Response to Previous Treatment Patient with no complaints from previous session. Family/Caregiver Present No   Cognition   Overall Cognitive Status WFL   Arousal/Participation Alert   Attention Within functional limits   Orientation Level Oriented X4   Memory Within functional limits   Following Commands Follows all commands and directions without difficulty   Subjective   Subjective "I feel like a million bucks"   Bed Mobility   Supine to Sit 6  Modified independent   Additional items HOB elevated   Sit to Supine 6  Modified independent   Additional items HOB elevated   Transfers   Sit to Stand 6  Modified independent   Additional items Increased time required   Stand to Sit 6  Modified independent   Additional items Increased time required   Ambulation/Elevation   Gait pattern Narrow PAWAN; Decreased foot clearance   Gait Assistance 5  Supervision   Additional items Verbal cues   Assistive Device Rolling walker   Distance 250 ft   Balance   Static Sitting Normal   Dynamic Sitting Good   Static Standing Fair   Dynamic Standing Fair -   Ambulatory Fair -   Endurance Deficit   Endurance Deficit No   Activity Tolerance   Activity Tolerance Patient tolerated treatment well   Assessment   Prognosis Good   Assessment Pt seen for PT treatment session this date with interventions consisting of gait training to normalize gait pattern to decrease fall risk. Pt agreeable to PT treatment session upon arrival, pt found supine in bed, in no apparent distress. Since previous session, pt has made good progress as evidenced by increased distance ambulated and decreased assistance required with mobility  Barriers during this session include none. Pt continues to be functioning below baseline level, and remains limited 2* factors listed above and including decreased balance. Pt prognosis for achieving goals is good, pending pt progress with hospitalization/medical status improvements, and indicated by motivated to participate in therapy, ability to follow cues and improvement with mobility status. PT will continue to see pt during current hospitalization in order to address the deficits listed above and provide interventions consistent w/ POC in effort to achieve goals. Current goals and POC remain appropriate, pt continues to have rehab potential  Continue to recommend home with outpatient rehabilitation at time of d/c in order to maximize pt's functional independence and safety w/ mobility. Upon conclusion pt supine in bed. The patient's AM-PAC Basic Mobility Inpatient Short Form Raw Score is 22. A Raw score of greater than 16 suggests the patient may benefit from discharge to home. Please also refer to the recommendation of the Physical Therapist for safe discharge planning.    Goals   Patient Goals to go home to her dog   PT Treatment Day 1   Plan   Progress Progressing toward goals   Recommendation   PT Discharge Recommendation Home with outpatient rehabilitation   AM-PAC Basic Mobility Inpatient   Turning in Flat Bed Without Bedrails 4   Lying on Back to Sitting on Edge of Flat Bed Without Bedrails 4   Moving Bed to Chair 4   Standing Up From Chair Using Arms 4   Walk in Room 3   Climb 3-5 Stairs With Railing 3   Basic Mobility Inpatient Raw Score 22   Basic Mobility Standardized Score 47.4   Highest Level Of Mobility   JH-HLM Goal 7: Walk 25 feet or more   JH-HLM Achieved 8: Walk 250 feet ot more         Time In: 1349  Time Out: 1407  Total Treatment Minutes: 316 [facility], PT

## 2023-08-10 NOTE — UTILIZATION REVIEW
Continued Stay Review    Date: 8/10                          Current Patient Class: IP   Current Level of Care: MS    HPI:83 y.o. female initially admitted on  8/9    Assessment/Plan:     Date: 8/10   Day 3: Has surpassed a 2nd midnight with active treatments and services, which include monitoring of CBC and cont GI workup     8/9 GI Consult   worsening heartburn and GERD symptoms is due to her underlying hiatal hernia which was medium sized on previous EGD. Plan for eGD , NPO , cont PPI , monitor bld counts and transfuse prn .        Vital Signs:   08/09/23 21:15:46 97.9 °F (36.6 °C) 69 -- 153/78 103 98 % -- -- --   08/09/23 2115 -- -- -- -- -- 97 % None (Room air) -- --   08/09/23 21:13:58 -- 68 -- 152/132 Abnormal  139 98 % -- -- --   08/09/23 16:29:47 98.1 °F (36.7 °C) 65 -- 114/66 82 96 % -- -- --   08/09/23 1555 -- 74 22 102/56 61 Abnormal  96 % None (Room air) X --   08/09/23 1547 97 °F (36.1 °C) Abnormal  63 21 92/51 -- 97 % None (Room air) -- --   08/09/23 1542 97.1 °F (36.2 °C) Abnormal  63 20 92/51 -- 99 % None (Room air) X          Pertinent Labs/Diagnostic Results:   • 8/9 EGD 7 cm sliding hiatal hernia (type I hiatal hernia) with Devika [place] lesions present - GE junction 28 cm from the incisors, diaphragmatic impression 35 cm from the incisors  • Moderate erythematous mucosa in the antrum and prepyloric region, consistent with gastritis; performed cold forceps biopsy to rule out H. pylori  • The upper third of the esophagus, middle third of the esophagus and lower third of the esophagus appeared normal.  The duodenal bulb and 2nd part of the duodenum appeared normal.  Results from last 7 days   Lab Units 08/08/23  1933   SARS-COV-2  Negative     Results from last 7 days   Lab Units 08/10/23  0500 08/09/23  1818 08/09/23  1159 08/09/23  0412 08/08/23  1933   WBC Thousand/uL 6.98  --   --  5.54 5.02   HEMOGLOBIN g/dL 9.1* 9.3* 9.7* 8.2* 6.9*   HEMATOCRIT % 31.8* 32.3*  --  28.2* 25.0*   PLATELETS Thousands/uL 364  --   --  336 349   NEUTROS ABS Thousands/µL 5.09  --   --  3.57 3.19         Results from last 7 days   Lab Units 08/10/23  0500 08/09/23 0412 08/08/23 1933   SODIUM mmol/L 140 138 139   POTASSIUM mmol/L 4.1 4.7 4.1   CHLORIDE mmol/L 105 104 104   CO2 mmol/L 27 29 28   ANION GAP mmol/L 8 5 7   BUN mg/dL 10 9 10   CREATININE mg/dL 1.07 1.03 1.09   EGFR ml/min/1.73sq m 47 50 46   CALCIUM mg/dL 9.1 8.9 8.6   MAGNESIUM mg/dL 2.2 2.1 2.0   PHOSPHORUS mg/dL 3.8 3.6  --      Results from last 7 days   Lab Units 08/09/23 0412 08/08/23 1933   AST U/L 13 13   ALT U/L 8 8   ALK PHOS U/L 50 49   TOTAL PROTEIN g/dL 6.0* 6.2*   ALBUMIN g/dL 3.5 3.7   TOTAL BILIRUBIN mg/dL 1.09* 0.26   BILIRUBIN DIRECT mg/dL  --  0.04     Results from last 7 days   Lab Units 08/10/23  0537 08/10/23  0045 08/09/23  2110 08/09/23  1552 08/09/23  1149 08/09/23  0549 08/09/23  0157   POC GLUCOSE mg/dl 90 99 125 104 87 100 108     Results from last 7 days   Lab Units 08/10/23  0500 08/09/23 0412 08/08/23 1933   GLUCOSE RANDOM mg/dL 85 96 128         Results from last 7 days   Lab Units 08/09/23 0412   HEMOGLOBIN A1C % 6.2*   EAG mg/dl 131     Results from last 7 days   Lab Units 08/08/23 1933   D-DIMER QUANTITATIVE ug/ml FEU 1.20*     Results from last 7 days   Lab Units 08/10/23  0500 08/09/23 0412 08/08/23 1933   PROTIME seconds 14.7* 14.1 14.1   INR  1.12 1.06 1.07   PTT seconds  --   --  32     Results from last 7 days   Lab Units 08/09/23 0412   TSH 3RD GENERATON uIU/mL 5.758*         Results from last 7 days   Lab Units 08/08/23  1933   LACTIC ACID mmol/L 0.9     Results from last 7 days   Lab Units 08/09/23  0412   FERRITIN ng/mL 8*   IRON SATURATION % 49   IRON ug/dL 158   TIBC ug/dL 320     Results from last 7 days   Lab Units 08/09/23  0633   UNIT PRODUCT CODE  [id number]   UNIT NUMBER  [id number]   UNITABO  O   UNITRH  POS   CROSSMATCH  Compatible   UNIT DISPENSE STATUS  Presumed Trans   UNIT PRODUCT VOL ml 300 Results from last 7 days   Lab Units 08/08/23  1933   LIPASE u/L 47         Results from last 7 days   Lab Units 08/08/23  2104   CLARITY UA  Clear   COLOR UA  Light Yellow   SPEC GRAV UA  1.015   PH UA  7.0   GLUCOSE UA mg/dl Negative   KETONES UA mg/dl Negative   BLOOD UA  Negative   PROTEIN UA mg/dl Negative   NITRITE UA  Negative   BILIRUBIN UA  Negative   UROBILINOGEN UA E.U./dl 0.2   LEUKOCYTES UA  Trace*   WBC UA /hpf 2-4   RBC UA /hpf 0-1   BACTERIA UA /hpf None Seen   EPITHELIAL CELLS WET PREP /hpf None Seen     Results from last 7 days   Lab Units 08/08/23  1933   INFLUENZA A PCR  Negative   INFLUENZA B PCR  Negative   RSV PCR  Negative             Medications:   Scheduled Medications:  atorvastatin, 20 mg, Oral, Daily  vitamin B-12, 1,000 mcg, Oral, QAM  ferrous sulfate, 325 mg, Oral, Daily With Breakfast  insulin lispro, 1-5 Units, Subcutaneous, Q6H MARIA EUGENIA  levothyroxine, 75 mcg, Oral, Early Morning  pantoprazole, 40 mg, Intravenous, Q12H MARIA EUGENIA  sucralfate, 1 g, Oral, BID      Continuous IV Infusions:     PRN Meds:  acetaminophen, 650 mg, Oral, Q6H PRN  Diclofenac Sodium, 2 g, Topical, 4x Daily PRN  HYDROcodone-acetaminophen, 1 tablet, Oral, TID PRN  melatonin, 3 mg, Oral, HS PRN  ondansetron, 4 mg, Intravenous, Q4H PRN  ondansetron, 4 mg, Intravenous, Once PRN        Discharge Plan: [facility]     Network Utilization Review Department  ATTENTION: Please call with any questions or concerns to [phone] and carefully listen to the prompts so that you are directed to the right person. All voicemails are confidential.  Violeta Flores all requests for admission clinical reviews, approved or denied determinations and any other requests to dedicated fax number below belonging to the campus where the patient is receiving treatment.  List of dedicated fax numbers for the Facilities:  FACILITY NAME UR FAX NUMBER   ADMISSION DENIALS (Administrative/Medical Necessity) [phone]   [address] [facility] (Maternity/NICU/Pediatrics) [address] [facility] [address] [phone]   [address] [phone]   [address] [facility] [address] [address] [facility] [address] [phone]

## 2023-08-10 NOTE — CASE MANAGEMENT
Case Management Discharge Planning Note    Patient name Fernando White  Location [address] [facility] [id number] MRN [id number]  : 1940 Date 8/10/2023       Current Admission Date: 2023  Current Admission Diagnosis:Symptomatic anemia   Patient Active Problem List    Diagnosis Date Noted   • Stage 3b chronic kidney disease ([address]) 2023   • Generalized osteoarthritis 2023   • History of Helicobacter pylori infection 2023   • Acquired renal cyst of left kidney 2023   • Elevated d-dimer 2023   • Symptomatic anemia 03/15/2022   • Other chest pain 2021   • Moderate protein-calorie malnutrition ([address]) 2021   • Epigastric pain 2021   • Essential hypertension 2021   • Dyslipidemia 2021   • Type 2 diabetes mellitus with stage 3b chronic kidney disease, without long-term current use of insulin ([address]) 2021   • Acquired hypothyroidism 2021   • Large hiatal hernia 2021   • Hiatal hernia with GERD without esophagitis 2021   • Hyperlipidemia 2021   • Ulnar nerve abnormality 2020   • Carpal tunnel syndrome of right wrist 2020   • Carpal tunnel syndrome of left wrist 2020      LOS (days): 1  Geometric Mean LOS (GMLOS) (days): 2.70  Days to GMLOS:1.1     OBJECTIVE:  Risk of Unplanned Readmission Score: 11.22         Current admission status: Inpatient   Preferred Pharmacy:   [address] [place] [address] [id number] [facility] [address]  Phone: [phone] Fax: [phone]    Primary Care Provider: Jayy Mensah MD    [facility]  Secondary Insurance: [employer]    DISCHARGE DETAILS:        patient discussed in huddle anticipated dc 24 hrs. Pt/Ot rec for ut patient therapy. Patient provided list of [facility] out patient clinic in area.

## 2023-08-10 NOTE — PROGRESS NOTES
[address]  Progress Note  Name: Joi Pierce  MRN: [id number]  Unit/Bed#: 321-82 I Date of Admission: 8/8/2023   Date of Service: 8/10/2023 I Hospital Day: 1    Assessment/Plan   Elevated d-dimer  Assessment & Plan  D-dimer (1.2) mildly elevated at time of presentation. Non-specific finding. CT chest negative for PE and patient has no symptoms/signs of DVT. No further evaluation at this time. Acquired renal cyst of left kidney  Assessment & Plan  Chronic condition. Visible on prior imaging for several years, though not as well due to lack of contrast with those studies. Asymptomatic simple cyst approximately 2.6 cm diameter. No hematuria or other apparent complications. No further evaluation at this time; follow up with primary care as needed for development of any flank pain or urinary symptoms. History of Helicobacter pylori infection  Assessment & Plan  History of H pylori diagnosed by EGD in 2021. Treated with triple therapy regimen. Suspect recurrence based on presentation. Consult GI. Generalized osteoarthritis  Assessment & Plan  Chronic stable condition, primarily involving bilateral hands, knees, ankles, and feet. Treated as outpatient with voltaren gel as needed (at least daily) and norco 5-325 mg as needed (typically BID). Continue outpatient medication regimen with addition of tylenol as needed for mild pain. Stage 3b chronic kidney disease [facility])  Assessment & Plan  Lab Results   Component Value Date    EGFR 50 08/09/2023    EGFR 46 08/08/2023    EGFR 40 03/15/2022    CREATININE 1.03 08/09/2023    CREATININE 1.09 08/08/2023    CREATININE 1.23 03/15/2022     Chronic stable condition. Patient at baseline creatinine 1.09 at time of presentation. Monitor renal function and fluid/electrolyte status. Minimize nephrotoxins. Hiatal hernia with GERD without esophagitis  Assessment & Plan  Chronic condition.   Symptoms worsening over past few months despite multiple medication changes. Patient has history of H pylori detected on EGD in 2021. 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 Currently treated as outpatient with pantoprazole 40 mg daily and sucralfate 1 gram BID. Continue sucralfate; switch pantoprazole to IV form BID due to severe anemia. Consult GI. Acquired hypothyroidism  Assessment & Plan  Chronic stable condition. Treated as outpatient with levothyroxine 75 mcg daily. Continue current dose for now. Check TSH with morning labs and adjust dose accordingly. Type 2 diabetes mellitus with stage 3b chronic kidney disease, without long-term current use of insulin [facility])  Assessment & Plan  Lab Results   Component Value Date    HGBA1C 7.1 (H) 04/14/2022       Recent Labs     08/09/23  0157 08/09/23  0549   POCGLU 108 100       Blood Sugar Average: Last 72 hrs:  (P) 104     Last hemoglobin A1c 7.1 in April 2022. Treated as outpatient with diet/activity only; no medications. Check current A1c and initiate insulin protocol pending further evaluation. CKD 3B is chronic and stable. Patient at baseline creatinine 1.09 at time of presentation. Monitor renal function and fluid/electrolyte status. Minimize nephrotoxins. Dyslipidemia  Assessment & Plan  Chronic stable condition. Treated as outpatient with atorvastatin 20 mg daily. Continue outpatient medication regimen. * Symptomatic anemia  Assessment & Plan  Patient presenting with acute on chronic anemia and increasingly severe GERD symptoms for several months. Patient denies any overt bleeding and vital signs normal, consistent with chronic occult bleeding. Suspect secondary to gastritis or ulcer. Patient has prior diagnosis of H pylori; possible recurrence. Most recent hemoglobin 10.5 in April 2022; hemoglobin 6.9 at time of presentation.   1 unit PRBCs transfused at time of admission. Check vitamin/mineral status (eg, iron panel, B12, folate) and adjust supplementation accordingly. Treated as outpatient with pantoprazole 40 mg PO daily; switch to 40 mg IV BID pending further evaluation. Consult GI. Maintain NPO status to mitigate GI irritation and for potential EGD.    8/10 -EGD performed yesterday; appreciate GI recommendations noted to have gastritis and Harolyn Curlin lesions in the colon advance to clear liquid diet and will continue PPI IV twice daily and likely transition to oral PPI twice daily tomorrow if she continues to do well. Monitor hemoglobin on morning labs; transfuse as needed. VTE Pharmacologic Prophylaxis:   Pharmacologic: Pharmacologic VTE Prophylaxis contraindicated due to GI bleed  Mechanical VTE Prophylaxis in Place: Yes    Patient Centered Rounds: I have performed bedside rounds with nursing staff today. Discussions with Specialists or Other Care Team Provider:     Education and Discussions with Family / Patient: Discussed treatment plan with family and patient who agree with current plan; encouraged to ask questions and participate. Time Spent for Care: 45 minutes. More than 50% of total time spent on counseling and coordination of care as described above. Current Length of Stay: 1 day(s)    Current Patient Status: Inpatient   Certification Statement: The patient will continue to require additional inpatient hospital stay due to Treatment of GI bleed    Discharge Plan: To be determined, likely tomorrow    Code Status: Level 1 - Full Code      Subjective:   Patient seen and examined bedside, denies any acute symptoms and states she overall feels well. Hemoglobin declined slightly overnight; EGD yesterday showed gastritis and Kai lesions. Diet has been advanced, will monitor overnight and if hemoglobin remained stable, likely stable for discharge tomorrow. PT/OT recommending home with outpatient rehab.     Objective:     Vitals:   Temp (24hrs), Av.6 °F (36.4 °C), Min:97 °F (36.1 °C), Max:98.1 °F (36.7 °C)    Temp:  [97 °F (36.1 °C)-98.1 °F (36.7 °C)] 97.8 °F (36.6 °C)  HR:  [63-90] 90  Resp:  [18-22] 18  BP: ()/() 153/88  SpO2:  [96 %-99 %] 96 %  Body mass index is 25.62 kg/m². Input and Output Summary (last 24 hours): Intake/Output Summary (Last 24 hours) at 8/10/2023 1425  Last data filed at 8/10/2023 1217  Gross per 24 hour   Intake 400 ml   Output --   Net 400 ml       Physical Exam:     Physical Exam  Vitals and nursing note reviewed. Constitutional:       General: She is not in acute distress. Appearance: She is well-developed. HENT:      Head: Normocephalic and atraumatic. Eyes:      Conjunctiva/sclera: Conjunctivae normal.   Cardiovascular:      Rate and Rhythm: Normal rate. Heart sounds: No murmur heard. Pulmonary:      Effort: Pulmonary effort is normal. No respiratory distress. Abdominal:      Palpations: Abdomen is soft. Tenderness: There is no abdominal tenderness. Musculoskeletal:         General: No swelling. Cervical back: Neck supple. Skin:     General: Skin is warm and dry. Neurological:      Mental Status: She is alert.    Psychiatric:         Mood and Affect: Mood normal.           Additional Data:     Labs:    Results from last 7 days   Lab Units 08/10/23  0500   WBC Thousand/uL 6.98   HEMOGLOBIN g/dL 9.1*   HEMATOCRIT % 31.8*   PLATELETS Thousands/uL 364   NEUTROS PCT % 72   LYMPHS PCT % 17   MONOS PCT % 8   EOS PCT % 1     Results from last 7 days   Lab Units 08/10/23  0500 23  0412   SODIUM mmol/L 140 138   POTASSIUM mmol/L 4.1 4.7   CHLORIDE mmol/L 105 104   CO2 mmol/L 27 29   BUN mg/dL 10 9   CREATININE mg/dL 1.07 1.03   ANION GAP mmol/L 8 5   CALCIUM mg/dL 9.1 8.9   ALBUMIN g/dL  --  3.5   TOTAL BILIRUBIN mg/dL  --  1.09*   ALK PHOS U/L  --  50   ALT U/L  --  8   AST U/L  --  13   GLUCOSE RANDOM mg/dL 85 96     Results from last 7 days   Lab Units 08/10/23  0500   INR  1.12     Results from last 7 days   Lab Units 08/10/23  1125 08/10/23  0537 08/10/23  0045 08/09/23  2110 08/09/23  1552 08/09/23  1149 08/09/23  0549 08/09/23  0157   POC GLUCOSE mg/dl 113 90 99 125 104 87 100 108     Results from last 7 days   Lab Units 08/09/23  0412   HEMOGLOBIN A1C % 6.2*     Results from last 7 days   Lab Units 08/08/23  1933   LACTIC ACID mmol/L 0.9           * I Have Reviewed All Lab Data Listed Above. * Additional Pertinent Lab Tests Reviewed: All Labs Within Last 24 Hours Reviewed    Imaging:    Imaging Reports Reviewed Today Include: CTA chest; CT head  Imaging Personally Reviewed by Myself Includes:      Recent Cultures (last 7 days):           Last 24 Hours Medication List:   Current Facility-Administered Medications   Medication Dose Route Frequency Provider Last Rate   • acetaminophen  650 mg Oral Q6H PRN Julian Lang MD     • atorvastatin  20 mg Oral Daily Julian Lang MD     • vitamin B-12  1,000 mcg Oral QAM Julian Lang MD     • Diclofenac Sodium  2 g Topical 4x Daily PRN Julian Lang MD     • ferrous sulfate  325 mg Oral Daily With Breakfast Julian Lang MD     • HYDROcodone-acetaminophen  1 tablet Oral TID PRN Julian Lang MD     • insulin lispro  1-5 Units Subcutaneous Q6H [address] Julian Lagn MD     • levothyroxine  75 mcg Oral Early Morning Julian Lang MD     • melatonin  3 mg Oral HS PRN Aron Law DO     • ondansetron  4 mg Intravenous Q4H PRN Julian Lang MD     • ondansetron  4 mg Intravenous Once PRN Annette Madrigal MD     • pantoprazole  40 mg Intravenous Q12H [address] Julian Lang MD     • sucralfate  1 g Oral BID Julian Lang MD          Today, Patient Was Seen By: Raegan Adams MD    ** Please Note: Dictation voice to text software may have been used in the creation of this document.  **

## 2023-08-10 NOTE — PLAN OF CARE
Problem: PAIN - ADULT  Goal: Verbalizes/displays adequate comfort level or baseline comfort level  Description: Interventions:  - Encourage patient to monitor pain and request assistance  - Assess pain using appropriate pain scale  - Administer analgesics based on type and severity of pain and evaluate response  - Implement non-pharmacological measures as appropriate and evaluate response  - Consider cultural and social influences on pain and pain management  - Notify physician/advanced practitioner if interventions unsuccessful or patient reports new pain  Outcome: Progressing     Problem: INFECTION - ADULT  Goal: Absence or prevention of progression during hospitalization  Description: INTERVENTIONS:  - Assess and monitor for signs and symptoms of infection  - Monitor lab/diagnostic results  - Monitor all insertion sites, i.e. indwelling lines, tubes, and drains  - Monitor endotracheal if appropriate and nasal secretions for changes in amount and color  - [place] appropriate cooling/warming therapies per order  - Administer medications as ordered  - Instruct and encourage patient and family to use good hand hygiene technique  - Identify and instruct in appropriate isolation precautions for identified infection/condition  Outcome: Progressing  Goal: Absence of fever/infection during neutropenic period  Description: INTERVENTIONS:  - Monitor WBC    Outcome: Progressing     Problem: SAFETY ADULT  Goal: Patient will remain free of falls  Description: INTERVENTIONS:  - Educate patient/family on patient safety including physical limitations  - Instruct patient to call for assistance with activity   - Consult OT/PT to assist with strengthening/mobility   - Keep Call bell within reach  - Keep bed low and locked with side rails adjusted as appropriate  - Keep care items and personal belongings within reach  - Initiate and maintain comfort rounds  - Make Fall Risk Sign visible to staff  - Offer Toileting every 2 Hours, in advance of need  - Initiate/Maintain bed/chair alarm  - Obtain necessary fall risk management equipment: call bell  - Apply yellow socks and bracelet for high fall risk patients  - Consider moving patient to room near nurses station  Outcome: Progressing  Goal: Maintain or return to baseline ADL function  Description: INTERVENTIONS:  -  Assess patient's ability to carry out ADLs; assess patient's baseline for ADL function and identify physical deficits which impact ability to perform ADLs (bathing, care of mouth/teeth, toileting, grooming, dressing, etc.)  - Assess/evaluate cause of self-care deficits   - Assess range of motion  - Assess patient's mobility; develop plan if impaired  - Assess patient's need for assistive devices and provide as appropriate  - Encourage maximum independence but intervene and supervise when necessary  - Involve family in performance of ADLs  - Assess for home care needs following discharge   - Consider OT consult to assist with ADL evaluation and planning for discharge  - Provide patient education as appropriate  Outcome: Progressing  Goal: Maintains/Returns to pre admission functional level  Description: INTERVENTIONS:  - Perform BMAT or MOVE assessment daily.   - Set and communicate daily mobility goal to care team and patient/family/caregiver. - Collaborate with rehabilitation services on mobility goals if consulted  - Perform Range of Motion 3 times a day. - Reposition patient every 2 hours.   - Dangle patient 3 times a day  - Stand patient 3 times a day  - Ambulate patient 3 times a day  - Out of bed to chair 3 times a day   - Out of bed for meals 3 times a day  - Out of bed for toileting  - Record patient progress and toleration of activity level   Outcome: Progressing     Problem: DISCHARGE PLANNING  Goal: Discharge to home or other facility with appropriate resources  Description: INTERVENTIONS:  - Identify barriers to discharge w/patient and caregiver  - Arrange for needed discharge resources and transportation as appropriate  - Identify discharge learning needs (meds, wound care, etc.)  - Arrange for interpretive services to assist at discharge as needed  - Refer to Case Management Department for coordinating discharge planning if the patient needs post-hospital services based on physician/advanced practitioner order or complex needs related to functional status, cognitive ability, or social support system  Outcome: Progressing     Problem: Knowledge Deficit  Goal: Patient/family/caregiver demonstrates understanding of disease process, treatment plan, medications, and discharge instructions  Description: Complete learning assessment and assess knowledge base.   Interventions:  - Provide teaching at level of understanding  - Provide teaching via preferred learning methods  Outcome: Progressing

## 2023-08-10 NOTE — PLAN OF CARE
Problem: PAIN - ADULT  Goal: Verbalizes/displays adequate comfort level or baseline comfort level  Description: Interventions:  - Encourage patient to monitor pain and request assistance  - Assess pain using appropriate pain scale  - Administer analgesics based on type and severity of pain and evaluate response  - Implement non-pharmacological measures as appropriate and evaluate response  - Consider cultural and social influences on pain and pain management  - Notify physician/advanced practitioner if interventions unsuccessful or patient reports new pain  8/10/2023 1105 by Tasha Bernardo  Outcome: Progressing  8/10/2023 1103 by Tasha Bernardo  Outcome: Progressing  8/10/2023 1103 by Tasha Bernardo  Outcome: Progressing     Problem: INFECTION - ADULT  Goal: Absence or prevention of progression during hospitalization  Description: INTERVENTIONS:  - Assess and monitor for signs and symptoms of infection  - Monitor lab/diagnostic results  - Monitor all insertion sites, i.e. indwelling lines, tubes, and drains  - Monitor endotracheal if appropriate and nasal secretions for changes in amount and color  - [place] appropriate cooling/warming therapies per order  - Administer medications as ordered  - Instruct and encourage patient and family to use good hand hygiene technique  - Identify and instruct in appropriate isolation precautions for identified infection/condition  8/10/2023 1105 by Tasha Bernardo  Outcome: Progressing  8/10/2023 1103 by Tasah Bernardo  Outcome: Progressing  8/10/2023 1103 by Tasha Bernardo  Outcome: Progressing  Goal: Absence of fever/infection during neutropenic period  Description: INTERVENTIONS:  - Monitor WBC    8/10/2023 1105 by Tasha Bernardo  Outcome: Progressing  8/10/2023 1103 by Tasha Bernardo  Outcome: Progressing  8/10/2023 1103 by Tasha Bernardo  Outcome: Progressing     Problem: SAFETY ADULT  Goal: Patient will remain free of falls  Description: INTERVENTIONS:  - Educate patient/family on patient safety including physical limitations  - Instruct patient to call for assistance with activity   - Consult OT/PT to assist with strengthening/mobility   - Keep Call bell within reach  - Keep bed low and locked with side rails adjusted as appropriate  - Keep care items and personal belongings within reach  - Initiate and maintain comfort rounds  - Make Fall Risk Sign visible to staff  - Initiate/Maintain bed/chair alarm  - Apply yellow socks and bracelet for high fall risk patients  - Consider moving patient to room near nurses station  8/10/2023 1105 by Martin Garcia  Outcome: Progressing  8/10/2023 1103 by Martin Garcia  Outcome: Progressing  8/10/2023 1103 by Martin Garcia  Outcome: Progressing  Goal: Maintain or return to baseline ADL function  Description: INTERVENTIONS:  -  Assess patient's ability to carry out ADLs; assess patient's baseline for ADL function and identify physical deficits which impact ability to perform ADLs (bathing, care of mouth/teeth, toileting, grooming, dressing, etc.)  - Assess/evaluate cause of self-care deficits   - Assess range of motion  - Assess patient's mobility; develop plan if impaired  - Assess patient's need for assistive devices and provide as appropriate  - Encourage maximum independence but intervene and supervise when necessary  - Involve family in performance of ADLs  - Assess for home care needs following discharge   - Consider OT consult to assist with ADL evaluation and planning for discharge  - Provide patient education as appropriate  8/10/2023 1105 by Martin Garcia  Outcome: Progressing  8/10/2023 1103 by Martin Garcia  Outcome: Progressing  8/10/2023 1103 by Martin Garcia  Outcome: Progressing  Goal: Maintains/Returns to pre admission functional level  Description: INTERVENTIONS:  - Perform BMAT or MOVE assessment daily.   - Set and communicate daily mobility goal to care team and patient/family/caregiver. - Collaborate with rehabilitation services on mobility goals if consulted  - Reposition patient every two hours. - Out of bed to chair three times a day   - Out of bed for meals three times a day  - Out of bed for toileting  - Record patient progress and toleration of activity level   8/10/2023 1105 [facility]  Outcome: Progressing  8/10/2023 1103 by Gwenetta Needs  Outcome: Progressing  8/10/2023 110[facility]  Outcome: Progressing     Problem: DISCHARGE PLANNING  Goal: Discharge to home or other facility with appropriate resources  Description: INTERVENTIONS:  - Identify barriers to discharge w/patient and caregiver  - Arrange for needed discharge resources and transportation as appropriate  - Identify discharge learning needs (meds, wound care, etc.)  - Arrange for interpretive services to assist at discharge as needed  - Refer to Case Management Department for coordinating discharge planning if the patient needs post-hospital services based on physician/advanced practitioner order or complex needs related to functional status, cognitive ability, or social support system  8/10/2023 1105 by [facility]  Outcome: Progressing  8/10/2023 1103 by Cerephe[facility]ta Needs  Outcome: Progressing  8/10/2023 1103 by Gwenetta Needs  Outcome: Progressing     Problem: Knowledge Deficit  Goal: Patient/family/caregiver demonstrates understanding of disease process, treatment plan, medications, and discharge instructions  Description: Complete learning assessment and assess knowledge base.   Interventions:  - Provide teaching at level of understanding  - Provide teaching via preferred learning methods  8/10/2023 1105 by Gwenetta Needs  Outcome: Progressing  8/10/2023 1103 by Gwenetta Needs  Outcome: Progressing  8/10/2023 1103 by Gwenetta Needs  Outcome: Progressing

## 2023-08-11 VITALS
WEIGHT: 131.17 LBS | DIASTOLIC BLOOD PRESSURE: 64 MMHG | TEMPERATURE: 97.6 F | OXYGEN SATURATION: 95 % | HEART RATE: 71 BPM | HEIGHT: 60 IN | BODY MASS INDEX: 25.75 KG/M2 | SYSTOLIC BLOOD PRESSURE: 114 MMHG | RESPIRATION RATE: 18 BRPM

## 2023-08-11 LAB
ANION GAP SERPL CALCULATED.3IONS-SCNC: 6 MMOL/L
BASOPHILS # BLD AUTO: 0.05 THOUSANDS/ÂΜL (ref 0–0.1)
BASOPHILS NFR BLD AUTO: 1 % (ref 0–1)
BUN SERPL-MCNC: 12 MG/DL (ref 5–25)
CALCIUM SERPL-MCNC: 9 MG/DL (ref 8.4–10.2)
CHLORIDE SERPL-SCNC: 104 MMOL/L (ref 96–108)
CO2 SERPL-SCNC: 29 MMOL/L (ref 21–32)
CREAT SERPL-MCNC: 1.18 MG/DL (ref 0.6–1.3)
EOSINOPHIL # BLD AUTO: 0.09 THOUSAND/ÂΜL (ref 0–0.61)
EOSINOPHIL NFR BLD AUTO: 1 % (ref 0–6)
ERYTHROCYTE [DISTWIDTH] IN BLOOD BY AUTOMATED COUNT: 18.1 % (ref 11.6–15.1)
GFR SERPL CREATININE-BSD FRML MDRD: 42 ML/MIN/1.73SQ M
GLUCOSE SERPL-MCNC: 100 MG/DL (ref 65–140)
GLUCOSE SERPL-MCNC: 121 MG/DL (ref 65–140)
GLUCOSE SERPL-MCNC: 94 MG/DL (ref 65–140)
HCT VFR BLD AUTO: 30.3 % (ref 34.8–46.1)
HGB BLD-MCNC: 8.7 G/DL (ref 11.5–15.4)
IMM GRANULOCYTES # BLD AUTO: 0.03 THOUSAND/UL (ref 0–0.2)
IMM GRANULOCYTES NFR BLD AUTO: 1 % (ref 0–2)
LYMPHOCYTES # BLD AUTO: 1.17 THOUSANDS/ÂΜL (ref 0.6–4.47)
LYMPHOCYTES NFR BLD AUTO: 19 % (ref 14–44)
MCH RBC QN AUTO: 24 PG (ref 26.8–34.3)
MCHC RBC AUTO-ENTMCNC: 28.7 G/DL (ref 31.4–37.4)
MCV RBC AUTO: 84 FL (ref 82–98)
MONOCYTES # BLD AUTO: 0.46 THOUSAND/ÂΜL (ref 0.17–1.22)
MONOCYTES NFR BLD AUTO: 7 % (ref 4–12)
NEUTROPHILS # BLD AUTO: 4.44 THOUSANDS/ÂΜL (ref 1.85–7.62)
NEUTS SEG NFR BLD AUTO: 71 % (ref 43–75)
NRBC BLD AUTO-RTO: 0 /100 WBCS
PLATELET # BLD AUTO: 342 THOUSANDS/UL (ref 149–390)
PMV BLD AUTO: 9 FL (ref 8.9–12.7)
POTASSIUM SERPL-SCNC: 4.4 MMOL/L (ref 3.5–5.3)
RBC # BLD AUTO: 3.63 MILLION/UL (ref 3.81–5.12)
SODIUM SERPL-SCNC: 139 MMOL/L (ref 135–147)
WBC # BLD AUTO: 6.24 THOUSAND/UL (ref 4.31–10.16)

## 2023-08-11 PROCEDURE — 85025 COMPLETE CBC W/AUTO DIFF WBC: CPT | Performed by: INTERNAL MEDICINE

## 2023-08-11 PROCEDURE — 80048 BASIC METABOLIC PNL TOTAL CA: CPT | Performed by: INTERNAL MEDICINE

## 2023-08-11 PROCEDURE — 82948 REAGENT STRIP/BLOOD GLUCOSE: CPT

## 2023-08-11 PROCEDURE — 99239 HOSP IP/OBS DSCHRG MGMT >30: CPT

## 2023-08-11 PROCEDURE — C9113 INJ PANTOPRAZOLE SODIUM, VIA: HCPCS | Performed by: FAMILY MEDICINE

## 2023-08-11 RX ORDER — PANTOPRAZOLE SODIUM 40 MG/1
40 TABLET, DELAYED RELEASE ORAL DAILY
Qty: 30 TABLET | Refills: 0 | Status: SHIPPED | OUTPATIENT
Start: 2023-08-11 | End: 2023-09-10

## 2023-08-11 RX ORDER — POLYETHYLENE GLYCOL 3350 17 G/17G
17 POWDER, FOR SOLUTION ORAL DAILY PRN
Status: DISCONTINUED | OUTPATIENT
Start: 2023-08-11 | End: 2023-08-11 | Stop reason: HOSPADM

## 2023-08-11 RX ORDER — FERROUS SULFATE 325(65) MG
325 TABLET ORAL
Qty: 30 TABLET | Refills: 0 | Status: SHIPPED | OUTPATIENT
Start: 2023-08-11 | End: 2023-09-10

## 2023-08-11 RX ADMIN — FERROUS SULFATE TAB 325 MG (65 MG ELEMENTAL FE) 325 MG: 325 (65 FE) TAB at 08:34

## 2023-08-11 RX ADMIN — CYANOCOBALAMIN TAB 1000 MCG 1000 MCG: 1000 TAB at 08:34

## 2023-08-11 RX ADMIN — LEVOTHYROXINE SODIUM 75 MCG: 75 TABLET ORAL at 05:00

## 2023-08-11 RX ADMIN — SUCRALFATE 1 G: 1 SUSPENSION ORAL at 08:33

## 2023-08-11 RX ADMIN — PANTOPRAZOLE SODIUM 40 MG: 40 INJECTION, POWDER, FOR SOLUTION INTRAVENOUS at 08:35

## 2023-08-11 RX ADMIN — ATORVASTATIN CALCIUM 20 MG: 10 TABLET, FILM COATED ORAL at 08:33

## 2023-08-11 NOTE — NURSING NOTE
Pt dc to home. Dc instructions given and reviewed with pt, pt verbalized understanding. Pt left via wheelchair with son and pca.

## 2023-08-11 NOTE — CASE MANAGEMENT
Case Management Discharge Planning Note    Patient name Rise Lively  Location [address] [id number] MRN [id number]  : 1940 Date 2023       Current Admission Date: 2023  Current Admission Diagnosis:Symptomatic anemia   Patient Active Problem List    Diagnosis Date Noted   • Stage 3b chronic kidney disease ([address]) 2023   • Generalized osteoarthritis 2023   • History of Helicobacter pylori infection 2023   • Acquired renal cyst of left kidney 2023   • Elevated d-dimer 2023   • Symptomatic anemia 03/15/2022   • Other chest pain 2021   • Moderate protein-calorie malnutrition ([address]) 2021   • Epigastric pain 2021   • Essential hypertension 2021   • Dyslipidemia 2021   • Type 2 diabetes mellitus with stage 3b chronic kidney disease, without long-term current use of insulin ([address]) 2021   • Acquired hypothyroidism 2021   • Large hiatal hernia 2021   • Hiatal hernia with GERD without esophagitis 2021   • Hyperlipidemia 2021   • Ulnar nerve abnormality 2020   • Carpal tunnel syndrome of right wrist 2020   • Carpal tunnel syndrome of left wrist 2020      LOS (days): 2  Geometric Mean LOS (GMLOS) (days): 2.60  Days to GMLOS:0     OBJECTIVE:  Risk of Unplanned Readmission Score: 11.29         Current admission status: Inpatient   Preferred Pharmacy:   [address] [place] [address] #[id number] [place] [id number] [place] [address]  Phone: [phone] Fax: [phone]    Primary Care Provider: Adarsh Rajan MD    Primary Insurance: [employer] Espinal [address] [place] [address]  Secondary Insurance: [facility]    DISCHARGE DETAILS:        Patient discussed in huddle stable for discharge home with out patient therapy. Nursing reviewed AVS follow up provider appts in AVS    family to transport home.

## 2023-08-11 NOTE — DISCHARGE SUMMARY
[address]  Discharge- Fabrice Chong 1940, 79 y.o. female MRN: [id number]  Unit/Bed#: 343-72 Encounter: [id number]  Primary Care Provider: Charlie Geller MD   Date and time admitted to hospital: 8/8/2023  6:03 PM    * Symptomatic anemia  Assessment & Plan  · Patient presenting with acute on chronic anemia and increasingly severe GERD symptoms for several months. · Patient has prior diagnosis of H pylori; possible recurrence. · F/u on bx  · 1 unit PRBCs transfused at time of admission. · EGD revealing gastritis, hiatal hernia w/ Tamiment Bergoo lesions  · Continue B12/iron supps  · S/p IV Protonix. Continue po Protonix 40mg & carafate at discharge per GI at   · OP GI & hematology referrals   · IP GI consult appreciated         Elevated d-dimer  Assessment & Plan  · D-dimer (1.2) mildly elevated at time of presentation. N  · on-specific finding. · CT chest negative for PE and patient has no symptoms/signs of DVT. · No further evaluation at this time. Acquired renal cyst of left kidney  Assessment & Plan  ·  Asymptomatic simple cyst approximately 2.6 cm diameter. No hematuria or other apparent complications. No further evaluation at this time  ·  follow up with primary care as needed for development of any flank pain or urinary symptoms. History of Helicobacter pylori infection  Assessment & Plan  · History of H pylori diagnosed by EGD in 2021  · OP GI follow-up for bx results      Generalized osteoarthritis  Assessment & Plan  · Chronic stable condition, primarily involving bilateral hands, knees, ankles, and feet. · Continue outpatient medication regimen with addition of tylenol as needed for mild pain.   · Avoid NSAIDS      Hiatal hernia with GERD without esophagitis  Assessment & Plan  · Continue Protonix & sucralfate  · Continue OP GI follow-up      Acquired hypothyroidism  Assessment & Plan  · Continue Synthroid       Type 2 diabetes mellitus with stage 3b chronic kidney disease, without long-term current use of insulin [facility])  Assessment & Plan  Lab Results   Component Value Date    HGBA1C 6.2 (H) 08/09/2023       Recent Labs     08/10/23  1526 08/10/23  2053 08/11/23  0727 08/11/23  1125   POCGLU 119 105 94 121       Blood Sugar Average: Last 72 hrs:  (P) [id number]     · A1c 6.2  · Continue dietary and lifestyle modifications at homes. Dyslipidemia  Assessment & Plan  · Continue statin . Medical Problems     Resolved Problems  Date Reviewed: 8/11/2023   None       Discharging Physician / Practitioner: Courtney Frye PA-C  PCP: Nakita Carrasco MD  Admission Date:   Admission Orders (From admission, onward)     Ordered        08/09/23 0000  INPATIENT ADMISSION  Once                      Discharge Date: 08/11/23    Consultations During Hospital Stay:  · GI    Procedures Performed:   · EGD: 7 cm type I hiatal hernia with Philis Force lesions present. Moderate abnormal mucosa in the antrum and prepyloric region, consistent with gastritis; performed cold forceps biopsy to rule out H. Pylori. The upper third of the esophagus, middle third of the esophagus and lower third of the esophagus appeared normal.The duodenal bulb and 2nd part of the duodenum appeared normal.  · CTH: No acute intracranial abnormality. · CT c spine: No cervical spine fracture or traumatic malalignment. · CTA chest a/p:No evidence of acute pulmonary embolus, thoracic aortic aneurysm or dissection. No acute cardiopulmonary process. No acute intra-abdominal or pelvic process.     Significant Findings / Test Results:   · Hgb 6.9>8.2>9.7>9.3>9.1>8.7    Incidental Findings:   · None    Test Results Pending at Discharge (will require follow up):   · EGD biopsies      Outpatient Tests Requested:  · OP referral to GI  · OP referral to hematology     Complications:  none    Reason for Admission: symptomatic anemia     Hospital Course:   Jennifer Horton is a 79 y.o. female patient who originally presented to the hospital on 8/8/2023 due to symptomatic acute on chronic anemia. She was noted with Hgb 6.9, she was transfused 1 unit of blood with improvement. GI was consulted and she underwent EGD revealing gastritis and Kai lesions. Note, patient with hx of H pylori gastritis s/p treatment, biopsies pending. Hgb remained stable without any overt signs of bleeding. Patient discharge home in stable condition on Protonix and sucralfate with outpatient GI & hematology referrals. Please see above list of diagnoses and related plan for additional information. Condition at Discharge: stable    Discharge Day Visit / Exam:   Subjective: Feels well today. No N/V or abdominal pain. She is tolerating diet without issue. No hematochezia, hematemesis, or melena. Vitals: Blood Pressure: 114/64 (08/11/23 0645)  Pulse: 71 (08/11/23 0645)  Temperature: 97.6 °F (36.4 °C) (08/11/23 0645)  Temp Source: Temporal (08/09/23 1547)  Respirations: 18 (08/11/23 0645)  Height: 5' (152.4 cm) (08/09/23 0052)  Weight - Scale: 59.5 kg (131 lb 2.8 oz) (08/09/23 0052)  SpO2: 95 % (08/11/23 0645)  Exam:   Physical Exam  Constitutional:       General: She is not in acute distress. Appearance: Normal appearance. HENT:      Head: Normocephalic and atraumatic. Cardiovascular:      Rate and Rhythm: Normal rate and regular rhythm. Pulses: Normal pulses. Heart sounds: Normal heart sounds. No murmur heard. Pulmonary:      Effort: Pulmonary effort is normal. No respiratory distress. Breath sounds: Normal breath sounds. Abdominal:      General: Abdomen is flat. Bowel sounds are normal. There is no distension. Palpations: Abdomen is soft. Musculoskeletal:      Right lower leg: No edema. Left lower leg: No edema. Skin:     General: Skin is warm and dry. Neurological:      General: No focal deficit present. Mental Status: She is alert and oriented to person, place, and time.    Psychiatric: Mood and Affect: Mood normal.         Behavior: Behavior normal.         Discussion with Family: Attempted to update  (son) via phone. Unable to contact. Discharge instructions/Information to patient and family:   See after visit summary for information provided to patient and family. Provisions for Follow-Up Care:  See after visit summary for information related to follow-up care and any pertinent home health orders. Disposition:   Home    Planned Readmission: none     Discharge Statement:  I spent 35 minutes discharging the patient. This time was spent on the day of discharge. I had direct contact with the patient on the day of discharge. Greater than 50% of the total time was spent examining patient, answering all patient questions, arranging and discussing plan of care with patient as well as directly providing post-discharge instructions. Additional time then spent on discharge activities. Discharge Medications:  See after visit summary for reconciled discharge medications provided to patient and/or family.       **Please Note: This note may have been constructed using a voice recognition system**

## 2023-08-11 NOTE — ASSESSMENT & PLAN NOTE
·  Asymptomatic simple cyst approximately 2.6 cm diameter. No hematuria or other apparent complications. No further evaluation at this time  ·  follow up with primary care as needed for development of any flank pain or urinary symptoms.

## 2023-08-11 NOTE — ASSESSMENT & PLAN NOTE
· D-dimer (1.2) mildly elevated at time of presentation. N  · on-specific finding. · CT chest negative for PE and patient has no symptoms/signs of DVT. · No further evaluation at this time.

## 2023-08-11 NOTE — ASSESSMENT & PLAN NOTE
· Chronic stable condition, primarily involving bilateral hands, knees, ankles, and feet. · Continue outpatient medication regimen with addition of tylenol as needed for mild pain.   · Avoid NSAIDS

## 2023-08-11 NOTE — PLAN OF CARE
Problem: PAIN - ADULT  Goal: Verbalizes/displays adequate comfort level or baseline comfort level  Description: Interventions:  - Encourage patient to monitor pain and request assistance  - Assess pain using appropriate pain scale  - Administer analgesics based on type and severity of pain and evaluate response  - Implement non-pharmacological measures as appropriate and evaluate response  - Consider cultural and social influences on pain and pain management  - Notify physician/advanced practitioner if interventions unsuccessful or patient reports new pain  Outcome: Progressing     Problem: INFECTION - ADULT  Goal: Absence or prevention of progression during hospitalization  Description: INTERVENTIONS:  - Assess and monitor for signs and symptoms of infection  - Monitor lab/diagnostic results  - Monitor all insertion sites, i.e. indwelling lines, tubes, and drains  - Monitor endotracheal if appropriate and nasal secretions for changes in amount and color  - Parker appropriate cooling/warming therapies per order  - Administer medications as ordered  - Instruct and encourage patient and family to use good hand hygiene technique  - Identify and instruct in appropriate isolation precautions for identified infection/condition  Outcome: Progressing  Goal: Absence of fever/infection during neutropenic period  Description: INTERVENTIONS:  - Monitor WBC    Outcome: Progressing     Problem: SAFETY ADULT  Goal: Patient will remain free of falls  Description: INTERVENTIONS:  - Educate patient/family on patient safety including physical limitations  - Instruct patient to call for assistance with activity   - Consult OT/PT to assist with strengthening/mobility   - Keep Call bell within reach  - Keep bed low and locked with side rails adjusted as appropriate  - Keep care items and personal belongings within reach  - Initiate and maintain comfort rounds  - Make Fall Risk Sign visible to staff  - Initiate/Maintain bed/chair alarm  - Apply yellow socks and bracelet for high fall risk patients  - Consider moving patient to room near nurses station  Outcome: Progressing  Goal: Maintain or return to baseline ADL function  Description: INTERVENTIONS:  -  Assess patient's ability to carry out ADLs; assess patient's baseline for ADL function and identify physical deficits which impact ability to perform ADLs (bathing, care of mouth/teeth, toileting, grooming, dressing, etc.)  - Assess/evaluate cause of self-care deficits   - Assess range of motion  - Assess patient's mobility; develop plan if impaired  - Assess patient's need for assistive devices and provide as appropriate  - Encourage maximum independence but intervene and supervise when necessary  - Involve family in performance of ADLs  - Assess for home care needs following discharge   - Consider OT consult to assist with ADL evaluation and planning for discharge  - Provide patient education as appropriate  Outcome: Progressing  Goal: Maintains/Returns to pre admission functional level  Description: INTERVENTIONS:  - Perform BMAT or MOVE assessment daily.   - Set and communicate daily mobility goal to care team and patient/family/caregiver. - Collaborate with rehabilitation services on mobility goals if consulted  - Reposition patient every two hours.   - Out of bed to chair three times a day   - Out of bed for meals three times a day  - Out of bed for toileting  - Record patient progress and toleration of activity level   Outcome: Progressing

## 2023-08-11 NOTE — ASSESSMENT & PLAN NOTE
· Patient presenting with acute on chronic anemia and increasingly severe GERD symptoms for several months. · Patient has prior diagnosis of H pylori; possible recurrence. · F/u on bx  · 1 unit PRBCs transfused at time of admission. · EGD revealing gastritis, hiatal hernia w/ Harolyn Curlin lesions  · Continue B12/iron supps  · S/p IV Protonix.  Continue po Protonix 40mg & carafate at discharge per GI at   · OP GI & hematology referrals   · IP GI consult appreciated

## 2023-08-11 NOTE — PROGRESS NOTES
Progress Note- Farideh Martin 79 y.o. female MRN: [id number]    Unit/Bed#: 161-58 Encounter: [id number]      Assessment and Plan:    Serology: RBC 3.63, H&H 8.7/30.3, MCH 24.0, MCHC 28.7, RDW 18.1. Exam: Pt was sitting in bed, A&O x 3, pleasant and cooperative today. Abdomen is soft, nondistended, nontender with normal bowel sounds x 4. Skin is non-icteric. No edema noted of the b/l lower extremities upon exam today. 1. Symptomatic Anemia  It does appear that her H&H has slightly decreased from yesterday as it was 9.1/31.8, but that overall she is still stable. Would recommend the patient follow-up with hematology on an outpatient basis. Continue to monitor for any evidence of josselyn GI bleeding. 2.GERD  3. Hiatal Hernia  4. History of H. Pylori  Improving/stable. Continue Protonix and sucralfate as prescribed. Continue current diet as tolerated. Await pathology results of biopsy regarding H. Pylori. From a GI standpoint, she can be discharged home and can follow up outpatient. GI will sign off.   __________________________________________________________________    Subjective:     Patient is a 79 y.o. female who is currently being treated for her GERD, hiatal hernia, symptomatic anemia, and history of H. Pylori. She did proceed with an EGD on 8/9/23 which did show a 7 cm type 1 hiatal hernia with Devika [place] lesions present and abnormal mucosa in the antrum and prepyloric region, consistent with gastritis. The patient reports that she is feel much better and has much more energy. She is tolerating a Rigo/CHO controlled, dysphagia diet without any N/V. She denies any abdominal pain or reflux. She reports that her last BM was yesterday and that it was normal without any melena or blood. She is anxious and very eager to go home.      Medication Administration - last 24 hours from 08/10/2023 0848 to 08/11/2023 0848       Date/Time Order Dose Route Action Action by     08/10/2023 1718 EDT insulin lispro (HumaLOG) 100 units/mL subcutaneous injection 1-5 Units -- Subcutaneous Not Given Akshat Casiano LPN     02/52/9311 1809 EDT insulin lispro (HumaLOG) 100 units/mL subcutaneous injection 1-5 Units -- Subcutaneous Not Given enetta Needs     08/10/2023 1649 EDT HYDROcodone-acetaminophen (NORCO) 5-325 mg per tablet 1 tablet 1 tablet Oral Given Gwenetta Needs     08/11/2023 3794 EDT atorvastatin (LIPITOR) tablet 20 mg 20 mg Oral Given Gwenetta Needs     08/10/2023 0427 EDT atorvastatin (LIPITOR) tablet 20 mg 20 mg Oral Given Gwenetta Needs     08/11/2023 4156 EDT ferrous sulfate tablet 325 mg 325 mg Oral Given Gwenetta Needs     08/10/2023 4479 EDT ferrous sulfate tablet 325 mg 325 mg Oral Given Gwenetta Needs     08/11/2023 0500 EDT levothyroxine tablet 75 mcg 75 mcg Oral Given Akshat Casiano LPN     83/56/9228 8388 EDT pantoprazole (PROTONIX) injection 40 mg 40 mg Intravenous Given Gwenetta Needs     08/10/2023 2143 EDT pantoprazole (PROTONIX) injection 40 mg 40 mg Intravenous Given Deborah Minor RN     08/10/2023 7690 EDT pantoprazole (PROTONIX) injection 40 mg 40 mg Intravenous Given enetta Needs     08/11/2023 2882 EDT sucralfate (CARAFATE) oral suspension (VANNESA/PEDS) 1 g 1 g Oral Given Gwenetta Needs     08/10/2023 2149 EDT sucralfate (CARAFATE) oral suspension (VANNESA/PEDS) 1 g 1 g Oral Given Deborah Minor RN     08/10/2023 7287 EDT sucralfate (CARAFATE) oral suspension (VANNESA/PEDS) 1 g 1 g Oral Given Gwenetta Needs     08/11/2023 7628 EDT cyanocobalamin (VITAMIN B-12) tablet 1,000 mcg 1,000 mcg Oral Given Gwenetta Needs     08/10/2023 7803 EDT cyanocobalamin (VITAMIN B-12) tablet 1,000 mcg 1,000 mcg Oral Given Gwenetta Needs     08/10/2023 2149 EDT melatonin tablet 3 mg 3 mg Oral Given Deborah Minor RN     08/11/2023 0827 EDT insulin lispro (HumaLOG) 100 units/mL subcutaneous injection 1-6 Units -- Subcutaneous Not Given Jaylyn Grace     08/10/2023 2105 EDT insulin lispro (HumaLOG) 100 units/mL subcutaneous injection 1-5 Units -- Subcutaneous Not Given Krystal Cruz LPN          Objective:     Vitals: Blood pressure 114/64, pulse 71, temperature 97.6 °F (36.4 °C), resp. rate 18, height 5' (1.524 m), weight 59.5 kg (131 lb 2.8 oz), SpO2 95 %. ,Body mass index is 25.62 kg/m². Intake/Output Summary (Last 24 hours) at 8/11/2023 0848  Last data filed at 8/10/2023 1723  Gross per 24 hour   Intake 480 ml   Output --   Net 480 ml       Physical Exam:   General Appearance: Awake and alert, in no acute distress  Abdomen: Soft, non-tender, non-distended; bowel sounds normal; no masses or no organomegaly    Invasive Devices     Peripheral Intravenous Line  Duration           Peripheral IV 08/08/23 Right Antecubital 2 days                Lab Results:  No results displayed because visit has over 200 results. Imaging Studies: I have personally reviewed pertinent imaging studies.

## 2023-08-11 NOTE — PLAN OF CARE
Problem: PAIN - ADULT  Goal: Verbalizes/displays adequate comfort level or baseline comfort level  Description: Interventions:  - Encourage patient to monitor pain and request assistance  - Assess pain using appropriate pain scale  - Administer analgesics based on type and severity of pain and evaluate response  - Implement non-pharmacological measures as appropriate and evaluate response  - Consider cultural and social influences on pain and pain management  - Notify physician/advanced practitioner if interventions unsuccessful or patient reports new pain  Outcome: Progressing     Problem: INFECTION - ADULT  Goal: Absence or prevention of progression during hospitalization  Description: INTERVENTIONS:  - Assess and monitor for signs and symptoms of infection  - Monitor lab/diagnostic results  - Monitor all insertion sites, i.e. indwelling lines, tubes, and drains  - Monitor endotracheal if appropriate and nasal secretions for changes in amount and color  - [place] appropriate cooling/warming therapies per order  - Administer medications as ordered  - Instruct and encourage patient and family to use good hand hygiene technique  - Identify and instruct in appropriate isolation precautions for identified infection/condition  Outcome: Progressing  Goal: Absence of fever/infection during neutropenic period  Description: INTERVENTIONS:  - Monitor WBC    Outcome: Progressing     Problem: SAFETY ADULT  Goal: Patient will remain free of falls  Description: INTERVENTIONS:  - Educate patient/family on patient safety including physical limitations  - Instruct patient to call for assistance with activity   - Consult OT/PT to assist with strengthening/mobility   - Keep Call bell within reach  - Keep bed low and locked with side rails adjusted as appropriate  - Keep care items and personal belongings within reach  - Initiate and maintain comfort rounds  - Make Fall Risk Sign visible to staff  - Initiate/Maintain bed/chair alarm  - Apply yellow socks and bracelet for high fall risk patients  - Consider moving patient to room near nurses station  Outcome: Progressing  Goal: Maintain or return to baseline ADL function  Description: INTERVENTIONS:  -  Assess patient's ability to carry out ADLs; assess patient's baseline for ADL function and identify physical deficits which impact ability to perform ADLs (bathing, care of mouth/teeth, toileting, grooming, dressing, etc.)  - Assess/evaluate cause of self-care deficits   - Assess range of motion  - Assess patient's mobility; develop plan if impaired  - Assess patient's need for assistive devices and provide as appropriate  - Encourage maximum independence but intervene and supervise when necessary  - Involve family in performance of ADLs  - Assess for home care needs following discharge   - Consider OT consult to assist with ADL evaluation and planning for discharge  - Provide patient education as appropriate  Outcome: Progressing  Goal: Maintains/Returns to pre admission functional level  Description: INTERVENTIONS:  - Perform BMAT or MOVE assessment daily.   - Set and communicate daily mobility goal to care team and patient/family/caregiver. - Collaborate with rehabilitation services on mobility goals if consulted  - Reposition patient every two hours.   - Out of bed to chair three times a day   - Out of bed for meals three times a day  - Out of bed for toileting  - Record patient progress and toleration of activity level   Outcome: Progressing     Problem: DISCHARGE PLANNING  Goal: Discharge to home or other facility with appropriate resources  Description: INTERVENTIONS:  - Identify barriers to discharge w/patient and caregiver  - Arrange for needed discharge resources and transportation as appropriate  - Identify discharge learning needs (meds, wound care, etc.)  - Arrange for interpretive services to assist at discharge as needed  - Refer to Case Management Department for coordinating discharge planning if the patient needs post-hospital services based on physician/advanced practitioner order or complex needs related to functional status, cognitive ability, or social support system  Outcome: Progressing     Problem: Knowledge Deficit  Goal: Patient/family/caregiver demonstrates understanding of disease process, treatment plan, medications, and discharge instructions  Description: Complete learning assessment and assess knowledge base. Interventions:  - Provide teaching at level of understanding  - Provide teaching via preferred learning methods  Outcome: Progressing     Problem: Nutrition/Hydration-ADULT  Goal: Nutrient/Hydration intake appropriate for improving, restoring or maintaining nutritional needs  Description: Monitor and assess patient's nutrition/hydration status for malnutrition. Collaborate with interdisciplinary team and initiate plan and interventions as ordered. Monitor patient's weight and dietary intake as ordered or per policy. Utilize nutrition screening tool and intervene as necessary. Determine patient's food preferences and provide high-protein, high-caloric foods as appropriate.      INTERVENTIONS:  - Monitor oral intake, urinary output, labs, and treatment plans  - Assess nutrition and hydration status and recommend course of action  - Evaluate amount of meals eaten  - Assist patient with eating if necessary   - Allow adequate time for meals  - Recommend/ encourage appropriate diets, oral nutritional supplements, and vitamin/mineral supplements  - Order, calculate, and assess calorie counts as needed  - Recommend, monitor, and adjust tube feedings and TPN/PPN based on assessed needs  - Assess need for intravenous fluids  - Provide specific nutrition/hydration education as appropriate  - Include patient/family/caregiver in decisions related to nutrition  Outcome: Progressing

## 2023-08-11 NOTE — ASSESSMENT & PLAN NOTE
Lab Results   Component Value Date    HGBA1C 6.2 (H) 08/09/2023       Recent Labs     08/10/23  1526 08/10/23  2053 08/11/23  0727 08/11/23  1125   POCGLU 119 105 94 121       Blood Sugar Average: Last 72 hrs:  (P) [id number]     · A1c 6.2  · Continue dietary and lifestyle modifications at homes.

## 2023-08-14 ENCOUNTER — TELEPHONE (OUTPATIENT)
Dept: HEMATOLOGY ONCOLOGY | Facility: CLINIC | Age: 83
End: 2023-08-14

## 2023-08-14 NOTE — TELEPHONE ENCOUNTER
I called Fernando Carrington in response to a referral that was received for patient to establish care with Hematology. Outreach was made to schedule a consultation. I left a voicemail explaining the reason for my call and advised patient to call \[facility]\"" at [phone]. Another attempt will be made to contact patient.

## 2023-08-14 NOTE — UTILIZATION REVIEW
NOTIFICATION OF ADMISSION DISCHARGE   This is a Notification of Discharge from [facility]. Please be advised that this patient has been discharge from our facility. Below you will find the admission and discharge date and time including the patient’s disposition. UTILIZATION REVIEW CONTACT:  Rosibel Harris  Utilization   Network Utilization Review Department  Phone: [phone] x carefully listen to the prompts. All voicemails are confidential.  Email: [email or web address]     ADMISSION INFORMATION  PRESENTATION DATE: 8/8/2023  6:03 PM  OBERVATION ADMISSION DATE:   INPATIENT ADMISSION DATE: 8/9/23 12:00 AM   DISCHARGE DATE: 8/11/2023  2:30 PM   DISPOSITION:Home/Self Care    IMPORTANT INFORMATION:  Send all requests for admission clinical reviews, approved or denied determinations and any other requests to dedicated fax number below belonging to the campus where the patient is receiving treatment.  List of dedicated fax numbers:  [place] DENIALS (Administrative/Medical Necessity) [phone] [address] [facility] (Maternity/NICU/Pediatrics) [phone]   [facility] [phone]   [facility] [phone] [address] [facility] [phone]   [address] [place] [address] [phone]   [facility] [phone]   [address] [place] [address] [address] [place] [address] [phone]   [address] [place] [address] [phone]   [address] [facility] [phone] 27[address] [facility] [address] 32[facility] [phone]

## 2023-08-16 ENCOUNTER — TELEPHONE (OUTPATIENT)
Dept: HEMATOLOGY ONCOLOGY | Facility: CLINIC | Age: 83
End: 2023-08-16

## 2023-08-16 PROCEDURE — 88342 IMHCHEM/IMCYTCHM 1ST ANTB: CPT | Performed by: STUDENT IN AN ORGANIZED HEALTH CARE EDUCATION/TRAINING PROGRAM

## 2023-08-16 PROCEDURE — 88305 TISSUE EXAM BY PATHOLOGIST: CPT | Performed by: STUDENT IN AN ORGANIZED HEALTH CARE EDUCATION/TRAINING PROGRAM

## 2023-08-16 PROCEDURE — 88341 IMHCHEM/IMCYTCHM EA ADD ANTB: CPT | Performed by: STUDENT IN AN ORGANIZED HEALTH CARE EDUCATION/TRAINING PROGRAM

## 2023-08-16 NOTE — TELEPHONE ENCOUNTER
I called Dejah Griggs in response to a referral that was received for patient to establish care with Hematology.      Outreach was made to schedule a consultation.     I left a voicemail explaining the reason for my call and advised patient to call [facility] at [phone].   Another attempt will be made to contact patient.

## 2023-08-17 ENCOUNTER — TELEPHONE (OUTPATIENT)
Dept: HEMATOLOGY ONCOLOGY | Facility: CLINIC | Age: 83
End: 2023-08-17

## 2023-08-17 NOTE — TELEPHONE ENCOUNTER
I called Rajani Valentin in response to a referral that was received for patient to establish care with Hematology. Outreach was made to schedule a consultation. I left a voicemail explaining the reason for my call and advised patient to call [facility] at [phone]. This is the third attempt to schedule patient unsuccessfully. The referral has been closed, a [employer] message has been sent to patient (if applicable) and a letter has been sent to the address on file.

## 2024-01-18 ENCOUNTER — APPOINTMENT (EMERGENCY)
Dept: CT IMAGING | Facility: HOSPITAL | Age: 84
DRG: 811 | End: 2024-01-18
Payer: COMMERCIAL

## 2024-01-18 ENCOUNTER — HOSPITAL ENCOUNTER (INPATIENT)
Facility: HOSPITAL | Age: 84
LOS: 1 days | Discharge: HOME HEALTH | DRG: 811 | End: 2024-01-20
Attending: EMERGENCY MEDICINE | Admitting: HOSPITALIST
Payer: COMMERCIAL

## 2024-01-18 ENCOUNTER — APPOINTMENT (EMERGENCY)
Dept: RADIOLOGY | Facility: HOSPITAL | Age: 84
DRG: 811 | End: 2024-01-18
Payer: COMMERCIAL

## 2024-01-18 DIAGNOSIS — N18.31 STAGE 3A CHRONIC KIDNEY DISEASE (HCC): ICD-10-CM

## 2024-01-18 DIAGNOSIS — G56.02 CARPAL TUNNEL SYNDROME OF LEFT WRIST: ICD-10-CM

## 2024-01-18 DIAGNOSIS — U07.1 COVID: ICD-10-CM

## 2024-01-18 DIAGNOSIS — E03.9 ACQUIRED HYPOTHYROIDISM: ICD-10-CM

## 2024-01-18 DIAGNOSIS — D64.9 SYMPTOMATIC ANEMIA: ICD-10-CM

## 2024-01-18 DIAGNOSIS — R07.9 CHEST PAIN NOT DUE TO ACUTE CORONARY SYNDROME: ICD-10-CM

## 2024-01-18 DIAGNOSIS — R79.89 POSITIVE D DIMER: ICD-10-CM

## 2024-01-18 DIAGNOSIS — D50.9 IRON DEFICIENCY ANEMIA, UNSPECIFIED IRON DEFICIENCY ANEMIA TYPE: ICD-10-CM

## 2024-01-18 DIAGNOSIS — D64.9 SYMPTOMATIC ANEMIA: Primary | ICD-10-CM

## 2024-01-18 DIAGNOSIS — R79.89 ELEVATED TSH: ICD-10-CM

## 2024-01-18 LAB
ABO GROUP BLD: NORMAL
ALBUMIN SERPL BCP-MCNC: 3.9 G/DL (ref 3.5–5)
ALP SERPL-CCNC: 44 U/L (ref 34–104)
ALT SERPL W P-5'-P-CCNC: 7 U/L (ref 7–52)
ANION GAP SERPL CALCULATED.3IONS-SCNC: 9 MMOL/L
APTT PPP: 33 SECONDS (ref 23–37)
AST SERPL W P-5'-P-CCNC: 12 U/L (ref 13–39)
BASOPHILS # BLD AUTO: 0.02 THOUSANDS/ÂΜL (ref 0–0.1)
BASOPHILS NFR BLD AUTO: 0 % (ref 0–1)
BILIRUB SERPL-MCNC: 0.3 MG/DL (ref 0.2–1)
BLD GP AB SCN SERPL QL: NEGATIVE
BUN SERPL-MCNC: 12 MG/DL (ref 5–25)
CALCIUM SERPL-MCNC: 8.9 MG/DL (ref 8.4–10.2)
CARDIAC TROPONIN I PNL SERPL HS: 5 NG/L
CHLORIDE SERPL-SCNC: 107 MMOL/L (ref 96–108)
CO2 SERPL-SCNC: 23 MMOL/L (ref 21–32)
CREAT SERPL-MCNC: 1.06 MG/DL (ref 0.6–1.3)
D DIMER PPP FEU-MCNC: 0.92 UG/ML FEU
EOSINOPHIL # BLD AUTO: 0.03 THOUSAND/ÂΜL (ref 0–0.61)
EOSINOPHIL NFR BLD AUTO: 1 % (ref 0–6)
ERYTHROCYTE [DISTWIDTH] IN BLOOD BY AUTOMATED COUNT: 16.2 % (ref 11.6–15.1)
FLUAV RNA RESP QL NAA+PROBE: NEGATIVE
FLUBV RNA RESP QL NAA+PROBE: NEGATIVE
GFR SERPL CREATININE-BSD FRML MDRD: 48 ML/MIN/1.73SQ M
GLUCOSE SERPL-MCNC: 150 MG/DL (ref 65–140)
HCT VFR BLD AUTO: 26.9 % (ref 34.8–46.1)
HGB BLD-MCNC: 7.5 G/DL (ref 11.5–15.4)
IMM GRANULOCYTES # BLD AUTO: 0.01 THOUSAND/UL (ref 0–0.2)
IMM GRANULOCYTES NFR BLD AUTO: 0 % (ref 0–2)
INR PPP: 1.07 (ref 0.84–1.19)
LYMPHOCYTES # BLD AUTO: 1.17 THOUSANDS/ÂΜL (ref 0.6–4.47)
LYMPHOCYTES NFR BLD AUTO: 25 % (ref 14–44)
MAGNESIUM SERPL-MCNC: 2 MG/DL (ref 1.9–2.7)
MCH RBC QN AUTO: 23.3 PG (ref 26.8–34.3)
MCHC RBC AUTO-ENTMCNC: 27.9 G/DL (ref 31.4–37.4)
MCV RBC AUTO: 84 FL (ref 82–98)
MONOCYTES # BLD AUTO: 0.31 THOUSAND/ÂΜL (ref 0.17–1.22)
MONOCYTES NFR BLD AUTO: 7 % (ref 4–12)
NEUTROPHILS # BLD AUTO: 3.07 THOUSANDS/ÂΜL (ref 1.85–7.62)
NEUTS SEG NFR BLD AUTO: 67 % (ref 43–75)
NRBC BLD AUTO-RTO: 0 /100 WBCS
PLATELET # BLD AUTO: 337 THOUSANDS/UL (ref 149–390)
PMV BLD AUTO: 9.1 FL (ref 8.9–12.7)
POTASSIUM SERPL-SCNC: 3.8 MMOL/L (ref 3.5–5.3)
PROT SERPL-MCNC: 6.1 G/DL (ref 6.4–8.4)
PROTHROMBIN TIME: 13.8 SECONDS (ref 11.6–14.5)
RBC # BLD AUTO: 3.22 MILLION/UL (ref 3.81–5.12)
RH BLD: POSITIVE
RSV RNA RESP QL NAA+PROBE: NEGATIVE
SARS-COV-2 RNA RESP QL NAA+PROBE: POSITIVE
SODIUM SERPL-SCNC: 139 MMOL/L (ref 135–147)
SPECIMEN EXPIRATION DATE: NORMAL
TSH SERPL DL<=0.05 MIU/L-ACNC: 10.98 UIU/ML (ref 0.45–4.5)
WBC # BLD AUTO: 4.61 THOUSAND/UL (ref 4.31–10.16)

## 2024-01-18 PROCEDURE — P9016 RBC LEUKOCYTES REDUCED: HCPCS

## 2024-01-18 PROCEDURE — 85610 PROTHROMBIN TIME: CPT | Performed by: EMERGENCY MEDICINE

## 2024-01-18 PROCEDURE — 85025 COMPLETE CBC W/AUTO DIFF WBC: CPT | Performed by: EMERGENCY MEDICINE

## 2024-01-18 PROCEDURE — 93005 ELECTROCARDIOGRAM TRACING: CPT

## 2024-01-18 PROCEDURE — 30233N1 TRANSFUSION OF NONAUTOLOGOUS RED BLOOD CELLS INTO PERIPHERAL VEIN, PERCUTANEOUS APPROACH: ICD-10-PCS | Performed by: EMERGENCY MEDICINE

## 2024-01-18 PROCEDURE — 86923 COMPATIBILITY TEST ELECTRIC: CPT

## 2024-01-18 PROCEDURE — 82728 ASSAY OF FERRITIN: CPT | Performed by: EMERGENCY MEDICINE

## 2024-01-18 PROCEDURE — 84484 ASSAY OF TROPONIN QUANT: CPT | Performed by: EMERGENCY MEDICINE

## 2024-01-18 PROCEDURE — 96374 THER/PROPH/DIAG INJ IV PUSH: CPT

## 2024-01-18 PROCEDURE — 74177 CT ABD & PELVIS W/CONTRAST: CPT

## 2024-01-18 PROCEDURE — 36430 TRANSFUSION BLD/BLD COMPNT: CPT

## 2024-01-18 PROCEDURE — 86900 BLOOD TYPING SEROLOGIC ABO: CPT | Performed by: EMERGENCY MEDICINE

## 2024-01-18 PROCEDURE — 83540 ASSAY OF IRON: CPT | Performed by: EMERGENCY MEDICINE

## 2024-01-18 PROCEDURE — 99285 EMERGENCY DEPT VISIT HI MDM: CPT

## 2024-01-18 PROCEDURE — 84443 ASSAY THYROID STIM HORMONE: CPT | Performed by: EMERGENCY MEDICINE

## 2024-01-18 PROCEDURE — 85379 FIBRIN DEGRADATION QUANT: CPT | Performed by: EMERGENCY MEDICINE

## 2024-01-18 PROCEDURE — 36415 COLL VENOUS BLD VENIPUNCTURE: CPT | Performed by: EMERGENCY MEDICINE

## 2024-01-18 PROCEDURE — 85730 THROMBOPLASTIN TIME PARTIAL: CPT | Performed by: EMERGENCY MEDICINE

## 2024-01-18 PROCEDURE — 80053 COMPREHEN METABOLIC PANEL: CPT | Performed by: EMERGENCY MEDICINE

## 2024-01-18 PROCEDURE — 0241U HB NFCT DS VIR RESP RNA 4 TRGT: CPT | Performed by: EMERGENCY MEDICINE

## 2024-01-18 PROCEDURE — 99285 EMERGENCY DEPT VISIT HI MDM: CPT | Performed by: EMERGENCY MEDICINE

## 2024-01-18 PROCEDURE — 71275 CT ANGIOGRAPHY CHEST: CPT

## 2024-01-18 PROCEDURE — 86901 BLOOD TYPING SEROLOGIC RH(D): CPT | Performed by: EMERGENCY MEDICINE

## 2024-01-18 PROCEDURE — G1004 CDSM NDSC: HCPCS

## 2024-01-18 PROCEDURE — 83735 ASSAY OF MAGNESIUM: CPT | Performed by: EMERGENCY MEDICINE

## 2024-01-18 PROCEDURE — 83550 IRON BINDING TEST: CPT | Performed by: EMERGENCY MEDICINE

## 2024-01-18 PROCEDURE — 86850 RBC ANTIBODY SCREEN: CPT | Performed by: EMERGENCY MEDICINE

## 2024-01-18 PROCEDURE — 84439 ASSAY OF FREE THYROXINE: CPT | Performed by: EMERGENCY MEDICINE

## 2024-01-18 PROCEDURE — 71045 X-RAY EXAM CHEST 1 VIEW: CPT

## 2024-01-18 RX ORDER — ONDANSETRON 2 MG/ML
4 INJECTION INTRAMUSCULAR; INTRAVENOUS ONCE
Status: COMPLETED | OUTPATIENT
Start: 2024-01-18 | End: 2024-01-18

## 2024-01-18 RX ADMIN — IOHEXOL 100 ML: 350 INJECTION, SOLUTION INTRAVENOUS at 22:11

## 2024-01-18 RX ADMIN — ONDANSETRON 4 MG: 2 INJECTION INTRAMUSCULAR; INTRAVENOUS at 21:54

## 2024-01-19 PROBLEM — U07.1 COVID-19: Status: ACTIVE | Noted: 2024-01-19

## 2024-01-19 LAB
2HR DELTA HS TROPONIN: 1 NG/L
4HR DELTA HS TROPONIN: 3 NG/L
ABO GROUP BLD BPU: NORMAL
ATRIAL RATE: 81 BPM
BACTERIA UR QL AUTO: ABNORMAL /HPF
BASOPHILS # BLD AUTO: 0.02 THOUSANDS/ÂΜL (ref 0–0.1)
BASOPHILS NFR BLD AUTO: 0 % (ref 0–1)
BILIRUB UR QL STRIP: NEGATIVE
BPU ID: NORMAL
CARDIAC TROPONIN I PNL SERPL HS: 6 NG/L
CARDIAC TROPONIN I PNL SERPL HS: 8 NG/L
CLARITY UR: CLEAR
COLOR UR: ABNORMAL
CROSSMATCH: NORMAL
EOSINOPHIL # BLD AUTO: 0.03 THOUSAND/ÂΜL (ref 0–0.61)
EOSINOPHIL NFR BLD AUTO: 1 % (ref 0–6)
ERYTHROCYTE [DISTWIDTH] IN BLOOD BY AUTOMATED COUNT: 16.4 % (ref 11.6–15.1)
FERRITIN SERPL-MCNC: 5 NG/ML (ref 11–307)
FOLATE SERPL-MCNC: 6.7 NG/ML
GLUCOSE UR STRIP-MCNC: NEGATIVE MG/DL
HCT VFR BLD AUTO: 33.4 % (ref 34.8–46.1)
HGB BLD-MCNC: 9.4 G/DL (ref 11.5–15.4)
HGB UR QL STRIP.AUTO: NEGATIVE
IMM GRANULOCYTES # BLD AUTO: 0.02 THOUSAND/UL (ref 0–0.2)
IMM GRANULOCYTES NFR BLD AUTO: 0 % (ref 0–2)
IRON SATN MFR SERPL: 6 % (ref 15–50)
IRON SERPL-MCNC: 21 UG/DL (ref 50–212)
KETONES UR STRIP-MCNC: NEGATIVE MG/DL
LEUKOCYTE ESTERASE UR QL STRIP: ABNORMAL
LYMPHOCYTES # BLD AUTO: 0.96 THOUSANDS/ÂΜL (ref 0.6–4.47)
LYMPHOCYTES NFR BLD AUTO: 20 % (ref 14–44)
MCH RBC QN AUTO: 23.7 PG (ref 26.8–34.3)
MCHC RBC AUTO-ENTMCNC: 28.1 G/DL (ref 31.4–37.4)
MCV RBC AUTO: 84 FL (ref 82–98)
MONOCYTES # BLD AUTO: 0.34 THOUSAND/ÂΜL (ref 0.17–1.22)
MONOCYTES NFR BLD AUTO: 7 % (ref 4–12)
NEUTROPHILS # BLD AUTO: 3.51 THOUSANDS/ÂΜL (ref 1.85–7.62)
NEUTS SEG NFR BLD AUTO: 72 % (ref 43–75)
NITRITE UR QL STRIP: NEGATIVE
NON-SQ EPI CELLS URNS QL MICRO: ABNORMAL /HPF
NRBC BLD AUTO-RTO: 0 /100 WBCS
P AXIS: 71 DEGREES
PH UR STRIP.AUTO: 6.5 [PH]
PLATELET # BLD AUTO: 316 THOUSANDS/UL (ref 149–390)
PMV BLD AUTO: 8.7 FL (ref 8.9–12.7)
PR INTERVAL: 154 MS
PROT UR STRIP-MCNC: NEGATIVE MG/DL
QRS AXIS: 61 DEGREES
QRSD INTERVAL: 78 MS
QT INTERVAL: 366 MS
QTC INTERVAL: 425 MS
RBC # BLD AUTO: 3.97 MILLION/UL (ref 3.81–5.12)
RBC #/AREA URNS AUTO: ABNORMAL /HPF
SP GR UR STRIP.AUTO: 1.01 (ref 1–1.03)
T WAVE AXIS: 57 DEGREES
T4 FREE SERPL-MCNC: 0.54 NG/DL (ref 0.61–1.12)
TIBC SERPL-MCNC: 371 UG/DL (ref 250–450)
UIBC SERPL-MCNC: 350 UG/DL (ref 155–355)
UNIT DISPENSE STATUS: NORMAL
UNIT PRODUCT CODE: NORMAL
UNIT PRODUCT VOLUME: 350 ML
UNIT RH: NORMAL
UROBILINOGEN UR QL STRIP.AUTO: 0.2 E.U./DL
VENTRICULAR RATE: 81 BPM
VIT B12 SERPL-MCNC: 276 PG/ML (ref 180–914)
WBC # BLD AUTO: 4.88 THOUSAND/UL (ref 4.31–10.16)
WBC #/AREA URNS AUTO: ABNORMAL /HPF

## 2024-01-19 PROCEDURE — 36415 COLL VENOUS BLD VENIPUNCTURE: CPT | Performed by: EMERGENCY MEDICINE

## 2024-01-19 PROCEDURE — 84484 ASSAY OF TROPONIN QUANT: CPT | Performed by: EMERGENCY MEDICINE

## 2024-01-19 PROCEDURE — 97167 OT EVAL HIGH COMPLEX 60 MIN: CPT

## 2024-01-19 PROCEDURE — 84484 ASSAY OF TROPONIN QUANT: CPT | Performed by: INTERNAL MEDICINE

## 2024-01-19 PROCEDURE — 85025 COMPLETE CBC W/AUTO DIFF WBC: CPT | Performed by: PHYSICIAN ASSISTANT

## 2024-01-19 PROCEDURE — 82607 VITAMIN B-12: CPT | Performed by: INTERNAL MEDICINE

## 2024-01-19 PROCEDURE — 99223 1ST HOSP IP/OBS HIGH 75: CPT | Performed by: INTERNAL MEDICINE

## 2024-01-19 PROCEDURE — 97163 PT EVAL HIGH COMPLEX 45 MIN: CPT

## 2024-01-19 PROCEDURE — 82746 ASSAY OF FOLIC ACID SERUM: CPT | Performed by: INTERNAL MEDICINE

## 2024-01-19 PROCEDURE — 81001 URINALYSIS AUTO W/SCOPE: CPT | Performed by: INTERNAL MEDICINE

## 2024-01-19 RX ORDER — HYDROCODONE BITARTRATE AND ACETAMINOPHEN 5; 325 MG/1; MG/1
1 TABLET ORAL 3 TIMES DAILY PRN
Status: DISCONTINUED | OUTPATIENT
Start: 2024-01-19 | End: 2024-01-20 | Stop reason: HOSPADM

## 2024-01-19 RX ORDER — ACETAMINOPHEN 325 MG/1
650 TABLET ORAL EVERY 4 HOURS PRN
Status: DISCONTINUED | OUTPATIENT
Start: 2024-01-19 | End: 2024-01-20 | Stop reason: HOSPADM

## 2024-01-19 RX ORDER — ATORVASTATIN CALCIUM 10 MG/1
20 TABLET, FILM COATED ORAL
Status: DISCONTINUED | OUTPATIENT
Start: 2024-01-19 | End: 2024-01-20 | Stop reason: HOSPADM

## 2024-01-19 RX ORDER — LEVOTHYROXINE SODIUM 0.07 MG/1
75 TABLET ORAL DAILY
Status: DISCONTINUED | OUTPATIENT
Start: 2024-01-19 | End: 2024-01-19

## 2024-01-19 RX ORDER — OXYMETAZOLINE HYDROCHLORIDE 0.05 G/100ML
2 SPRAY NASAL EVERY 12 HOURS PRN
Status: DISPENSED | OUTPATIENT
Start: 2024-01-19 | End: 2024-01-20

## 2024-01-19 RX ORDER — ONDANSETRON 2 MG/ML
4 INJECTION INTRAMUSCULAR; INTRAVENOUS EVERY 6 HOURS PRN
Status: DISCONTINUED | OUTPATIENT
Start: 2024-01-19 | End: 2024-01-20 | Stop reason: HOSPADM

## 2024-01-19 RX ORDER — LANOLIN ALCOHOL/MO/W.PET/CERES
3 CREAM (GRAM) TOPICAL
Status: DISCONTINUED | OUTPATIENT
Start: 2024-01-19 | End: 2024-01-20 | Stop reason: HOSPADM

## 2024-01-19 RX ORDER — PANTOPRAZOLE SODIUM 40 MG/1
40 TABLET, DELAYED RELEASE ORAL DAILY
Status: DISCONTINUED | OUTPATIENT
Start: 2024-01-19 | End: 2024-01-20 | Stop reason: HOSPADM

## 2024-01-19 RX ORDER — SUCRALFATE ORAL 1 G/10ML
1 SUSPENSION ORAL 2 TIMES DAILY
Status: DISCONTINUED | OUTPATIENT
Start: 2024-01-19 | End: 2024-01-20 | Stop reason: HOSPADM

## 2024-01-19 RX ORDER — LEVOTHYROXINE SODIUM 88 UG/1
88 TABLET ORAL
Status: DISCONTINUED | OUTPATIENT
Start: 2024-01-19 | End: 2024-01-20 | Stop reason: HOSPADM

## 2024-01-19 RX ORDER — HYDROCODONE POLISTIREX AND CHLORPHENIRAMINE POLISTIREX 10; 8 MG/5ML; MG/5ML
5 SUSPENSION, EXTENDED RELEASE ORAL EVERY 12 HOURS PRN
Status: DISCONTINUED | OUTPATIENT
Start: 2024-01-19 | End: 2024-01-20 | Stop reason: HOSPADM

## 2024-01-19 RX ADMIN — LEVOTHYROXINE SODIUM 88 MCG: 88 TABLET ORAL at 05:25

## 2024-01-19 RX ADMIN — HYDROCODONE POLISTIREX AND CHLORPHENIRAMINE POLISTIREX 5 ML: 10; 8 SUSPENSION, EXTENDED RELEASE ORAL at 12:39

## 2024-01-19 RX ADMIN — ATORVASTATIN CALCIUM 20 MG: 10 TABLET, FILM COATED ORAL at 17:07

## 2024-01-19 RX ADMIN — SUCRALFATE 1 G: 1 SUSPENSION ORAL at 17:07

## 2024-01-19 RX ADMIN — OXYMETAZOLINE HYDROCHLORIDE 2 SPRAY: 0.05 SPRAY NASAL at 12:39

## 2024-01-19 RX ADMIN — CYANOCOBALAMIN TAB 1000 MCG 1000 MCG: 1000 TAB at 08:21

## 2024-01-19 RX ADMIN — IRON SUCROSE 200 MG: 20 INJECTION, SOLUTION INTRAVENOUS at 08:21

## 2024-01-19 RX ADMIN — Medication 3 MG: at 20:59

## 2024-01-19 RX ADMIN — SUCRALFATE 1 G: 1 SUSPENSION ORAL at 08:21

## 2024-01-19 RX ADMIN — Medication 3 MG: at 02:24

## 2024-01-19 RX ADMIN — PANTOPRAZOLE SODIUM 40 MG: 40 TABLET, DELAYED RELEASE ORAL at 08:21

## 2024-01-19 RX ADMIN — HYDROCODONE BITARTRATE AND ACETAMINOPHEN 1 TABLET: 5; 325 TABLET ORAL at 19:01

## 2024-01-19 NOTE — PHYSICAL THERAPY NOTE
"                                                      Physical Therapy Evaluation    Patient Name: Charlee Botello    Today's Date: 1/19/2024     Problem List  Principal Problem:    Symptomatic anemia  Active Problems:    Dyslipidemia    Acquired hypothyroidism    Hiatal hernia with GERD without esophagitis    COVID-19       Past Medical History  Past Medical History:   Diagnosis Date    Acquired renal cyst of left kidney 08/09/2023    Arthritis     Breast cancer (HCC)     Cancer (HCC)     Diabetes mellitus (HCC)     Disease of thyroid gland     Generalized osteoarthritis 08/09/2023    Hiatal hernia     Hiatal hernia with GERD without esophagitis 04/16/2021    History of Helicobacter pylori infection 08/09/2023    Hypertension     Stage 3b chronic kidney disease (HCC) 08/09/2023    Type 2 diabetes mellitus with stage 3b chronic kidney disease, without long-term current use of insulin (HCC) 06/30/2021        Past Surgical History  Past Surgical History:   Procedure Laterality Date    ABDOMINAL SURGERY      states in maybe 40s-50s; \"my intestines were wrapped around each other\"    APPENDECTOMY      CHOLECYSTECTOMY      TONSILLECTOMY             01/19/24 0855   PT Last Visit   PT Visit Date 01/19/24   Note Type   Note type Evaluation   Pain Assessment   Pain Assessment Tool 0-10   Pain Score No Pain   Restrictions/Precautions   Weight Bearing Precautions Per Order No   Other Precautions Fall Risk;Multiple lines;Contact/isolation;Airborne/isolation   Home Living   Type of Home House   Home Layout Two level;Bed/bath upstairs  (0 ACE)   Bathroom Shower/Tub Tub/shower unit   Bathroom Toilet Standard   Bathroom Equipment Grab bars in shower;Grab bars around toilet;Commode   Bathroom Accessibility Accessible   Home Equipment Walker;Cane   Additional Comments pt denies use of DME at baseline   Prior Function   Level of [place] Independent with ADLs;Independent with functional mobility;Independent with IADLS   Lives With Son "   Receives Help From Family   IADLs Independent with driving   Falls in the last 6 months 0   Vocational Retired   General   Family/Caregiver Present No   Cognition   Overall Cognitive Status WFL   Arousal/Participation Alert   Orientation Level Oriented X4   Following Commands Follows all commands and directions without difficulty   Subjective   Subjective pt pleasant and cooperative; agreeable to PT evaluation   RLE Assessment   RLE Assessment WFL  (4-/5 grossly)   LLE Assessment   LLE Assessment WFL  (4-/5 grossly)   Bed Mobility   Supine to Sit 5  Supervision   Additional items Increased time required;Verbal cues   Sit to Supine   (pt OOB at end of session)   Transfers   Sit to Stand 5  Supervision   Additional items Increased time required;Verbal cues   Stand to Sit 5  Supervision   Additional items Increased time required;Verbal cues   Additional Comments RW used   Ambulation/Elevation   Gait pattern Short stride;Foward flexed;Inconsistent donny;Narrow PAWAN   Gait Assistance 5  Supervision   Additional items Verbal cues   Assistive Device None   Distance 250'   Balance   Static Sitting Good   Dynamic Sitting Good   Static Standing Fair +   Dynamic Standing Fair +   Ambulatory Fair   Endurance Deficit   Endurance Deficit Yes   Endurance Deficit Description pt reports SOB with increasing ambulation   Activity Tolerance   Activity Tolerance Patient limited by fatigue   Assessment   Prognosis Good   Problem List Decreased strength;Decreased endurance;Impaired balance;Decreased mobility   Assessment Patient is a 83 y.o. female evaluated by Physical Therapy s/p admit to [facility] on [facility]24 with admitting diagnosis of: Chest pain, Positive D dimer, Elevated TSH, Symptomatic anemia, Stage 3a chronic kidney disease, Chest pain not due to acute coronary syndrome, COVID, and principal problem of: Symptomatic anemia. PT was consulted to assess patient's functional mobility and discharge needs. Ordered  are PT Evaluation and treatment with activity level of: activity as tolerated. Comorbidities affecting patient's physical performance at time of assessment include:  dyslipidemia, hypothyroidism, anemia, hiatal hernia, GERD, HTN, DM, moderate protein-calorie malnutrition, CKD, osteoarthritis . Personal factors affecting the patient at time of IE include: lives in 2 story home and inability/difficulty performing IADLs. Please locate objective findings from PT assessment regarding body systems outlined above. Upon evaluation, pt able to perform all functional mobility with SUP, increased time, and occasional verbal cuing. Pt demonstrating moderate postural sway with 250' of ambulation, but no true LOB experienced. Pt endorses SOB and fatigue with increasing ambulation, however HR and SpO2 remained WFL on RA throughout. The patient's AM-PAC Basic Mobility Inpatient Short Form Raw Score is 20. A Raw score of greater than 16 suggests the patient may benefit from discharge to home. Please also refer to the recommendation of the Physical Therapist for safe discharge planning. Co treatment with OT secondary to complex medical condition of pt, possible A of 2 required to achieve and maintain transitional movements, requiring the need of skilled therapeutic intervention of 2 therapists to achieve delivery of services. Pt will benefit from continued PT intervention during LOS to address current deficits, increase LOF, and facilitate safe d/c to next level of care when medically appropriate. D/c recommendation at this time is rehabilitation resource intensity level III.   Goals   Patient Goals to go home   LTG Expiration Date 02/02/24   Long Term Goal #1 Pt will participate in B LE strengthening exercises to facilitate improved functional activity tolerance. Pt will perform all functional transfers and bed mobility mod(I) with good safety awareness. Pt will ambulate 450' mod(I) with LRAD while maintaining good functional  dynamic balance. Pt will ascend/descend a FFOS with HR and SUP to reflect the ability to safely navigate the home.   Plan   Treatment/Interventions Functional transfer training;LE strengthening/ROM;Elevations;Therapeutic exercise;Endurance training;Bed mobility;Gait training   PT Frequency 3-5x/wk   Discharge Recommendation   Rehab Resource Intensity Level, PT III (Minimum Resource Intensity)   AM-PAC Basic Mobility Inpatient   Turning in Flat Bed Without Bedrails 4   Lying on Back to Sitting on Edge of Flat Bed Without Bedrails 4   Moving Bed to Chair 3   Standing Up From Chair Using Arms 3   Walk in Room 3   Climb 3-5 Stairs With Railing 3   Basic Mobility Inpatient Raw Score 20   Basic Mobility Standardized Score 43.99   Highest Level Of Mobility   JH-HLM Goal 6: Walk 10 steps or more   JH-HLM Achieved 8: Walk 250 feet ot more   End of Consult   Patient Position at End of Consult Bedside chair;Bed/Chair alarm activated;All needs within reach

## 2024-01-19 NOTE — PLAN OF CARE
Problem: PHYSICAL THERAPY ADULT  Goal: Performs mobility at highest level of function for planned discharge setting.  See evaluation for individualized goals.  Description: Treatment/Interventions: Functional transfer training, LE strengthening/ROM, Elevations, Therapeutic exercise, Endurance training, Bed mobility, Gait training          See flowsheet documentation for full assessment, interventions and recommendations.  Note: Prognosis: Good  Problem List: Decreased strength, Decreased endurance, Impaired balance, Decreased mobility  Assessment: Patient is a 83 y.o. female evaluated by Physical Therapy s/p admit to [facility] on 1/18/2024 with admitting diagnosis of: Chest pain, Positive D dimer, Elevated TSH, Symptomatic anemia, Stage 3a chronic kidney disease, Chest pain not due to acute coronary syndrome, COVID, and principal problem of: Symptomatic anemia. PT was consulted to assess patient's functional mobility and discharge needs. Ordered are PT Evaluation and treatment with activity level of: activity as tolerated. Comorbidities affecting patient's physical performance at time of assessment include:  dyslipidemia, hypothyroidism, anemia, hiatal hernia, GERD, HTN, DM, moderate protein-calorie malnutrition, CKD, osteoarthritis . Personal factors affecting the patient at time of IE include: lives in 2 story home and inability/difficulty performing IADLs. Please locate objective findings from PT assessment regarding body systems outlined above. Upon evaluation, pt able to perform all functional mobility with SUP, increased time, and occasional verbal cuing. Pt demonstrating moderate postural sway with 250' of ambulation, but no true LOB experienced. Pt endorses SOB and fatigue with increasing ambulation, however HR and SpO2 remained WFL on RA throughout. The patient's AM-PAC Basic Mobility Inpatient Short Form Raw Score is 20. A Raw score of greater than 16 suggests the patient may benefit from  discharge to home. Please also refer to the recommendation of the Physical Therapist for safe discharge planning. Co treatment with OT secondary to complex medical condition of pt, possible A of 2 required to achieve and maintain transitional movements, requiring the need of skilled therapeutic intervention of 2 therapists to achieve delivery of services. Pt will benefit from continued PT intervention during LOS to address current deficits, increase LOF, and facilitate safe d/c to next level of care when medically appropriate. D/c recommendation at this time is rehabilitation resource intensity level III.        Rehab Resource Intensity Level, PT: III (Minimum Resource Intensity)    See flowsheet documentation for full assessment.

## 2024-01-19 NOTE — PLAN OF CARE
Problem: Potential for Falls  Goal: Patient will remain free of falls  Description: INTERVENTIONS:  - Educate patient/family on patient safety including physical limitations  - Instruct patient to call for assistance with activity   - Consult OT/PT to assist with strengthening/mobility   - Keep Call bell within reach  - Keep bed low and locked with side rails adjusted as appropriate  - Keep care items and personal belongings within reach  - Initiate and maintain comfort rounds  - Make Fall Risk Sign visible to staff  - Offer Toileting every 2 Hours, in advance of need  - Initiate/Maintain fall alarm  - Obtain necessary fall risk management equipment: non-skid footwear  - Apply yellow socks and bracelet for high fall risk patients  - Consider moving patient to room near nurses station  Outcome: Progressing     Problem: PAIN - ADULT  Goal: Verbalizes/displays adequate comfort level or baseline comfort level  Description: Interventions:  - Encourage patient to monitor pain and request assistance  - Assess pain using appropriate pain scale  - Administer analgesics based on type and severity of pain and evaluate response  - Implement non-pharmacological measures as appropriate and evaluate response  - Consider cultural and social influences on pain and pain management  - Notify physician/advanced practitioner if interventions unsuccessful or patient reports new pain  Outcome: Progressing     Problem: INFECTION - ADULT  Goal: Absence or prevention of progression during hospitalization  Description: INTERVENTIONS:  - Assess and monitor for signs and symptoms of infection  - Monitor lab/diagnostic results  - Monitor all insertion sites, i.e. indwelling lines, tubes, and drains  - Monitor endotracheal if appropriate and nasal secretions for changes in amount and color  - [place] appropriate cooling/warming therapies per order  - Administer medications as ordered  - Instruct and encourage patient and family to use good  hand hygiene technique  - Identify and instruct in appropriate isolation precautions for identified infection/condition  Outcome: Progressing  Goal: Absence of fever/infection during neutropenic period  Description: INTERVENTIONS:  - Monitor WBC    Outcome: Progressing     Problem: SAFETY ADULT  Goal: Patient will remain free of falls  Description: INTERVENTIONS:  - Educate patient/family on patient safety including physical limitations  - Instruct patient to call for assistance with activity   - Consult OT/PT to assist with strengthening/mobility   - Keep Call bell within reach  - Keep bed low and locked with side rails adjusted as appropriate  - Keep care items and personal belongings within reach  - Initiate and maintain comfort rounds  - Make Fall Risk Sign visible to staff  - Offer Toileting every 2 Hours, in advance of need  - Initiate/Maintain fall alarm  - Obtain necessary fall risk management equipment: non-skid footwear  - Apply yellow socks and bracelet for high fall risk patients  - Consider moving patient to room near nurses station  Outcome: Progressing  Goal: Maintain or return to baseline ADL function  Description: INTERVENTIONS:  -  Assess patient's ability to carry out ADLs; assess patient's baseline for ADL function and identify physical deficits which impact ability to perform ADLs (bathing, care of mouth/teeth, toileting, grooming, dressing, etc.)  - Assess/evaluate cause of self-care deficits   - Assess range of motion  - Assess patient's mobility; develop plan if impaired  - Assess patient's need for assistive devices and provide as appropriate  - Encourage maximum independence but intervene and supervise when necessary  - Involve family in performance of ADLs  - Assess for home care needs following discharge   - Consider OT consult to assist with ADL evaluation and planning for discharge  - Provide patient education as appropriate  Outcome: Progressing  Goal: Maintains/Returns to pre  admission functional level  Description: INTERVENTIONS:  - Perform AM-PAC 6 Click Basic Mobility/ Daily Activity assessment daily.  - Set and communicate daily mobility goal to care team and patient/family/caregiver.   - Collaborate with rehabilitation services on mobility goals if consulted  - Perform Range of Motion 3 times a day.  - Reposition patient every 2 hours.  - Dangle patient 3 times a day  - Stand patient 3 times a day  - Ambulate patient 3 times a day  - Out of bed to chair 3 times a day   - Out of bed for meals 3 times a day  - Out of bed for toileting  - Record patient progress and toleration of activity level   Outcome: Progressing     Problem: DISCHARGE PLANNING  Goal: Discharge to home or other facility with appropriate resources  Description: INTERVENTIONS:  - Identify barriers to discharge w/patient and caregiver  - Arrange for needed discharge resources and transportation as appropriate  - Identify discharge learning needs (meds, wound care, etc.)  - Arrange for interpretive services to assist at discharge as needed  - Refer to Case Management Department for coordinating discharge planning if the patient needs post-hospital services based on physician/advanced practitioner order or complex needs related to functional status, cognitive ability, or social support system  Outcome: Progressing     Problem: Knowledge Deficit  Goal: Patient/family/caregiver demonstrates understanding of disease process, treatment plan, medications, and discharge instructions  Description: Complete learning assessment and assess knowledge base.  Interventions:  - Provide teaching at level of understanding  - Provide teaching via preferred learning methods  Outcome: Progressing     Problem: CARDIOVASCULAR - ADULT  Goal: Maintains optimal cardiac output and hemodynamic stability  Description: INTERVENTIONS:  - Monitor I/O, vital signs and rhythm  - Monitor for S/S and trends of decreased cardiac output  - Administer and  titrate ordered vasoactive medications to optimize hemodynamic stability  - Assess quality of pulses, skin color and temperature  - Assess for signs of decreased coronary artery perfusion  - Instruct patient to report change in severity of symptoms  Outcome: Progressing  Goal: Absence of cardiac dysrhythmias or at baseline rhythm  Description: INTERVENTIONS:  - Continuous cardiac monitoring, vital signs, obtain 12 lead EKG if ordered  - Administer antiarrhythmic and heart rate control medications as ordered  - Monitor electrolytes and administer replacement therapy as ordered  Outcome: Progressing     Problem: RESPIRATORY - ADULT  Goal: Achieves optimal ventilation and oxygenation  Description: INTERVENTIONS:  - Assess for changes in respiratory status  - Assess for changes in mentation and behavior  - Position to facilitate oxygenation and minimize respiratory effort  - Oxygen administered by appropriate delivery if ordered  - Initiate smoking cessation education as indicated  - Encourage broncho-pulmonary hygiene including cough, deep breathe, Incentive Spirometry  - Assess the need for suctioning and aspirate as needed  - Assess and instruct to report SOB or any respiratory difficulty  - Respiratory Therapy support as indicated  Outcome: Progressing     Problem: GASTROINTESTINAL - ADULT  Goal: Minimal or absence of nausea and/or vomiting  Description: INTERVENTIONS:  - Administer IV fluids if ordered to ensure adequate hydration  - Maintain NPO status until nausea and vomiting are resolved  - Nasogastric tube if ordered  - Administer ordered antiemetic medications as needed  - Provide nonpharmacologic comfort measures as appropriate  - Advance diet as tolerated, if ordered  - Consider nutrition services referral to assist patient with adequate nutrition and appropriate food choices  Outcome: Progressing  Goal: Maintains or returns to baseline bowel function  Description: INTERVENTIONS:  - Assess bowel function  -  Encourage oral fluids to ensure adequate hydration  - Administer IV fluids if ordered to ensure adequate hydration  - Administer ordered medications as needed  - Encourage mobilization and activity  - Consider nutritional services referral to assist patient with adequate nutrition and appropriate food choices  Outcome: Progressing  Goal: Maintains adequate nutritional intake  Description: INTERVENTIONS:  - Monitor percentage of each meal consumed  - Identify factors contributing to decreased intake, treat as appropriate  - Assist with meals as needed  - Monitor I&O, weight, and lab values if indicated  - Obtain nutrition services referral as needed  Outcome: Progressing  Goal: Oral mucous membranes remain intact  Description: INTERVENTIONS  - Assess oral mucosa and hygiene practices  - Implement preventative oral hygiene regimen  - Implement oral medicated treatments as ordered  - Initiate Nutrition services referral as needed  Outcome: Progressing     Problem: METABOLIC, FLUID AND ELECTROLYTES - ADULT  Goal: Electrolytes maintained within normal limits  Description: INTERVENTIONS:  - Monitor labs and assess patient for signs and symptoms of electrolyte imbalances  - Administer electrolyte replacement as ordered  - Monitor response to electrolyte replacements, including repeat lab results as appropriate  - Instruct patient on fluid and nutrition as appropriate  Outcome: Progressing  Goal: Fluid balance maintained  Description: INTERVENTIONS:  - Monitor labs   - Monitor I/O and WT  - Instruct patient on fluid and nutrition as appropriate  - Assess for signs & symptoms of volume excess or deficit  Outcome: Progressing  Goal: Glucose maintained within target range  Description: INTERVENTIONS:  - Monitor Blood Glucose as ordered  - Assess for signs and symptoms of hyperglycemia and hypoglycemia  - Administer ordered medications to maintain glucose within target range  - Assess nutritional intake and initiate nutrition  service referral as needed  Outcome: Progressing     Problem: HEMATOLOGIC - ADULT  Goal: Maintains hematologic stability  Description: INTERVENTIONS  - Assess for signs and symptoms of bleeding or hemorrhage  - Monitor labs  - Administer supportive blood products/factors as ordered and appropriate  Outcome: Progressing     Problem: MUSCULOSKELETAL - ADULT  Goal: Maintain or return mobility to safest level of function  Description: INTERVENTIONS:  - Assess patient's ability to carry out ADLs; assess patient's baseline for ADL function and identify physical deficits which impact ability to perform ADLs (bathing, care of mouth/teeth, toileting, grooming, dressing, etc.)  - Assess/evaluate cause of self-care deficits   - Assess range of motion  - Assess patient's mobility  - Assess patient's need for assistive devices and provide as appropriate  - Encourage maximum independence but intervene and supervise when necessary  - Involve family in performance of ADLs  - Assess for home care needs following discharge   - Consider OT consult to assist with ADL evaluation and planning for discharge  - Provide patient education as appropriate  Outcome: Progressing  Goal: Maintain proper alignment of affected body part  Description: INTERVENTIONS:  - Support, maintain and protect limb and body alignment  - Provide patient/ family with appropriate education  Outcome: Progressing

## 2024-01-19 NOTE — OCCUPATIONAL THERAPY NOTE
"    Occupational Therapy Evaluation     Patient Name: Charlee Botello  Today's Date: 1/19/2024  Problem List  Principal Problem:    Symptomatic anemia  Active Problems:    Dyslipidemia    Acquired hypothyroidism    Hiatal hernia with GERD without esophagitis    COVID-19    Past Medical History  Past Medical History:   Diagnosis Date    Acquired renal cyst of left kidney 08/09/2023    Arthritis     Breast cancer (HCC)     Cancer (HCC)     Diabetes mellitus (HCC)     Disease of thyroid gland     Generalized osteoarthritis 08/09/2023    Hiatal hernia     Hiatal hernia with GERD without esophagitis 04/16/2021    History of Helicobacter pylori infection 08/09/2023    Hypertension     Stage 3b chronic kidney disease (HCC) 08/09/2023    Type 2 diabetes mellitus with stage 3b chronic kidney disease, without long-term current use of insulin (HCC) 06/30/2021     Past Surgical History  Past Surgical History:   Procedure Laterality Date    ABDOMINAL SURGERY      states in maybe 40s-50s; \"my intestines were wrapped around each other\"    APPENDECTOMY      CHOLECYSTECTOMY      TONSILLECTOMY             01/19/24 0854   OT Last Visit   OT Visit Date 01/19/24   Note Type   Note type Evaluation   Pain Assessment   Pain Score No Pain   Restrictions/Precautions   Weight Bearing Precautions Per Order No   Other Precautions Fall Risk;Contact/isolation;Airborne/isolation;Chair Alarm;Bed Alarm;Multiple lines   Home Living   Type of Home House   Home Layout Two level;Bed/bath upstairs;Other (Comment)  (0 ACE; FS to 2nd c HR)   Bathroom Shower/Tub Tub/shower unit   Bathroom Toilet Standard   Bathroom Equipment Grab bars in shower;Grab bars around toilet;Commode   Home Equipment Walker;Cane;Grab bars   Additional Comments pt performs functional mobility at baseline with no device   Prior Function   Level of [place] Independent with ADLs;Independent with functional mobility;Needs assistance with IADLS   Lives With Son   Receives Help From " "Family   IADLs Independent with driving   Falls in the last 6 months 0   Vocational Retired   Subjective   Subjective \"I can't believe I got this\"   ADL   Where Assessed Edge of bed   LB Dressing Assistance 5  Supervision/Setup   LB Dressing Deficit Don/doff R sock;Don/doff L sock   Additional Comments pt performs LB dressing seated EOB; no significant difficulties   Bed Mobility   Supine to Sit 5  Supervision   Additional items Increased time required;Verbal cues;Bedrails   Sit to Supine   (seated in chair at end of session)   Additional Comments pt on RA during session; Spo2 WFL with no complaints of SOB   Transfers   Sit to Stand 5  Supervision   Additional items Increased time required;Verbal cues   Stand to Sit 5  Supervision   Additional items Increased time required;Verbal cues   Additional Comments pt performed functional transfers with (S) level; no significant LOB or instability; no device   Functional Mobility   Functional Mobility 5  Supervision   Additional Comments pt performs functional mobility with no device during session ~250ft; no significant LOB, however mild instability   Additional items   (no device)   Balance   Static Sitting Good   Dynamic Sitting Good   Static Standing Good   Dynamic Standing Fair +   Ambulatory Fair +   Activity Tolerance   Activity Tolerance Patient limited by fatigue   RUE Assessment   RUE Assessment WFL   LUE Assessment   LUE Assessment WFL   Hand Function   Gross Motor Coordination Functional   Fine Motor Coordination Functional   Sensation   Light Touch No apparent deficits   Sharp/Dull No apparent deficits   Psychosocial   Psychosocial (WDL) WDL   Cognition   Overall Cognitive Status WFL   Arousal/Participation Alert   Attention Within functional limits   Orientation Level Oriented X4   Memory Within functional limits   Following Commands Follows all commands and directions without difficulty   Assessment   Limitation Decreased ADL status;Decreased UE " strength;Decreased Safe judgement during ADL;Decreased endurance;Decreased self-care trans;Decreased high-level ADLs   Assessment Pt is a 83 y.o. female seen for OT evaluation s/p admit to [facility] on 1/18/2024 w/ Symptomatic anemia.  Comorbidities affecting pt's functional performance at time of assessment include:  arthritis, DM, HTN, hiatal hernia, CA, breast CA, CKD, H-pylori infection . Personal factors affecting pt at time of IE include:steps to enter environment, difficulty performing ADLS, difficulty performing IADLS , decreased initiation and engagement , and health management . Prior to admission, pt was (I) with ADLs and (A) with no device during mobility. Upon evaluation: Pt requires (S) level with use of no device during mobility 2* the following deficits impacting occupational performance: weakness, decreased strength, decreased balance, decreased tolerance, impaired initiation, and decreased safety awareness. Pt to benefit from continued skilled OT tx while in the hospital to address deficits as defined above and maximize level of functional independence w ADL's and functional mobility. Occupational Performance areas to address include: grooming, bathing/shower, toilet hygiene, dressing, functional mobility, community mobility, and clothing management. The patient's raw score on the -PAC Daily Activity Inpatient Short Form is 21. A raw score of less than 19 suggests the patient may benefit from discharge to home. Discharge recommendation at this time is level III minimum resource intensity.  Pt benefited from co-evaluation of skilled OT and PT therapists in order to most appropriately address functional deficits d/t extensive assistance required for safe functional mobility, decreased activity tolerance, and regression from functioning level prior to admission and/or onset of present illness. OT/PT objectives were addressed separately; please see PT note for specific goal areas targeted.   Goals    Patient Goals to go home   Short Term Goal  pt will perform UE strengthening exercises   Long Term Goal #1 pt will demonstrate toilet transfers and hygiene at (I) level   Long Term Goal #2 pt will demonstrate functional mobility with no device at (I) level   Long Term Goal pt will demonstrate UB/LB bathing and grooming tasks at (I) level   Plan   Treatment Interventions ADL retraining;Functional transfer training;UE strengthening/ROM;Endurance training;Patient/family training;Activityengagement   Goal Expiration Date 02/02/24   OT Frequency 3-5x/wk   Discharge Recommendation   Rehab Resource Intensity Level, OT III (Minimum Resource Intensity)   AM-PAC Daily Activity Inpatient   Lower Body Dressing 3   Bathing 3   Toileting 3   Upper Body Dressing 4   Grooming 4   Eating 4   Daily Activity Raw Score 21   Daily Activity Standardized Score (Calc for Raw Score >=11) 44.27   AM-PAC Applied Cognition Inpatient   Following a Speech/Presentation 4   Understanding Ordinary Conversation 4   Taking Medications 4   Remembering Where Things Are Placed or Put Away 4   Remembering List of 4-5 Errands 3   Taking Care of Complicated Tasks 3   Applied Cognition Raw Score 22   Applied Cognition Standardized Score 47.83

## 2024-01-19 NOTE — ASSESSMENT & PLAN NOTE
Patient presents with complaints of chest pain  Likely symptomatic anemia as her hemoglobin was found to be 7.5 down from baseline of approximately 9  Possibly iron deficiency anemia +/- vitamin B12/folate deficiency as MCV is 84  Status post 1 unit of packed red blood cells in the ED  No signs of bleeding whatsoever  Check iron panel/vitamin B12/folic acid  Trend CBC  Transfuse for hemoglobin less than 7.5 g/dL as patient is symptomatic  Outpatient follow-up with Hematology

## 2024-01-19 NOTE — PLAN OF CARE
Problem: OCCUPATIONAL THERAPY ADULT  Goal: Performs self-care activities at highest level of function for planned discharge setting.  See evaluation for individualized goals.  Description: Treatment Interventions: ADL retraining, Functional transfer training, UE strengthening/ROM, Endurance training, Patient/family training, Activityengagement          See flowsheet documentation for full assessment, interventions and recommendations.   Note: Limitation: Decreased ADL status, Decreased UE strength, Decreased Safe judgement during ADL, Decreased endurance, Decreased self-care trans, Decreased high-level ADLs     Assessment: Pt is a 83 y.o. female seen for OT evaluation s/p admit to [facility] on 1/18/2024 w/ Symptomatic anemia.  Comorbidities affecting pt's functional performance at time of assessment include:  arthritis, DM, HTN, hiatal hernia, CA, breast CA, CKD, H-pylori infection . Personal factors affecting pt at time of IE include:steps to enter environment, difficulty performing ADLS, difficulty performing IADLS , decreased initiation and engagement , and health management . Prior to admission, pt was (I) with ADLs and (A) with no device during mobility. Upon evaluation: Pt requires (S) level with use of no device during mobility 2* the following deficits impacting occupational performance: weakness, decreased strength, decreased balance, decreased tolerance, impaired initiation, and decreased safety awareness. Pt to benefit from continued skilled OT tx while in the hospital to address deficits as defined above and maximize level of functional independence w ADL's and functional mobility. Occupational Performance areas to address include: grooming, bathing/shower, toilet hygiene, dressing, functional mobility, community mobility, and clothing management. The patient's raw score on the -PAC Daily Activity Inpatient Short Form is 21. A raw score of less than 19 suggests the patient may benefit from discharge to  home. Discharge recommendation at this time is level III minimum resource intensity.  Pt benefited from co-evaluation of skilled OT and PT therapists in order to most appropriately address functional deficits d/t extensive assistance required for safe functional mobility, decreased activity tolerance, and regression from functioning level prior to admission and/or onset of present illness. OT/PT objectives were addressed separately; please see PT note for specific goal areas targeted.     Rehab Resource Intensity Level, OT: III (Minimum Resource Intensity)

## 2024-01-19 NOTE — H&P
[facility]  H&P  Name: Charlee Botello 83 y.o. female I MRN: [id number]  Unit/Bed#: RM10 I Date of Admission: 1/18/2024   Date of Service: 1/19/2024 I Hospital Day: 0      Assessment/Plan   * Symptomatic anemia  Assessment & Plan  Patient presents with complaints of chest pain  Likely symptomatic anemia as her hemoglobin was found to be 7.5 down from baseline of approximately 9  Possibly iron deficiency anemia +/- vitamin B12/folate deficiency as MCV is 84  Status post 1 unit of packed red blood cells in the ED  No signs of bleeding whatsoever  Check iron panel/vitamin B12/folic acid  Trend CBC  Transfuse for hemoglobin less than 7.5 g/dL as patient is symptomatic  Outpatient follow-up with Hematology    COVID-19  Assessment & Plan  COVID-19 positive on 1/18/2024  Hemodynamically stable and saturating well on room air at 100%  Supportive care  Monitor respiratory status  Consider treatment if patient begins to require oxygen  Contact and airborne precautions    Hiatal hernia with GERD without esophagitis  Assessment & Plan  EGD this past August remarkable for gastritis  Biopsy revealed no evidence of H. Pylori  Patient denies melenic stools, hematemesis, hematochezia  Continue pantoprazole 40 mg daily and sucralfate 1 g oral 2 times daily  Outpatient follow-up with gastroenterology  No evidence of active bleeding    Acquired hypothyroidism  Assessment & Plan  TSH elevated at 10.98  Increase home levothyroxine to 88 mcg daily  Recheck TSH in 4 to 6 weeks    Dyslipidemia  Assessment & Plan  Continue home statin therapy         VTE Prophylaxis: Contraindicated 2/2 anemia  Code Status: Level 1 full code    Anticipated Length of Stay:  Patient will be admitted on an Observation basis with an anticipated length of stay of less than 2 midnights.   Justification for Hospital Stay: Symptomatic anemia    Total Time for Visit, including Counseling / Coordination of Care: I have spent a total time of 45  minutes on 01/19/24 in caring for this patient including Diagnostic results, Prognosis, Risks and benefits of tx options, Instructions for management, Patient and family education, Importance of tx compliance, Risk factor reductions, Impressions, Counseling / Coordination of care, Documenting in the medical record, Reviewing / ordering tests, medicine, procedures  , Obtaining or reviewing history  , and Communicating with other healthcare professionals .    Chief Complaint:   Chest pain    History of Present Illness:    Charlee Botello is a 83 y.o. female with a past medical history significant for GERD with hiatal hernia, hyperlipidemia, hypothyroidism, anemia who presents with complaints of chest pain.  The patient underwent outpatient laboratory analysis with her primary care physician and was found to have a hemoglobin level of 7.9.  She presents to the ED for further evaluation.  Her only complaint is chest pain.  In the ED, all vital signs were found to be stable.  Hemodynamically stable and saturating well on room air.  The patient was found to be COVID-19 positive.  Hemoglobin 7.5.  The patient denies any melenic stools, hematochezia, hematemesis.  The patient was recommended to follow-up with hematology in the outpatient setting however has yet to do so.  She was ordered 1 unit of packed red blood cells in the ED and was assigned to observation in the setting of symptomatic anemia.    Review of Systems:    Review of Systems   Constitutional:  Negative for chills and fever.   HENT:  Negative for ear pain and sore throat.    Eyes:  Negative for pain and visual disturbance.   Respiratory:  Negative for cough and shortness of breath.    Cardiovascular:  Positive for chest pain. Negative for palpitations.   Gastrointestinal:  Negative for abdominal pain and vomiting.   Genitourinary:  Negative for dysuria and hematuria.   Musculoskeletal:  Negative for arthralgias and back pain.   Skin:  Negative for color change  "and rash.   Neurological:  Negative for seizures and syncope.   All other systems reviewed and are negative.      Past Medical and Surgical History:     Past Medical History:   Diagnosis Date    Acquired renal cyst of left kidney 8/9/2023    Arthritis     Breast cancer (HCC)     Cancer (HCC)     Diabetes mellitus (HCC)     Generalized osteoarthritis 8/9/2023    Hiatal hernia     Hiatal hernia with GERD without esophagitis 4/16/2021    History of Helicobacter pylori infection 8/9/2023    Hypertension     Stage 3b chronic kidney disease (HCC) 8/9/2023    Type 2 diabetes mellitus with stage 3b chronic kidney disease, without long-term current use of insulin (HCC) 6/30/2021       Past Surgical History:   Procedure Laterality Date    ABDOMINAL SURGERY      states in maybe 40s-50s; \"my intestines were wrapped around each other\"    APPENDECTOMY      CHOLECYSTECTOMY      TONSILLECTOMY         Meds/Allergies:    Prior to Admission medications    Medication Sig Start Date End Date Taking? Authorizing Provider   acetaminophen (TYLENOL) 500 mg tablet Take 500 mg by mouth every 6 (six) hours as needed for mild pain    Historical Provider, MD   atorvastatin (LIPITOR) 20 mg tablet Take 20 mg by mouth daily    Historical Provider, MD   Diclofenac Sodium (VOLTAREN) 1 % apply to affected area four times a day 5/8/23   Historical Provider, MD   EPINEPHrine (EPIPEN) 0.3 mg/0.3 mL SOAJ Inject 0.3 mL (0.3 mg total) into a muscle once for 1 dose 10/12/22 10/12/22  Noam García PA-C   ferrous sulfate 325 (65 Fe) mg tablet Take 1 tablet (325 mg total) by mouth daily with breakfast 8/11/23 9/10/23  Yolanda Zuniga PA-C   HYDROcodone-acetaminophen (NORCO) 5-325 mg per tablet Take 1 tablet by mouth 3 (three) times a day as needed for pain 10/20/22   Historical Provider, MD   lansoprazole (PREVACID) 30 mg capsule take 1 capsule by mouth twice a day (9 AM/ 9 PM) 6/13/23   Historical Provider, MD   levothyroxine 75 mcg tablet " "Take 75 mcg by mouth daily    Historical Provider, MD   ondansetron (ZOFRAN-ODT) 4 mg disintegrating tablet Take 4 mg by mouth every 6 (six) hours as needed for nausea or vomiting    Historical Provider, MD   pantoprazole (PROTONIX) 40 mg tablet Take 1 tablet (40 mg total) by mouth daily 8/11/23 9/10/23  Yolanda Zuniag PA-C   sucralfate (CARAFATE) 1 g/10 mL suspension Take 10 mL by mouth 2 (two) times a day 8/2/23   Historical Provider, MD   vitamin B-12 (VITAMIN B-12) 1,000 mcg tablet Take 1,000 mcg by mouth every morning 6/30/23   Historical Provider, MD       Allergies:   Allergies   Allergen Reactions    Penicillins Other (See Comments), Shortness Of Breath and Throat Swelling     \"dizzy, nauseous\"    Dye [Iodinated Contrast Media] Nausea Only    Propoxyphene Nausea Only    Latex Rash       Social History:     Marital Status:    Substance Use History:   Social History     Substance and Sexual Activity   Alcohol Use Never     Social History     Tobacco Use   Smoking Status Never   Smokeless Tobacco Never     Social History     Substance and Sexual Activity   Drug Use Never       Family History:    Pertinent family history reviewed    Physical Exam:     Vitals:   Blood Pressure: 135/71 (01/18/24 2333)  Pulse: 77 (01/18/24 2333)  Temperature: (!) 97.3 °F (36.3 °C) (01/18/24 2333)  Temp Source: Temporal (01/18/24 2303)  Respirations: 18 (01/18/24 2333)  Height: 5' (152.4 cm) (01/18/24 2113)  Weight - Scale: 59.4 kg (131 lb) (01/18/24 2113)  SpO2: 98 % (01/18/24 2333)    Physical Exam  Vitals and nursing note reviewed.   Constitutional:       General: She is not in acute distress.     Appearance: She is well-developed.   HENT:      Head: Normocephalic and atraumatic.   Eyes:      Conjunctiva/sclera: Conjunctivae normal.   Cardiovascular:      Rate and Rhythm: Normal rate and regular rhythm.      Heart sounds: No murmur heard.  Pulmonary:      Effort: Pulmonary effort is normal. No respiratory " distress.      Breath sounds: Normal breath sounds.   Abdominal:      Palpations: Abdomen is soft.      Tenderness: There is no abdominal tenderness.   Musculoskeletal:         General: No swelling.      Cervical back: Neck supple.   Skin:     General: Skin is warm and dry.      Capillary Refill: Capillary refill takes less than 2 seconds.   Neurological:      Mental Status: She is alert.   Psychiatric:         Mood and Affect: Mood normal.          Additional Data:     Lab Results: I have reviewed pertinent results     Results from last 7 days   Lab Units 01/18/24 2127   WBC Thousand/uL 4.61   HEMOGLOBIN g/dL 7.5*   HEMATOCRIT % 26.9*   PLATELETS Thousands/uL 337   NEUTROS PCT % 67   LYMPHS PCT % 25   MONOS PCT % 7   EOS PCT % 1     Results from last 7 days   Lab Units 01/18/24 2127   SODIUM mmol/L 139   POTASSIUM mmol/L 3.8   CHLORIDE mmol/L 107   CO2 mmol/L 23   BUN mg/dL 12   CREATININE mg/dL 1.06   ANION GAP mmol/L 9   CALCIUM mg/dL 8.9   ALBUMIN g/dL 3.9   TOTAL BILIRUBIN mg/dL 0.30   ALK PHOS U/L 44   ALT U/L 7   AST U/L 12*   GLUCOSE RANDOM mg/dL 150*     Results from last 7 days   Lab Units 01/18/24 2127   INR  1.07                   Imaging: I have reviewed pertinent imaging     PE Study with CT Abdomen and Pelvis with contrast   Final Result by Alex Doss DO (01/18 2358)      No pulmonary embolus.      Mild wall thickening of the urinary bladder. Correlate for cystitis.      Otherwise, no acute abnormality or suspicious mass         Workstation performed: [id number]         XR chest 1 view portable   ED Interpretation by Sj Hung DO (01/18 1954)   X-ray interpreted by myself.  No acute cardiopulmonary process seen by myself when compared to prior chest x-ray on file, Pending official radiology report           EKG, Pathology, and Other Studies Reviewed on Admission:   EKG: NSR    Allscripts / Epic Records Reviewed    ** Please Note: This note has been constructed using a voice  recognition system. **

## 2024-01-19 NOTE — UTILIZATION REVIEW
Initial Clinical Review    Admission: Date/Time/Statement:   Admission Orders (From admission, onward)       Ordered        01/19/24 0017  Place in Observation  Once                          Orders Placed This Encounter   Procedures    Place in Observation     Standing Status:   Standing     Number of Occurrences:   1     Order Specific Question:   Level of Care     Answer:   Med Surg [16]     ED Arrival Information       Expected   -    Arrival   1/18/2024 21:08    Acuity   Urgent              Means of arrival   Ambulance    Escorted by   [facility]  ([facility] )    Service   Hospitalist    Admission type   Emergency              Arrival complaint   chest pain             Chief Complaint   Patient presents with    Chest Pain     Patient started with left sided chest pain intermittently since ALANNA. Patient states that she called her PCP regarding this and they sent her to the ED       Initial Presentation: 83 y.o. female presents to ed from home via for evaluation and treatment of chest pain. PMHX: HTN, DM2,CKD.  Clinical assessment significant for positive covid test. TSH 10.980, D-DIMER 0.92, HB 7.5.  Imaging shows no acute finding. Initially treated with iv zofran x1. Admit to observation for symptomatic anemia and Covid.      Date: 1-19-23    Day 2: observation   Temp 97.3, tachypnea.  Transfuse 1U prbc. Obtain PT/OT evaluations. Trend CBC, electrolytes, BMP. Follow up urine cultures.     ED Triage Vitals   01/18/24 2113 01/18/24 2113 01/18/24 2113 01/18/24 2115 01/18/24 2113   99.1 °F (37.3 °C) 83 18 130/76 95 %      Temporal Monitor           No Pain          01/19/24 62.4 kg (137 lb 9.1 oz)     Additional Vital Signs:               ate/Time Temp Pulse Resp BP MAP (mmHg) SpO2 O2 Device   01/19/24 1241 -- -- -- -- -- -- None (Room air)   01/19/24 07:58:29 98.7 °F (37.1 °C) 77 20 134/58 83 96 % --   01/19/24 0105 97.3 °F (36.3 °C) Abnormal  78 20 150/78 -- -- None (Room air)   01/19/24 0045  -- 73 28   Abnormal  164/74 106 97 % None (Room air)   01/18/24 2333 97.3 °F (36.3 °C) Abnormal  77 18 135/71 -- 98 % None (Room air)   01/18/24 2330 -- 71 16 137/68 97 98 % None (Room air)   01/18/24 2318 98.3 °F (36.8 °C) 71 16 142/83 -- 100 % None (Room air)   01/18/24 2303 98.2 °F (36.8 °C) 72 15 140/71 -- 99 % None (Room air)   01/18/24 2300 -- 75 11   Abnormal  140/71 98 98 % None (Room air)   01/18/24 2247 98.3 °F (36.8 °C) 73 17 130/67 -- 98 % None (Room air)   01/18/24 2230 -- 74 16 -- -- 96 % --   01/18/24 2130 -- 82 12 138/69 95 98 % --   01/18/24 2118 -- -- -- -- -- -- None (Room air)   01/18/24 2115 -- -- -- 130/76 95 -- --   01/18/24 2113 99.1 °F (37.3 °C) 83 18 -- -- 95 % None (Room air)           Pertinent Labs/Diagnostic Test Results:     PE Study with CT Abdomen and Pelvis with contrast   Final (01/18 2358)      No pulmonary embolus.      Mild wall thickening of the urinary bladder. Correlate for cystitis.      Otherwise, no acute abnormality or suspicious mass      XR chest 1 view portable   ED I (01/18 2244)   X-ray interpreted by myself.  No acute cardiopulmonary process seen by myself when compared to prior chest x-ray on file, Pending official radiology report       Final 01/19 0946)      No acute cardiopulmonary disease.        Results from last 7 days   Lab Units 01/18/24 2127   SARS-COV-2  Positive*     Results from last 7 days   Lab Units 01/19/24  1512 01/18/24 2127   WBC Thousand/uL 4.88 4.61   HEMOGLOBIN g/dL 9.4* 7.5*   HEMATOCRIT % 33.4* 26.9*   PLATELETS Thousands/uL 316 337   NEUTROS ABS Thousands/µL 3.51 3.07         Results from last 7 days   Lab Units 01/18/24  2127   SODIUM mmol/L 139   POTASSIUM mmol/L 3.8   CHLORIDE mmol/L 107   CO2 mmol/L 23   ANION GAP mmol/L 9   BUN mg/dL 12   CREATININE mg/dL 1.06   EGFR ml/min/1.73sq m 48   CALCIUM mg/dL 8.9   MAGNESIUM mg/dL 2.0     Results from last 7 days   Lab Units 01/18/24 2127   AST U/L 12*   ALT U/L 7   ALK PHOS U/L 44   TOTAL  PROTEIN g/dL 6.1*   ALBUMIN g/dL 3.9   TOTAL BILIRUBIN mg/dL 0.30         Results from last 7 days   Lab Units 01/18/24 2127   GLUCOSE RANDOM mg/dL 150*       Results from last 7 days   Lab Units 01/19/24  0154 01/19/24  0019 01/18/24 2127   HS TNI 0HR ng/L  --   --  5   HS TNI 2HR ng/L  --  6  --    HSTNI D2 ng/L  --  1  --    HS TNI 4HR ng/L 8  --   --    HSTNI D4 ng/L 3  --   --      Results from last 7 days   Lab Units 01/18/24 2127   D-DIMER QUANTITATIVE ug/ml FEU 0.92*     Results from last 7 days   Lab Units 01/18/24 2127   PROTIME seconds 13.8   INR  1.07   PTT seconds 33     Results from last 7 days   Lab Units 01/18/24 2127   TSH 3RD GENERATON uIU/mL 10.980*       Results from last 7 days   Lab Units 01/18/24 2127   FERRITIN ng/mL 5*   IRON SATURATION % 6*   IRON ug/dL 21*   TIBC ug/dL 371         Results from last 7 days   Lab Units 01/19/24  0633   UNIT PRODUCT CODE  [id number]   UNIT NUMBER  [id number]   UNITABO  O   UNITRH  POS   CROSSMATCH  Compatible   UNIT DISPENSE STATUS  Presumed Trans   UNIT PRODUCT VOL ml 350         Results from last 7 days   Lab Units 01/19/24  0836   CLARITY UA  Clear   COLOR UA  Light Yellow   SPEC GRAV UA  1.010   PH UA  6.5   GLUCOSE UA mg/dl Negative   KETONES UA mg/dl Negative   BLOOD UA  Negative   PROTEIN UA mg/dl Negative   NITRITE UA  Negative   BILIRUBIN UA  Negative   UROBILINOGEN UA E.U./dl 0.2   LEUKOCYTES UA  Trace*   WBC UA /hpf 4-10*   RBC UA /hpf 1-2   BACTERIA UA /hpf Occasional   EPITHELIAL CELLS WET PREP /hpf Occasional     Results from last 7 days   Lab Units 01/18/24 2127   INFLUENZA A PCR  Negative   INFLUENZA B PCR  Negative   RSV PCR  Negative         ED Treatment:   Medication Administration from 01/18/2024 2107 to 01/19/2024 0127         Date/Time Order Dose Route Action     01/18/2024 2154 EST ondansetron (ZOFRAN) injection 4 mg 4 mg Intravenous Given          Past Medical History:   Diagnosis Date    Acquired renal cyst of left kidney  08/09/2023    Arthritis     Breast cancer (HCC)     Cancer (HCC)     Diabetes mellitus (HCC)     Disease of thyroid gland     Generalized osteoarthritis 08/09/2023    Hiatal hernia     Hiatal hernia with GERD without esophagitis 04/16/2021    History of Helicobacter pylori infection 08/09/2023    Hypertension     Stage 3b chronic kidney disease (HCC) 08/09/2023    Type 2 diabetes mellitus with stage 3b chronic kidney disease, without long-term current use of insulin (HCC) 06/30/2021     Present on Admission:   Symptomatic anemia   Acquired hypothyroidism   Dyslipidemia   Hiatal hernia with GERD without esophagitis   COVID-19      Admitting Diagnosis:     Chest pain [R07.9]  Positive D dimer [R79.89]  Elevated TSH [R79.89]  Symptomatic anemia [D64.9]  Stage 3a chronic kidney disease (HCC) [N18.31]  Chest pain not due to acute coronary syndrome [R07.9]  COVID [U07.1]    Age/Sex: 83 y.o. female    Scheduled Medications:    atorvastatin, 20 mg, Oral, Daily With Dinner  vitamin B-12, 1,000 mcg, Oral, QAM  iron sucrose, 200 mg, Intravenous, Daily  levothyroxine, 88 mcg, Oral, Early Morning  melatonin, 3 mg, Oral, HS  pantoprazole, 40 mg, Oral, Daily  sucralfate, 1 g, Oral, BID      Continuous IV Infusions:     PRN Meds:  acetaminophen, 650 mg, Oral, Q4H PRN  Hydrocod Sunil-Chlorphe Sunil ER, 5 mL, Oral, Q12H PRN  HYDROcodone-acetaminophen, 1 tablet, Oral, TID PRN  ondansetron, 4 mg, Intravenous, Q6H PRN  oxymetazoline, 2 spray, Each Nare, Q12H PRN        IP CONSULT TO CASE MANAGEMENT    Network Utilization Review Department  ATTENTION: Please call with any questions or concerns to [phone] and carefully listen to the prompts so that you are directed to the right person. All voicemails are confidential.   For Discharge needs, contact Care Management DC Support Team at [phone] opt. [phone]  Send all requests for admission clinical reviews, approved or denied determinations and any other requests to dedicated fax number  below belonging to the campus where the patient is receiving treatment. List of dedicated fax numbers for the Facilities:  FACILITY NAME UR FAX NUMBER   ADMISSION DENIALS (Administrative/Medical Necessity) [phone]   DISCHARGE SUPPORT TEAM (NETWORK) [phone]   PARENT CHILD HEALTH (Maternity/NICU/Pediatrics) [phone]   [facility] [phone]   [facility] [phone]   formerly [facility] [phone]   [facility] [phone]   [facility] [phone]   [facility] [phone]   [facility] [phone]   [facility] [phone]   [facility] [phone]   [facility] [phone]   [facility] [phone]

## 2024-01-19 NOTE — ED PROVIDER NOTES
History  Chief Complaint   Patient presents with    Chest Pain     Patient started with left sided chest pain intermittently since ALANNA. Patient states that she called her PCP regarding this and they sent her to the ED       Chest Pain  Associated symptoms: shortness of breath    Associated symptoms: no abdominal pain, no back pain, no cough, no fever, no nausea, no palpitations and not vomiting      This is an 83-year-old female she has a past medical history significant for hiatal hernia, GERD, HTN, osteoarthritis, T2DM, CKD, history of abdominal surgery in the remote past, presented to the emergency department from home via EMS for evaluation of chest pain.    Patient reports that she recently had outpatient labs with her PCP.  She reports that she received a call today indicating that she had a low hemoglobin at 7.9 and at that time she verbalized that she had been experiencing a 20-hour history of persistent mid substernal chest discomfort.  She reports the symptoms began around midnight last night.  She reports the pain has persisted until shortly prior to arrival and is now gradually easing up and she reports she is pain-free at this time.  She describes her pain as a dull discomfort which worsens with activity particularly walking, stairs.  She reports when she does these activities she feels short of breath and that her heart is pounding/racing in her chest and worsens her discomfort.  She reports that the exertional symptoms without the chest pain had been present about 2-1/2 weeks.  The chest discomfort only began since midnight.  She received aspirin via EMS.  Prehospital EKG was unremarkable.  She feels fine at this time.  She reports she would not of come here if it was not for her PCP advising her to be evaluated with concerns for symptomatic anemia.  She reports a history of blood transfusions upon review of chart she had a history of a transfusion in August during admission here.  She underwent  evaluation by GI including scope and there was evidence of gastritis and H. pylori.  Her anemia was attributed to acute on chronic anemia likely secondary to gastritis versus chronic disease.  She reports a history of iron supplementation in the past for iron deficiency anemia.  She denies any current history of melanotic stools hematemesis hematochezia.  She does report a mild fall while she was traversing the stairs on her bottom due to difficulty with ambulation due to chronic arthritis and these more recent symptoms of exertional dyspnea/palpitations.  She denies head strike or LOC associate with a fall 1 week ago denies any headache confusion seizures vision changes since that time.  She denies any blood thinner use.  She reports some mild chronic leg swelling which has not worsened and is equal and she denies use of diuretic.  Prior to Admission Medications   Prescriptions Last Dose Informant Patient Reported? Taking?   Diclofenac Sodium (VOLTAREN) 1 %   Yes No   Sig: apply to affected area four times a day   EPINEPHrine (EPIPEN) 0.3 mg/0.3 mL SOAJ   No No   Sig: Inject 0.3 mL (0.3 mg total) into a muscle once for 1 dose   HYDROcodone-acetaminophen (NORCO) 5-325 mg per tablet   Yes No   Sig: Take 1 tablet by mouth 3 (three) times a day as needed for pain   acetaminophen (TYLENOL) 500 mg tablet   Yes No   Sig: Take 500 mg by mouth every 6 (six) hours as needed for mild pain   atorvastatin (LIPITOR) 20 mg tablet   Yes No   Sig: Take 20 mg by mouth daily   ferrous sulfate 325 (65 Fe) mg tablet   No No   Sig: Take 1 tablet (325 mg total) by mouth daily with breakfast   lansoprazole (PREVACID) 30 mg capsule   Yes No   Sig: take 1 capsule by mouth twice a day (9 AM/ 9 PM)   levothyroxine 75 mcg tablet   Yes No   Sig: Take 75 mcg by mouth daily   ondansetron (ZOFRAN-ODT) 4 mg disintegrating tablet   Yes No   Sig: Take 4 mg by mouth every 6 (six) hours as needed for nausea or vomiting   pantoprazole (PROTONIX) 40 mg  "tablet   No No   Sig: Take 1 tablet (40 mg total) by mouth daily   sucralfate (CARAFATE) 1 g/10 mL suspension   Yes No   Sig: Take 10 mL by mouth 2 (two) times a day   vitamin B-12 (VITAMIN B-12) 1,000 mcg tablet   Yes No   Sig: Take 1,000 mcg by mouth every morning      Facility-Administered Medications: None       Past Medical History:   Diagnosis Date    Acquired renal cyst of left kidney 8/9/2023    Arthritis     Breast cancer (HCC)     Cancer (HCC)     Diabetes mellitus (HCC)     Generalized osteoarthritis 8/9/2023    Hiatal hernia     Hiatal hernia with GERD without esophagitis 4/16/2021    History of Helicobacter pylori infection 8/9/2023    Hypertension     Stage 3b chronic kidney disease (HCC) 8/9/2023    Type 2 diabetes mellitus with stage 3b chronic kidney disease, without long-term current use of insulin (HCC) 6/30/2021       Past Surgical History:   Procedure Laterality Date    ABDOMINAL SURGERY      states in maybe 40s-50s; \"my intestines were wrapped around each other\"    APPENDECTOMY      CHOLECYSTECTOMY      TONSILLECTOMY         History reviewed. No pertinent family history.  I have reviewed and agree with the history as documented.    E-Cigarette/Vaping    E-Cigarette Use Never User      E-Cigarette/Vaping Substances    Nicotine No     THC No     CBD No      Social History     Tobacco Use    Smoking status: Never    Smokeless tobacco: Never   Vaping Use    Vaping status: Never Used   Substance Use Topics    Alcohol use: Never    Drug use: Never       Review of Systems   Constitutional:  Positive for activity change. Negative for chills and fever.   HENT:  Negative for ear pain and sore throat.    Eyes:  Negative for pain and visual disturbance.   Respiratory:  Positive for chest tightness and shortness of breath. Negative for cough.    Cardiovascular:  Positive for chest pain and leg swelling. Negative for palpitations.   Gastrointestinal:  Negative for abdominal pain, blood in stool, diarrhea, " nausea and vomiting.   Genitourinary:  Negative for dysuria and hematuria.   Musculoskeletal:  Negative for arthralgias and back pain.   Skin:  Negative for color change and rash.   Neurological:  Negative for seizures and syncope.   All other systems reviewed and are negative.      Physical Exam  Physical Exam  Vitals and nursing note reviewed.   Constitutional:       General: She is not in acute distress.     Appearance: She is well-developed. She is not ill-appearing, toxic-appearing or diaphoretic.   HENT:      Head: Normocephalic and atraumatic.   Eyes:      Extraocular Movements: Extraocular movements intact.      Pupils: Pupils are equal, round, and reactive to light.   Neck:      Vascular: No JVD.   Cardiovascular:      Rate and Rhythm: Normal rate and regular rhythm.      Heart sounds: Normal heart sounds. No murmur heard.     No friction rub.   Pulmonary:      Effort: Pulmonary effort is normal. No tachypnea, accessory muscle usage or respiratory distress.      Breath sounds: Normal breath sounds. No stridor. No decreased breath sounds, wheezing, rhonchi or rales.   Chest:      Chest wall: No tenderness.   Abdominal:      Tenderness: There is no abdominal tenderness.   Musculoskeletal:      Right lower leg: No edema.      Left lower leg: No edema.   Skin:     General: Skin is warm and dry.      Capillary Refill: Capillary refill takes less than 2 seconds.   Neurological:      General: No focal deficit present.      Mental Status: She is alert. She is disoriented.         Vital Signs  ED Triage Vitals   Temperature Pulse Respirations Blood Pressure SpO2   01/18/24 2113 01/18/24 2113 01/18/24 2113 01/18/24 2115 01/18/24 2113   99.1 °F (37.3 °C) 83 18 130/76 95 %      Temp Source Heart Rate Source Patient Position - Orthostatic VS BP Location FiO2 (%)   01/18/24 2247 01/18/24 2113 01/18/24 2113 01/18/24 2113 --   Temporal Monitor Lying Right arm       Pain Score       01/18/24 2330       No Pain            Vitals:    01/18/24 2303 01/18/24 2318 01/18/24 2330 01/18/24 2333   BP: 140/71 142/83 137/68 135/71   Pulse: 72 71 71 77   Patient Position - Orthostatic VS:   Lying          Visual Acuity      ED Medications  Medications   ondansetron (ZOFRAN) injection 4 mg (4 mg Intravenous Given 1/18/24 2154)   iohexol (OMNIPAQUE) 350 MG/ML injection (MULTI-DOSE) 100 mL (100 mL Intravenous Given 1/18/24 2211)       Diagnostic Studies  Results Reviewed       Procedure Component Value Units Date/Time    HS Troponin I 2hr [[id number]] Collected: 01/19/24 0019    Lab Status: No result Specimen: Blood from Arm, Right     UA w Reflex to Microscopic w Reflex to Culture [[id number]]     Lab Status: No result Specimen: Urine     HS Troponin I 4hr [[id number]]     Lab Status: No result Specimen: Blood     FLU/RSV/COVID - if FLU/RSV clinically relevant [[id number]]  (Abnormal) Collected: 01/18/24 2127    Lab Status: Final result Specimen: Nares from Nose Updated: 01/18/24 2212     SARS-CoV-2 Positive     INFLUENZA A PCR Negative     INFLUENZA B PCR Negative     RSV PCR Negative    Narrative:      FOR PEDIATRIC PATIENTS - copy/paste COVID Guidelines URL to browser: [email or web address]    SARS-CoV-2 assay is a Nucleic Acid Amplification assay intended for the  qualitative detection of nucleic acid from SARS-CoV-2 in nasopharyngeal  swabs. Results are for the presumptive identification of SARS-CoV-2 RNA.    Positive results are indicative of infection with SARS-CoV-2, the virus  causing COVID-19, but do not rule out bacterial infection or co-infection  with other viruses. Laboratories within the [place] and its  territories are required to report all positive results to the appropriate  public health authorities. Negative results do not preclude SARS-CoV-2  infection and should not be used as the sole basis for treatment or other  patient management decisions. Negative results  must be combined with  clinical observations, patient history, and epidemiological information.  This test has not been FDA cleared or approved.    This test has been authorized by FDA under an Emergency Use Authorization  (EUA). This test is only authorized for the duration of time the  declaration that circumstances exist justifying the authorization of the  emergency use of an in vitro diagnostic tests for detection of SARS-CoV-2  virus and/or diagnosis of COVID-19 infection under section 564(b)(1) of  the Act, 21 U.S.C. 360bbb-3(b)(1), unless the authorization is terminated  or revoked sooner. The test has been validated but independent review by FDA  and CLIA is pending.    Test performed using Where Was it Filmed GeneXpert: This RT-PCR assay targets N2,  a region unique to SARS-CoV-2. A conserved region in the E-gene was chosen  for pan-Sarbecovirus detection which includes SARS-CoV-2.    According to CMS-2020-01-R, this platform meets the definition of high-throughput technology.    TSH, 3rd generation with Free T4 reflex [[id number]]  (Abnormal) Collected: 01/18/24 2127    Lab Status: Final result Specimen: Blood from Arm, Right Updated: 01/18/24 2206     TSH 3RD GENERATON 10.980 uIU/mL     T4, free [[id number]] Collected: 01/18/24 2127    Lab Status: In process Specimen: Blood from Arm, Right Updated: 01/18/24 2206    HS Troponin 0hr (reflex protocol) [[id number]]  (Normal) Collected: 01/18/24 2127    Lab Status: Final result Specimen: Blood from Arm, Right Updated: 01/18/24 2158     hs TnI 0hr 5 ng/L     Comprehensive metabolic panel [[id number]]  (Abnormal) Collected: 01/18/24 2127    Lab Status: Final result Specimen: Blood from Arm, Right Updated: 01/18/24 2152     Sodium 139 mmol/L      Potassium 3.8 mmol/L      Chloride 107 mmol/L      CO2 23 mmol/L      ANION GAP 9 mmol/L      BUN 12 mg/dL      Creatinine 1.06 mg/dL      Glucose 150 mg/dL      Calcium 8.9 mg/dL      AST 12 U/L      ALT 7 U/L      Alkaline  Phosphatase 44 U/L      Total Protein 6.1 g/dL      Albumin 3.9 g/dL      Total Bilirubin 0.30 mg/dL      eGFR 48 ml/min/1.73sq m     Narrative:      National Kidney Disease Foundation guidelines for Chronic Kidney Disease (CKD):     Stage 1 with normal or high GFR (GFR > 90 mL/min/1.73 square meters)    Stage 2 Mild CKD (GFR = 60-89 mL/min/1.73 square meters)    Stage 3A Moderate CKD (GFR = 45-59 mL/min/1.73 square meters)    Stage 3B Moderate CKD (GFR = 30-44 mL/min/1.73 square meters)    Stage 4 Severe CKD (GFR = 15-29 mL/min/1.73 square meters)    Stage 5 End Stage CKD (GFR <15 mL/min/1.73 square meters)  Note: GFR calculation is accurate only with a steady state creatinine    Magnesium [[id number]]  (Normal) Collected: 01/18/24 2127    Lab Status: Final result Specimen: Blood from Arm, Right Updated: 01/18/24 2152     Magnesium 2.0 mg/dL     D-Dimer [[id number]]  (Abnormal) Collected: 01/18/24 2127    Lab Status: Final result Specimen: Blood from Arm, Right Updated: 01/18/24 2146     D-Dimer, Quant 0.92 ug/ml FEU     Narrative:      In the evaluation for possible pulmonary embolism, in the appropriate (Well's Score of 4 or less) patient, the age adjusted d-dimer cutoff for this patient can be calculated as:    Age x 0.01 (in ug/mL) for Age-adjusted D-dimer exclusion threshold for a patient over 50 years.    Protime-INR [[id number]]  (Normal) Collected: 01/18/24 2127    Lab Status: Final result Specimen: Blood from Arm, Right Updated: 01/18/24 2144     Protime 13.8 seconds      INR 1.07    APTT [[id number]]  (Normal) Collected: 01/18/24 2127    Lab Status: Final result Specimen: Blood from Arm, Right Updated: 01/18/24 2144     PTT 33 seconds     CBC and differential [[id number]]  (Abnormal) Collected: 01/18/24 2127    Lab Status: Final result Specimen: Blood from Arm, Right Updated: 01/18/24 2134     WBC 4.61 Thousand/uL      RBC 3.22 Million/uL      Hemoglobin 7.5 g/dL      Hematocrit 26.9 %      MCV 84 fL      MCH  23.3 pg      MCHC 27.9 g/dL      RDW 16.2 %      MPV 9.1 fL      Platelets 337 Thousands/uL      nRBC 0 /100 WBCs      Neutrophils Relative 67 %      Immat GRANS % 0 %      Lymphocytes Relative 25 %      Monocytes Relative 7 %      Eosinophils Relative 1 %      Basophils Relative 0 %      Neutrophils Absolute 3.07 Thousands/µL      Immature Grans Absolute 0.01 Thousand/uL      Lymphocytes Absolute 1.17 Thousands/µL      Monocytes Absolute 0.31 Thousand/µL      Eosinophils Absolute 0.03 Thousand/µL      Basophils Absolute 0.02 Thousands/µL     Ferritin [[id number]] Collected: 01/18/24 2127    Lab Status: In process Specimen: Blood from Arm, Right Updated: 01/18/24 2131    TIBC Panel (incl. Iron, TIBC, % Iron Saturation) [[id number]] Collected: 01/18/24 2127    Lab Status: In process Specimen: Blood from Arm, Right Updated: 01/18/24 2130                   PE Study with CT Abdomen and Pelvis with contrast   Final Result by Alex Doss DO (01/18 2358)      No pulmonary embolus.      Mild wall thickening of the urinary bladder. Correlate for cystitis.      Otherwise, no acute abnormality or suspicious mass         Workstation performed: [id number]         XR chest 1 view portable   ED Interpretation by Sj Hung DO (01/18 2244)   X-ray interpreted by myself.  No acute cardiopulmonary process seen by myself when compared to prior chest x-ray on file, Pending official radiology report                  Procedures  Procedures         ED Course  ED Course as of 01/19/24 0021   u Jan 18, 2024 2124 EKG interpreted by myself.  EKG dated 1/18/2024 2115 demonstrates sinus rhythm with PACs at 81 bpm, normal FL, QRS, QTc durations, no STEMI.   2137 Hemoglobin(!): 7.5   2138 Patient signed written consent for blood in my presence at the bedside and I signed as well.  Risk and benefits were explained patient is agreeable to proceed with transfusion.   2149 D-Dimer, Quant(!): 0.92  Will proceed with CTA chest PE study.    2224 SARS-COV-2(!): Positive   Fri Jan 19, 2024   0013 Patient's PCP called me by phone and reported her medical history and concerns for having symptomatic anemia I updated him on the results of the workup he was concerned about her going home in light of her current symptoms and with impending snowstorm and is requesting that we admit her for management of the symptomatic anemia I discussed the case with Derek for admission the patient is agreeable.                               SBIRT 22yo+      Flowsheet Row Most Recent Value   Initial Alcohol Screen: US AUDIT-C     1. How often do you have a drink containing alcohol? 0 Filed at: 01/18/2024 2119   2. How many drinks containing alcohol do you have on a typical day you are drinking?  0 Filed at: 01/18/2024 2119   3a. Male UNDER 65: How often do you have five or more drinks on one occasion? 0 Filed at: 01/18/2024 2119   3b. FEMALE Any Age, or MALE 65+: How often do you have 4 or more drinks on one occassion? 0 Filed at: 01/18/2024 2119   Audit-C Score 0 Filed at: 01/18/2024 2119   JOSE: How many times in the past year have you...    Used an illegal drug or used a prescription medication for non-medical reasons? Never Filed at: 01/18/2024 2119                      Medical Decision Making  This is an 83-year-old female who presents to the emergency department for evaluation of a low hemoglobin on outpatient labs in the setting of acute on chronic anemia attributed at times to iron deficiency and recently to chronic gastritis/hiatal hernia without any signs or symptoms of acute GI bleed or traumatic injury and no chronic anticoagulation.  Patient has required blood transfusions in the past.  She is here specifically because when her PCP called about the outpatient labs this evening she endorsed a 2-week history of dyspnea on exertion and a 12-hour history of chest pain which prompted EMS to bring the patient here.  On arrival here she has no chest pain her troponin  is negative her EKG reveals no acute ischemic changes.  Her hemoglobin here is 7.5 and after reviewing her symptoms of the last few weeks I think she is experiencing symptomatic anemia and will initiate blood transfusion in the ER to bring her hemoglobin above transfusion threshold.  She is consented for this.  During workup she was also found to have COVID-positive status however her lungs are clear and she has normal oxygenation and no respiratory distress or fever.  It is unclear what contribution the COVID is having on her current symptoms as opposed to the anemia.  Workup otherwise revealed stable/chronic findings.  Patient's D-dimer was elevated and given her dyspnea on exertion there was concern for pulmonary embolism however CTA PE was negative for PE.  The patient's PCP also called and was concerned about her going home due to her symptoms level hemoglobin and COVID status in light of the fact that she lives alone at home and that there is an impending storm.  With this in mind given her current symptoms and hemoglobin I discussed case with Derek for observation in the hospital with plan to reassess labs and symptoms in the morning if improving would attribute her symptoms due to the anemia primarily and think that she can be managed as an outpatient at that point.    Problems Addressed:  Chest pain not due to acute coronary syndrome: acute illness or injury  COVID: acute illness or injury  Elevated TSH: chronic illness or injury  Stage 3a chronic kidney disease (HCC): chronic illness or injury  Symptomatic anemia: acute illness or injury with systemic symptoms that poses a threat to life or bodily functions    Amount and/or Complexity of Data Reviewed  Labs: ordered. Decision-making details documented in ED Course.  Radiology: ordered and independent interpretation performed.  ECG/medicine tests: ordered and independent interpretation performed. Decision-making details documented in ED  Course.    Risk  Prescription drug management.  Decision regarding hospitalization.             Disposition  Final diagnoses:   Symptomatic anemia   Chest pain not due to acute coronary syndrome   Positive D dimer   COVID   Elevated TSH   Stage 3a chronic kidney disease (HCC)     Time reflects when diagnosis was documented in both MDM as applicable and the Disposition within this note       Time User Action Codes Description Comment    1/18/2024 10:37 PM Sj Hung [D64.9] Symptomatic anemia     1/18/2024 10:37 PM Sj Hung [R07.9] Chest pain not due to acute coronary syndrome     1/18/2024 10:37 PM Sj Hung [R79.89] Positive D dimer     1/18/2024 10:37 PM Sj Hung [U07.1] COVID     1/18/2024 10:38 PM Sj Hung [R79.89] Elevated TSH     1/18/2024 10:38 PM Sj Hung [N18.31] Stage 3a chronic kidney disease (HCC)           ED Disposition       ED Disposition   Admit    Condition   Stable    Date/Time   Fri Jan 19, 2024 0017    Comment   Case was discussed with POPEYE and the patient's admission status was agreed to be Admission Status: observation status to the service of Dr. Oneal .               Follow-up Information    None         Patient's Medications   Discharge Prescriptions    No medications on file       No discharge procedures on file.    PDMP Review       None            ED Provider  Electronically Signed by             Sj Hung DO  01/19/24 0021

## 2024-01-19 NOTE — ASSESSMENT & PLAN NOTE
Patient presents with complaints of chest pain  Likely symptomatic anemia as her hemoglobin was found to be 7.5 down from baseline of approximately 9  Possibly iron deficiency anemia +/- vitamin B12/folate deficiency as MCV is 84  Status post 1 unit of packed red blood cells in the ED  No signs of bleeding whatsoever  Check iron panel/vitamin B12/folic acid  Initiated on IV venofer  Trend CBC  Transfuse for hemoglobin less than 7.5 g/dL as patient is symptomatic  Outpatient follow-up with Hematology

## 2024-01-19 NOTE — ASSESSMENT & PLAN NOTE
EGD this past August remarkable for gastritis  Biopsy revealed no evidence of H. Pylori  Patient denies melenic stools, hematemesis, hematochezia  Continue pantoprazole 40 mg daily and sucralfate 1 g oral 2 times daily  Outpatient follow-up with gastroenterology  No evidence of active bleeding

## 2024-01-19 NOTE — PROGRESS NOTES
[facility]  Progress Note  Name: Charlee Botello I  MRN: [id number]  Unit/Bed#: 415-01 I Date of Admission: 1/18/2024   Date of Service: 1/19/2024 I Hospital Day: 0    Assessment/Plan   COVID-19  Assessment & Plan  COVID-19 positive on 1/18/2024  Hemodynamically stable and saturating well on room air at 100%  Supportive care  Monitor respiratory status  Consider treatment if patient begins to require oxygen  Contact and airborne precautions    Hiatal hernia with GERD without esophagitis  Assessment & Plan  EGD this past August remarkable for gastritis  Biopsy revealed no evidence of H. Pylori  Patient denies melenic stools, hematemesis, hematochezia  Continue pantoprazole 40 mg daily and sucralfate 1 g oral 2 times daily  Outpatient follow-up with gastroenterology  No evidence of active bleeding    Acquired hypothyroidism  Assessment & Plan  TSH elevated at 10.98  Pend T4    Dyslipidemia  Assessment & Plan  Continue home statin therapy    * Symptomatic anemia  Assessment & Plan  Patient presents with complaints of chest pain  Likely symptomatic anemia as her hemoglobin was found to be 7.5 down from baseline of approximately 9  Possibly iron deficiency anemia +/- vitamin B12/folate deficiency as MCV is 84  Status post 1 unit of packed red blood cells in the ED  No signs of bleeding whatsoever  Check iron panel/vitamin B12/folic acid  Initiated on IV venofer  Trend CBC  Transfuse for hemoglobin less than 7.5 g/dL as patient is symptomatic  Outpatient follow-up with Hematology             Patient declined for me to call family

## 2024-01-19 NOTE — ASSESSMENT & PLAN NOTE
COVID-19 positive on 1/18/2024  Hemodynamically stable and saturating well on room air at 100%  Supportive care  Monitor respiratory status  Consider treatment if patient begins to require oxygen  Contact and airborne precautions

## 2024-01-20 VITALS
HEART RATE: 71 BPM | OXYGEN SATURATION: 96 % | BODY MASS INDEX: 25.32 KG/M2 | DIASTOLIC BLOOD PRESSURE: 61 MMHG | HEIGHT: 62 IN | RESPIRATION RATE: 20 BRPM | TEMPERATURE: 98.3 F | WEIGHT: 137.57 LBS | SYSTOLIC BLOOD PRESSURE: 142 MMHG

## 2024-01-20 PROBLEM — D50.9 IDA (IRON DEFICIENCY ANEMIA): Status: ACTIVE | Noted: 2024-01-20

## 2024-01-20 LAB
ANION GAP SERPL CALCULATED.3IONS-SCNC: 6 MMOL/L
BASOPHILS # BLD AUTO: 0.02 THOUSANDS/ÂΜL (ref 0–0.1)
BASOPHILS NFR BLD AUTO: 1 % (ref 0–1)
BUN SERPL-MCNC: 10 MG/DL (ref 5–25)
CALCIUM SERPL-MCNC: 9.2 MG/DL (ref 8.4–10.2)
CHLORIDE SERPL-SCNC: 106 MMOL/L (ref 96–108)
CO2 SERPL-SCNC: 28 MMOL/L (ref 21–32)
CREAT SERPL-MCNC: 1.02 MG/DL (ref 0.6–1.3)
EOSINOPHIL # BLD AUTO: 0.07 THOUSAND/ÂΜL (ref 0–0.61)
EOSINOPHIL NFR BLD AUTO: 2 % (ref 0–6)
ERYTHROCYTE [DISTWIDTH] IN BLOOD BY AUTOMATED COUNT: 16.5 % (ref 11.6–15.1)
FERRITIN SERPL-MCNC: 130 NG/ML (ref 11–307)
GFR SERPL CREATININE-BSD FRML MDRD: 50 ML/MIN/1.73SQ M
GLUCOSE P FAST SERPL-MCNC: 94 MG/DL (ref 65–99)
GLUCOSE SERPL-MCNC: 94 MG/DL (ref 65–140)
HCT VFR BLD AUTO: 31.9 % (ref 34.8–46.1)
HGB BLD-MCNC: 9 G/DL (ref 11.5–15.4)
IMM GRANULOCYTES # BLD AUTO: 0.01 THOUSAND/UL (ref 0–0.2)
IMM GRANULOCYTES NFR BLD AUTO: 0 % (ref 0–2)
IRON SERPL-MCNC: 312 UG/DL (ref 50–212)
LYMPHOCYTES # BLD AUTO: 1.35 THOUSANDS/ÂΜL (ref 0.6–4.47)
LYMPHOCYTES NFR BLD AUTO: 33 % (ref 14–44)
MAGNESIUM SERPL-MCNC: 2.1 MG/DL (ref 1.9–2.7)
MCH RBC QN AUTO: 23.8 PG (ref 26.8–34.3)
MCHC RBC AUTO-ENTMCNC: 28.2 G/DL (ref 31.4–37.4)
MCV RBC AUTO: 84 FL (ref 82–98)
MONOCYTES # BLD AUTO: 0.37 THOUSAND/ÂΜL (ref 0.17–1.22)
MONOCYTES NFR BLD AUTO: 9 % (ref 4–12)
NEUTROPHILS # BLD AUTO: 2.25 THOUSANDS/ÂΜL (ref 1.85–7.62)
NEUTS SEG NFR BLD AUTO: 55 % (ref 43–75)
NRBC BLD AUTO-RTO: 0 /100 WBCS
PHOSPHATE SERPL-MCNC: 3.3 MG/DL (ref 2.3–4.1)
PLATELET # BLD AUTO: 302 THOUSANDS/UL (ref 149–390)
PMV BLD AUTO: 8.7 FL (ref 8.9–12.7)
POTASSIUM SERPL-SCNC: 4 MMOL/L (ref 3.5–5.3)
RBC # BLD AUTO: 3.78 MILLION/UL (ref 3.81–5.12)
SODIUM SERPL-SCNC: 140 MMOL/L (ref 135–147)
TIBC SERPL-MCNC: <367 UG/DL (ref 250–450)
UIBC SERPL-MCNC: <55 UG/DL (ref 155–355)
WBC # BLD AUTO: 4.07 THOUSAND/UL (ref 4.31–10.16)

## 2024-01-20 PROCEDURE — 83550 IRON BINDING TEST: CPT | Performed by: INTERNAL MEDICINE

## 2024-01-20 PROCEDURE — 82728 ASSAY OF FERRITIN: CPT | Performed by: INTERNAL MEDICINE

## 2024-01-20 PROCEDURE — 83540 ASSAY OF IRON: CPT | Performed by: INTERNAL MEDICINE

## 2024-01-20 PROCEDURE — 99239 HOSP IP/OBS DSCHRG MGMT >30: CPT

## 2024-01-20 PROCEDURE — 85025 COMPLETE CBC W/AUTO DIFF WBC: CPT | Performed by: INTERNAL MEDICINE

## 2024-01-20 PROCEDURE — 83735 ASSAY OF MAGNESIUM: CPT | Performed by: INTERNAL MEDICINE

## 2024-01-20 PROCEDURE — 80048 BASIC METABOLIC PNL TOTAL CA: CPT | Performed by: INTERNAL MEDICINE

## 2024-01-20 PROCEDURE — 84100 ASSAY OF PHOSPHORUS: CPT | Performed by: INTERNAL MEDICINE

## 2024-01-20 RX ORDER — FERROUS SULFATE 325(65) MG
325 TABLET ORAL
Qty: 30 TABLET | Refills: 0 | Status: SHIPPED | OUTPATIENT
Start: 2024-01-20 | End: 2024-02-19

## 2024-01-20 RX ORDER — LEVOTHYROXINE SODIUM 88 UG/1
88 TABLET ORAL
Qty: 30 TABLET | Refills: 0 | Status: SHIPPED | OUTPATIENT
Start: 2024-01-21 | End: 2024-02-20

## 2024-01-20 RX ADMIN — IRON SUCROSE 200 MG: 20 INJECTION, SOLUTION INTRAVENOUS at 09:15

## 2024-01-20 RX ADMIN — CYANOCOBALAMIN TAB 1000 MCG 1000 MCG: 1000 TAB at 09:15

## 2024-01-20 RX ADMIN — PANTOPRAZOLE SODIUM 40 MG: 40 TABLET, DELAYED RELEASE ORAL at 09:15

## 2024-01-20 RX ADMIN — SUCRALFATE 1 G: 1 SUSPENSION ORAL at 09:15

## 2024-01-20 RX ADMIN — LEVOTHYROXINE SODIUM 88 MCG: 88 TABLET ORAL at 05:40

## 2024-01-20 NOTE — UTILIZATION REVIEW
PLEASE SEE INITIAL OBSERVATION REVIEW COMPLETED ON 1/19/24 BY ADDISON JAIME RN    *CONVERTED TO INPATIENT 1/20/24 AT 0910 2ND SYMPTOMATIC ANEMIA + COVID REQUIRING CONTINUED MEDICAL MONITORING + MANAGEMENT AFTER OBSERVATION + > 2 MIDNITES - TREND H/H, TRANSFUSE PRN, RESPIRATORY MONITORING        Continued Stay Review  Date: 1/20/24                      Current Patient Class: 1/19/24 AT 0017 OBSERVATION -  INPATIENT 1/20/24 AT 0910    Current Level of Care: MED SURG    01/20/24 0910  Inpatient Admission  Once        Transfer Service: Hospitalist   Question Answer Comment   Level of Care Med Surg    Estimated length of stay More than 2 Midnights    Certification I certify that inpatient services are medically necessary for this patient for a duration of greater than two midnights. See H&P and MD Progress Notes for additional information about the patient's course of treatment.        01/20/24 0910   01/19/24 0018  Place in Observation  Once        Transfer Service: Hospitalist   Question: Level of Care Answer: Med Surg    01/19/24 0017       HPI: 83 year old female presented to [facility] ED on 1/18/24 from home via for evaluation and treatment of chest pain. PMHX: HTN, DM2,CKD.  Clinical assessment significant for positive covid test. TSH 10.980, D-DIMER 0.92, HB 7.5.  Imaging shows no acute finding. Initially treated with iv zofran x1.   Placed in Observation 1/19/24 at 0017 for symptomatic anemia and Covid.       Date: 1-19-23    Day 2: observation   Temp 97.3, tachypnea.  Transfuse 1U prbc. Obtain PT/OT evaluations. Trend CBC, electrolytes, BMP. Follow up urine cultures.     1/20/24 - DAY 3:  Has surpassed a 2nd midnight with active treatments and services.     *CONVERTED TO INPATIENT 1/20/24 AT 0910 2ND SYMPTOMATIC ANEMIA + COVID REQUIRING CONTINUED MEDICAL MONITORING + MANAGEMENT AFTER OBSERVATION + > 2 MIDNITES - TREND H/H, TRANSFUSE PRN, RESPIRATORY MONITORING        Vital Signs:   Date/Time Temp Pulse Resp  BP MAP (mmHg) SpO2 O2 Device Patient Position - Orthostatic VS   01/20/24 07:49:23 98.3 °F (36.8 °C) 71 -- 142/61 88 96 % -- --   01/19/24 21:38:15 -- 68 -- 108/58 75 95 % -- --   01/19/24 21:37:52 -- 67 -- 108/58 75 95 % -- --   01/19/24 15:38:25 99.9 °F (37.7 °C) 68 -- 107/61 76 96 % -- --   01/19/24 1241 -- -- -- -- -- -- None (Room air) --   01/19/24 07:58:29 98.7 °F (37.1 °C) 77 20 134/58 83 96 % -- --   01/19/24 0105 97.3 °F (36.3 °C) Abnormal  78 20 150/78 -- -- None (Room air) Lying   01/19/24 0045 -- 73 28 Abnormal  164/74 106 97 % None (Room air) Lying   01/18/24 2333 97.3 °F (36.3 °C) Abnormal  77 18 135/71 -- 98 % None (Room air) --   01/18/24 2330 -- 71 16 137/68 97 98 % None (Room air) Lying   01/18/24 2318 98.3 °F (36.8 °C) 71 16 142/83 -- 100 % None (Room air) --   01/18/24 2303 98.2 °F (36.8 °C) 72 15 140/71 -- 99 % None (Room air) --   01/18/24 2300 -- 75 11 Abnormal  140/71 98 98 % None (Room air) --   01/18/24 2247 98.3 °F (36.8 °C) 73 17 130/67 -- 98 % None (Room air) --   01/18/24 2230 -- 74 16 -- -- 96 % -- --   01/18/24 2130 -- 82 12 138/69 95 98 % -- --   01/18/24 2118 -- -- -- -- -- -- None (Room air) --   01/18/24 2115 -- -- -- 130/76 95 -- -- --   01/18/24 2113 99.1 °F (37.3 °C) 83 18 -- -- 95 % None (Room air) Lying      01/18 0701 01/19 0700 01/20 0701  -   Blood 300 2.1   Total Intake(mL/kg) 300 (4.8) 2.1 (0)   Net +300 +2.1        Unmeasured Urine Occurrence 1 x      Pertinent Labs/Diagnostic Results:   Results from last 7 days   Lab Units 01/18/24 2127   SARS-COV-2  Positive*     Results from last 7 days   Lab Units 01/20/24  0537 01/19/24  1512 01/18/24 2127   WBC Thousand/uL 4.07* 4.88 4.61   HEMOGLOBIN g/dL 9.0* 9.4* 7.5*   HEMATOCRIT % 31.9* 33.4* 26.9*   PLATELETS Thousands/uL 302 316 337   NEUTROS ABS Thousands/µL 2.25 3.51 3.07     Results from last 7 days   Lab Units 01/20/24 0537 01/18/24 2127   SODIUM mmol/L 140 139   POTASSIUM mmol/L 4.0 3.8   CHLORIDE mmol/L 106 107    CO2 mmol/L 28 23   ANION GAP mmol/L 6 9   BUN mg/dL 10 12   CREATININE mg/dL 1.02 1.06   EGFR ml/min/1.73sq m 50 48   CALCIUM mg/dL 9.2 8.9   MAGNESIUM mg/dL 2.1 2.0   PHOSPHORUS mg/dL 3.3  --      Results from last 7 days   Lab Units 01/18/24 2127   AST U/L 12*   ALT U/L 7   ALK PHOS U/L 44   TOTAL PROTEIN g/dL 6.1*   ALBUMIN g/dL 3.9   TOTAL BILIRUBIN mg/dL 0.30       Results from last 7 days   Lab Units 01/20/24  0537 01/18/24 2127   GLUCOSE RANDOM mg/dL 94 150*     Results from last 7 days   Lab Units 01/19/24  0154 01/19/24  0019 01/18/24 2127   HS TNI 0HR ng/L  --   --  5   HS TNI 2HR ng/L  --  6  --    HSTNI D2 ng/L  --  1  --    HS TNI 4HR ng/L 8  --   --    HSTNI D4 ng/L 3  --   --      Results from last 7 days   Lab Units 01/18/24 2127   D-DIMER QUANTITATIVE ug/ml FEU 0.92*     Results from last 7 days   Lab Units 01/18/24 2127   PROTIME seconds 13.8   INR  1.07   PTT seconds 33     Results from last 7 days   Lab Units 01/18/24 2127   TSH 3RD GENERATON uIU/mL 10.980*     Results from last 7 days   Lab Units 01/18/24 2127   FERRITIN ng/mL 5*   IRON SATURATION % 6*   IRON ug/dL 21*   TIBC ug/dL 371     Results from last 7 days   Lab Units 01/19/24  0633   UNIT PRODUCT CODE  [id number]   UNIT NUMBER  [id number]   UNITABO  O   UNITRH  POS   CROSSMATCH  Compatible   UNIT DISPENSE STATUS  Presumed Trans   UNIT PRODUCT VOL ml 350       Results from last 7 days   Lab Units 01/19/24  0836   CLARITY UA  Clear   COLOR UA  Light Yellow   SPEC GRAV UA  1.010   PH UA  6.5   GLUCOSE UA mg/dl Negative   KETONES UA mg/dl Negative   BLOOD UA  Negative   PROTEIN UA mg/dl Negative   NITRITE UA  Negative   BILIRUBIN UA  Negative   UROBILINOGEN UA E.U./dl 0.2   LEUKOCYTES UA  Trace*   WBC UA /hpf 4-10*   RBC UA /hpf 1-2   BACTERIA UA /hpf Occasional   EPITHELIAL CELLS WET PREP /hpf Occasional     Results from last 7 days   Lab Units 01/18/24  0246   INFLUENZA A PCR  Negative   INFLUENZA B PCR  Negative   RSV PCR   Negative     Diet Regular: House    Scheduled Medications:  atorvastatin, 20 mg, Oral, Daily With Dinner  vitamin B-12, 1,000 mcg, Oral, QAM  iron sucrose, 200 mg, Intravenous, Daily  levothyroxine, 88 mcg, Oral, Early Morning  melatonin, 3 mg, Oral, HS  pantoprazole, 40 mg, Oral, Daily  sucralfate, 1 g, Oral, BID    Continuous IV Infusions:  NONE    PRN Meds:  acetaminophen, 650 mg, Oral, Q4H PRN  Hydrocod Sunil-Chlorphe Sunil ER, 5 mL, Oral, Q12H PRN - 1/19  X 1 @ 1239  HYDROcodone-acetaminophen, 1 tablet, Oral, TID PRN - 1/19 X 1 @ 1901  ondansetron, 4 mg, Intravenous, Q6H PRN  oxymetazoline, 2 spray, Each Nare, Q12H PRN - 1/19 X 1 @ 1239     Discharge Plan:   To be determined   Inpatient Case Management following for all discharge needs    Network Utilization Review Department  ATTENTION: Please call with any questions or concerns to [phone] and carefully listen to the prompts so that you are directed to the right person. All voicemails are confidential.   For Discharge needs, contact Care Management DC Support Team at [phone] opt. 2  Send all requests for admission clinical reviews, approved or denied determinations and any other requests to dedicated fax number below belonging to the campus where the patient is receiving treatment. List of dedicated fax numbers for the Facilities:  FACILITY NAME UR FAX NUMBER   ADMISSION DENIALS (Administrative/Medical Necessity) [phone]   DISCHARGE SUPPORT TEAM (NETWORK) [phone]   PARENT CHILD HEALTH (Maternity/NICU/Pediatrics) [phone]   [facility] [phone]   [facility] [phone]   [facility] [phone]   [facility] [phone]   Critical access hospital [phone]   [facility] [phone]   [facility] [phone]   [facility]  [phone]   [facility] [phone]   [facility] [phone]   [facility] [phone]

## 2024-01-20 NOTE — PLAN OF CARE
Problem: PAIN - ADULT  Goal: Verbalizes/displays adequate comfort level or baseline comfort level  Description: Interventions:  - Encourage patient to monitor pain and request assistance  - Assess pain using appropriate pain scale  - Administer analgesics based on type and severity of pain and evaluate response  - Implement non-pharmacological measures as appropriate and evaluate response  - Consider cultural and social influences on pain and pain management  - Notify physician/advanced practitioner if interventions unsuccessful or patient reports new pain  1/19/2024 2215 by Polly Mccarthy RN  Outcome: Progressing  1/19/2024 1142 by Polly Mccarthy RN  Outcome: Progressing

## 2024-01-20 NOTE — ASSESSMENT & PLAN NOTE
COVID-19 positive on 1/18/2024  Remained hemodynamically stable and saturating well on room air at 100%  Continue supportive care at home

## 2024-01-20 NOTE — DISCHARGE INSTR - AVS FIRST PAGE
Start taking ferrous sulfate 325mg once daily. Take daily stool softener as needed as this medication may cause constipation  Outpatient referral placed to hematology for further evaluation and management of iron deficiency anemia   Increased levothyroxine 88mcg given low active thyroid hormone levels  Repeat TSH recommended in 6-8 weeks with PCP  Follow-up with primary care provider in 5-7 days

## 2024-01-20 NOTE — PLAN OF CARE
Problem: Potential for Falls  Goal: Patient will remain free of falls  Description: INTERVENTIONS:  - Educate patient/family on patient safety including physical limitations  - Instruct patient to call for assistance with activity   - Consult OT/PT to assist with strengthening/mobility   - Keep Call bell within reach  - Keep bed low and locked with side rails adjusted as appropriate  - Keep care items and personal belongings within reach  - Initiate and maintain comfort rounds  - Make Fall Risk Sign visible to staff  - Offer Toileting every 2 Hours, in advance of need  - Initiate/Maintain fall alarm  - Obtain necessary fall risk management equipment: non-skid footwear  - Apply yellow socks and bracelet for high fall risk patients  - Consider moving patient to room near nurses station  Outcome: Progressing     Problem: PAIN - ADULT  Goal: Verbalizes/displays adequate comfort level or baseline comfort level  Description: Interventions:  - Encourage patient to monitor pain and request assistance  - Assess pain using appropriate pain scale  - Administer analgesics based on type and severity of pain and evaluate response  - Implement non-pharmacological measures as appropriate and evaluate response  - Consider cultural and social influences on pain and pain management  - Notify physician/advanced practitioner if interventions unsuccessful or patient reports new pain  Outcome: Progressing     Problem: INFECTION - ADULT  Goal: Absence or prevention of progression during hospitalization  Description: INTERVENTIONS:  - Assess and monitor for signs and symptoms of infection  - Monitor lab/diagnostic results  - Monitor all insertion sites, i.e. indwelling lines, tubes, and drains  - Monitor endotracheal if appropriate and nasal secretions for changes in amount and color  - [place] appropriate cooling/warming therapies per order  - Administer medications as ordered  - Instruct and encourage patient and family to use good  hand hygiene technique  - Identify and instruct in appropriate isolation precautions for identified infection/condition  Outcome: Progressing  Goal: Absence of fever/infection during neutropenic period  Description: INTERVENTIONS:  - Monitor WBC    Outcome: Progressing

## 2024-01-20 NOTE — CASE MANAGEMENT
Case Management Discharge Planning Note    Patient name Charlee Botello  Location /415-01 MRN [id number]  : 1940 Date 2024       Current Admission Date: 2024  Current Admission Diagnosis:Symptomatic anemia   Patient Active Problem List    Diagnosis Date Noted    MOHINDER (iron deficiency anemia) 2024    COVID-19 2024    Stage 3b chronic kidney disease (HCC) 2023    Generalized osteoarthritis 2023    History of Helicobacter pylori infection 2023    Acquired renal cyst of left kidney 2023    Elevated d-dimer 2023    Symptomatic anemia 03/15/2022    Other chest pain 2021    Moderate protein-calorie malnutrition (HCC) 2021    Epigastric pain 2021    Essential hypertension 2021    Dyslipidemia 2021    Type 2 diabetes mellitus with stage 3b chronic kidney disease, without long-term current use of insulin (HCC) 2021    Acquired hypothyroidism 2021    Large hiatal hernia 2021    Hiatal hernia with GERD without esophagitis 2021    Hyperlipidemia 2021    Ulnar nerve abnormality 2020    Carpal tunnel syndrome of right wrist 2020    Carpal tunnel syndrome of left wrist 2020      LOS (days): 0  Geometric Mean LOS (GMLOS) (days):   Days to GMLOS:     OBJECTIVE:            Current admission status: Inpatient   Preferred Pharmacy:   [id number] [address] [address]  Phone: [phone] Fax: [phone]    Primary Care Provider: Timur Lawson MD    Primary Insurance: [employer]  Secondary Insurance: [facility]    DISCHARGE DETAILS:  Pt is being dc'd home on this date with OP follow up and Teddy TOBAR to follow. CM notified Teddy TOBAR in AIDIN of dc and AVS sent over.

## 2024-01-20 NOTE — ASSESSMENT & PLAN NOTE
With complaints of chest pain on admission that has now resolved  Likely symptomatic anemia as her hemoglobin was found to be 7.5 down from baseline of approximately 9  Status post 1 unit of packed red blood cells in the ED  No signs of bleeding. Hgb remains stable  Lab evaluation c/w MOHINDER, s/p IV Venofer. Discharged with ferrrous sulfate   Outpatient follow-up with Hematology

## 2024-01-20 NOTE — NURSING NOTE
AVS printed and discharge instructions reviewed with Patient including follow-up appointments and e-prescribed meds. IVs discontinued along with continuous monitoring device previously by Rocío FARMER. Patient dressed and ambulated into wheelchair. Transported by [facility] Agnieszka and accompanied by son to main entrance/exit. Patient discharged.

## 2024-01-20 NOTE — DISCHARGE SUMMARY
[facility]  Discharge- Charlee Botello 1940, 83 y.o. female MRN: [id number]  Unit/Bed#: 415-01 Encounter: [id number]  Primary Care Provider: Timur Lawson MD   Date and time admitted to hospital: 1/18/2024  9:08 PM    * Symptomatic anemia  Assessment & Plan  With complaints of chest pain on admission that has now resolved  Likely symptomatic anemia as her hemoglobin was found to be 7.5 down from baseline of approximately 9  Status post 1 unit of packed red blood cells in the ED  No signs of bleeding. Hgb remains stable  Lab evaluation c/w MOHINDER, s/p IV Venofer. Discharged with ferrrous sulfate   Outpatient follow-up with Hematology    COVID-19  Assessment & Plan  COVID-19 positive on 1/18/2024  Remained hemodynamically stable and saturating well on room air at 100%  Continue supportive care at home    Hiatal hernia with GERD without esophagitis  Assessment & Plan  EGD this past August remarkable for gastritis  Biopsy revealed no evidence of H. Pylori  Patient denies melenic stools, hematemesis, hematochezia  Continue pantoprazole 40 mg daily and sucralfate 1 g oral 2 times daily  Outpatient follow-up with gastroenterology  No evidence of active bleeding    Acquired hypothyroidism  Assessment & Plan  Increased to levothyroxine 88mcg   Repeat TSH/T4 in 6-8 weeks with PCP    Dyslipidemia  Assessment & Plan  Continue home statin therapy        Medical Problems       Resolved Problems  Date Reviewed: 1/20/2024   None       Discharging Physician / Practitioner: Yolanda Zuniga PA-C  PCP: Timur Lawson MD  Admission Date:   Admission Orders (From admission, onward)       Ordered        01/20/24 0910  Inpatient Admission  Once            01/19/24 0017  Place in Observation  Once                          Discharge Date: 01/20/24    Consultations During Hospital Stay:  PT/OT/CM    Procedures Performed:   None    Significant Findings / Test Results:    CXR: No acute cardiopulmonary disease.    CTA PE study, CT abd/pelvis: No pulmonary embolus. Mild wall thickening of the urinary bladder. Correlate for cystitis. Otherwise, no acute abnormality or suspicious mass   Iron panel:   Ferritin 5  Iron sat 6  TIBC 371  Iron 21  Free T4 0.54  Vitamin B12 276  Folate 6.7  Hgb 7.5>9.4>9.0    Incidental Findings:   None       Test Results Pending at Discharge (will require follow up):   None     Outpatient Tests Requested:  Follow-up hematology  Follow-up with primary care provider in 5-7 days   Repeat TSH/T4 in 6-8 weeks with PCP     Complications:  none     Reason for Admission: symptomatic anemia     Hospital Course:   Charlee Botello is a 83 y.o. female patient who originally presented to the hospital on 1/18/2024 due to symptomatic anemia with chest pain.  Hemoglobin 7.5 and she was given 1 unit PRBCs in ED.  Patient remained without any clinical signs and symptoms of active bleeding.  Additionally, her hemoglobin remained stable.  Iron panel was checked which revealed iron deficiency anemia.  She was treated with IV Venofer and discharged with ferrous sulfate daily.  She was given referral to establish with hematology.  Of note, patient also tested positive for COVID-19.  However she remained asymptomatic and stable on room air.  Additionally, patient was noted with low free T4 and her levothyroxine was increased to 88 mcg daily.  Recommend repeat TSH/T4 in 6 to 8 weeks.  Patient was discharged home in stable condition with home health care.    Please see daily progress notes for more detailed hospital course    Please see above list of diagnoses and related plan for additional information.     Condition at Discharge: stable    Discharge Day Visit / Exam:   Subjective:  No acute complaints today. Denies melena, hematochezia, hematemesis, N/V/D, abdominal pain.  Vitals: Blood Pressure: 142/61 (01/20/24 0749)  Pulse: 71 (01/20/24 0749)  Temperature: 98.3 °F (36.8 °C) (01/20/24 0749)  Temp Source: Oral (01/19/24  "0105)  Respirations: 20 (01/19/24 0758)  Height: 5' 2\" (157.5 cm) (01/19/24 0105)  Weight - Scale: 62.4 kg (137 lb 9.1 oz) (01/19/24 0105)  SpO2: 96 % (01/20/24 0749)  Exam:   Physical Exam  HENT:      Head: Normocephalic and atraumatic.   Cardiovascular:      Rate and Rhythm: Normal rate and regular rhythm.   Pulmonary:      Effort: Pulmonary effort is normal. No respiratory distress.      Breath sounds: Normal breath sounds. No wheezing.   Abdominal:      General: Abdomen is flat. There is no distension.      Palpations: Abdomen is soft.   Musculoskeletal:      Right lower leg: No edema.      Left lower leg: No edema.   Skin:     General: Skin is warm and dry.   Neurological:      General: No focal deficit present.      Mental Status: She is alert and oriented to person, place, and time.   Psychiatric:         Mood and Affect: Mood normal.         Behavior: Behavior normal.          Discussion with Family: Patient declined call to .     Discharge instructions/Information to patient and family:   See after visit summary for information provided to patient and family.      Provisions for Follow-Up Care:  See after visit summary for information related to follow-up care and any pertinent home health orders.      Mobility at time of Discharge:   Basic Mobility Inpatient Raw Score: 20  JH-HLM Goal: 6: Walk 10 steps or more  JH-HLM Achieved: 6: Walk 10 steps or more  HLM Goal achieved. Continue to encourage appropriate mobility.     Disposition:   Home with VNA Services (Reminder: Complete face to face encounter)    Planned Readmission: none     Discharge Statement:  I spent 45 minutes discharging the patient. This time was spent on the day of discharge. I had direct contact with the patient on the day of discharge. Greater than 50% of the total time was spent examining patient, answering all patient questions, arranging and discussing plan of care with patient as well as directly providing post-discharge " instructions.  Additional time then spent on discharge activities.    Discharge Medications:  See after visit summary for reconciled discharge medications provided to patient and/or family.      **Please Note: This note may have been constructed using a voice recognition system**

## 2024-01-21 NOTE — UTILIZATION REVIEW
NOTIFICATION OF INPATIENT ADMISSION   AUTHORIZATION REQUEST   SERVICING FACILITY:   [place] [address]  Tax ID:  [id number]  NPI: [id number] ATTENDING PROVIDER:  Attending Name and NPI#: Conrad Bashir  [[id number]]  Address: [address] [place] [address]  Phone: [phone]     ADMISSION INFORMATION:  Place of Service: Inpatient Acute [facility]  Place of Service Code: 21  Inpatient Admission Date/Time: 1/20/24  9:10 AM  Discharge Date/Time: 1/20/2024  2:23 PM  Admitting Diagnosis Code/Description:  Chest pain [R07.9]  Positive D dimer [R79.89]  Elevated TSH [R79.89]  Symptomatic anemia [D64.9]  Stage 3a chronic kidney disease (HCC) [N18.31]  Chest pain not due to acute coronary syndrome [R07.9]  COVID [U07.1]     UTILIZATION REVIEW CONTACT:  Tu Tomlinson, Utilization   Network Utilization Review Department  Phone: [phone]  Fax [phone]  Email: [email or web address]  Contact for approvals/pending authorizations, clinical reviews, and discharge.     PHYSICIAN ADVISORY SERVICES:  Medical Necessity Denial & Tgwr-ll-Sxnw Review  Phone: [phone]  Fax: [phone]  Email: [email or web address]     DISCHARGE SUPPORT TEAM:  For Patients Discharge Needs & Updates  Phone: [phone] opt. 2 Fax: [phone]  Email: [email or web address]

## 2024-01-22 ENCOUNTER — TELEPHONE (OUTPATIENT)
Dept: HEMATOLOGY ONCOLOGY | Facility: CLINIC | Age: 84
End: 2024-01-22

## 2024-01-22 NOTE — UTILIZATION REVIEW
NOTIFICATION OF ADMISSION DISCHARGE   This is a Notification of Discharge from [facility]. Please be advised that this patient has been discharge from our facility. Below you will find the admission and discharge date and time including the patient’s disposition.   UTILIZATION REVIEW CONTACT:  Tu Tomlinson  Utilization   Network Utilization Review Department  Phone: [phone] x carefully listen to the prompts. All voicemails are confidential.  Email: [email or web address]     ADMISSION INFORMATION  PRESENTATION DATE: 1/18/2024  9:08 PM  OBERVATION ADMISSION DATE:   INPATIENT ADMISSION DATE: 1/20/24  9:10 AM   DISCHARGE DATE: 1/20/2024  2:23 PM   DISPOSITION:Home with Home Health Care    Network Utilization Review Department  ATTENTION: Please call with any questions or concerns to [phone] and carefully listen to the prompts so that you are directed to the right person. All voicemails are confidential.   For Discharge needs, contact Care Management DC Support Team at [phone] opt. 2  Send all requests for admission clinical reviews, approved or denied determinations and any other requests to dedicated fax number below belonging to the campus where the patient is receiving treatment. List of dedicated fax numbers for the Facilities:  FACILITY NAME UR FAX NUMBER   ADMISSION DENIALS (Administrative/Medical Necessity) [phone]   DISCHARGE SUPPORT TEAM ([facility]) [phone]   PARENT CHILD HEALTH (Maternity/NICU/Pediatrics) [phone]   [facility] [phone]   [facility] [phone]   [facility] [phone]   General acute hospital [phone]   [facility] [phone]   [facility] [phone]   [facility] [phone]   [facility]  [phone]   [facility] [phone]   [facility] [phone]   [facility] [phone]

## 2024-01-22 NOTE — TELEPHONE ENCOUNTER
I called Charlee in response to a referral that was received for patient to establish care with Hematology.     Outreach was made to schedule a consultation.    Patient does not have a voicemail set up. Another attempt will be made to contact patient.

## 2024-01-23 ENCOUNTER — TELEPHONE (OUTPATIENT)
Dept: HEMATOLOGY ONCOLOGY | Facility: CLINIC | Age: 84
End: 2024-01-23

## 2024-01-24 ENCOUNTER — TELEPHONE (OUTPATIENT)
Dept: HEMATOLOGY ONCOLOGY | Facility: CLINIC | Age: 84
End: 2024-01-24

## 2024-01-24 NOTE — TELEPHONE ENCOUNTER
I called Charlee in response to a referral that was received for patient to establish care with Hematology.     Outreach was made to schedule a consultation.    Patient does not have a voicemail set up. This is the third attempt to schedule patient unsuccessfully. The referral has been closed, a [employer] message has been sent to patient (if applicable) and a letter has been sent to the address on file.

## 2024-09-11 ENCOUNTER — HOSPITAL ENCOUNTER (EMERGENCY)
Facility: HOSPITAL | Age: 84
Discharge: HOME | End: 2024-09-11
Attending: EMERGENCY MEDICINE
Payer: OTHER GOVERNMENT

## 2024-09-11 VITALS
OXYGEN SATURATION: 97 % | TEMPERATURE: 98.6 F | WEIGHT: 147.49 LBS | BODY MASS INDEX: 26.98 KG/M2 | RESPIRATION RATE: 16 BRPM | HEART RATE: 82 BPM | SYSTOLIC BLOOD PRESSURE: 153 MMHG | DIASTOLIC BLOOD PRESSURE: 80 MMHG

## 2024-09-11 DIAGNOSIS — E03.9 ACQUIRED HYPOTHYROIDISM: ICD-10-CM

## 2024-09-11 DIAGNOSIS — K29.70 GASTRITIS: ICD-10-CM

## 2024-09-11 DIAGNOSIS — K21.9 GERD (GASTROESOPHAGEAL REFLUX DISEASE): ICD-10-CM

## 2024-09-11 DIAGNOSIS — Z86.19 HISTORY OF HELICOBACTER PYLORI INFECTION: ICD-10-CM

## 2024-09-11 DIAGNOSIS — E03.9 HYPOTHYROID: ICD-10-CM

## 2024-09-11 DIAGNOSIS — R53.1 GENERALIZED WEAKNESS: Primary | ICD-10-CM

## 2024-09-11 DIAGNOSIS — R20.2 PARESTHESIA: ICD-10-CM

## 2024-09-11 LAB
2HR DELTA HS TROPONIN: 1 NG/L
ALBUMIN SERPL BCG-MCNC: 4.2 G/DL (ref 3.5–5)
ALP SERPL-CCNC: 47 U/L (ref 34–104)
ALT SERPL W P-5'-P-CCNC: 9 U/L (ref 7–52)
ANION GAP SERPL CALCULATED.3IONS-SCNC: 7 MMOL/L (ref 4–13)
AST SERPL W P-5'-P-CCNC: 14 U/L (ref 13–39)
BASOPHILS # BLD AUTO: 0.03 THOUSANDS/ÂΜL (ref 0–0.1)
BASOPHILS NFR BLD AUTO: 1 % (ref 0–1)
BILIRUB SERPL-MCNC: 0.28 MG/DL (ref 0.2–1)
BUN SERPL-MCNC: 12 MG/DL (ref 5–25)
CALCIUM SERPL-MCNC: 9.3 MG/DL (ref 8.4–10.2)
CARDIAC TROPONIN I PNL SERPL HS: 7 NG/L
CARDIAC TROPONIN I PNL SERPL HS: 8 NG/L
CHLORIDE SERPL-SCNC: 103 MMOL/L (ref 96–108)
CO2 SERPL-SCNC: 29 MMOL/L (ref 21–32)
CREAT SERPL-MCNC: 1.19 MG/DL (ref 0.6–1.3)
EOSINOPHIL # BLD AUTO: 0.07 THOUSAND/ÂΜL (ref 0–0.61)
EOSINOPHIL NFR BLD AUTO: 1 % (ref 0–6)
ERYTHROCYTE [DISTWIDTH] IN BLOOD BY AUTOMATED COUNT: 14.2 % (ref 11.6–15.1)
GFR SERPL CREATININE-BSD FRML MDRD: 41 ML/MIN/1.73SQ M
GLUCOSE SERPL-MCNC: 119 MG/DL (ref 65–140)
HCT VFR BLD AUTO: 32.9 % (ref 34.8–46.1)
HGB BLD-MCNC: 9.7 G/DL (ref 11.5–15.4)
IMM GRANULOCYTES # BLD AUTO: 0.01 THOUSAND/UL (ref 0–0.2)
IMM GRANULOCYTES NFR BLD AUTO: 0 % (ref 0–2)
LYMPHOCYTES # BLD AUTO: 1.41 THOUSANDS/ÂΜL (ref 0.6–4.47)
LYMPHOCYTES NFR BLD AUTO: 27 % (ref 14–44)
MCH RBC QN AUTO: 26.9 PG (ref 26.8–34.3)
MCHC RBC AUTO-ENTMCNC: 29.5 G/DL (ref 31.4–37.4)
MCV RBC AUTO: 91 FL (ref 82–98)
MONOCYTES # BLD AUTO: 0.39 THOUSAND/ÂΜL (ref 0.17–1.22)
MONOCYTES NFR BLD AUTO: 8 % (ref 4–12)
NEUTROPHILS # BLD AUTO: 3.24 THOUSANDS/ÂΜL (ref 1.85–7.62)
NEUTS SEG NFR BLD AUTO: 63 % (ref 43–75)
NRBC BLD AUTO-RTO: 0 /100 WBCS
PLATELET # BLD AUTO: 305 THOUSANDS/UL (ref 149–390)
PMV BLD AUTO: 9.3 FL (ref 8.9–12.7)
POTASSIUM SERPL-SCNC: 4.1 MMOL/L (ref 3.5–5.3)
PROT SERPL-MCNC: 6.8 G/DL (ref 6.4–8.4)
RBC # BLD AUTO: 3.6 MILLION/UL (ref 3.81–5.12)
SODIUM SERPL-SCNC: 139 MMOL/L (ref 135–147)
TSH SERPL DL<=0.05 MIU/L-ACNC: 12.05 UIU/ML (ref 0.45–4.5)
WBC # BLD AUTO: 5.15 THOUSAND/UL (ref 4.31–10.16)

## 2024-09-11 PROCEDURE — 85025 COMPLETE CBC W/AUTO DIFF WBC: CPT | Performed by: EMERGENCY MEDICINE

## 2024-09-11 PROCEDURE — 80053 COMPREHEN METABOLIC PANEL: CPT | Performed by: EMERGENCY MEDICINE

## 2024-09-11 PROCEDURE — 84443 ASSAY THYROID STIM HORMONE: CPT | Performed by: EMERGENCY MEDICINE

## 2024-09-11 PROCEDURE — 99285 EMERGENCY DEPT VISIT HI MDM: CPT

## 2024-09-11 PROCEDURE — 36415 COLL VENOUS BLD VENIPUNCTURE: CPT | Performed by: EMERGENCY MEDICINE

## 2024-09-11 PROCEDURE — 84484 ASSAY OF TROPONIN QUANT: CPT | Performed by: EMERGENCY MEDICINE

## 2024-09-11 PROCEDURE — 99285 EMERGENCY DEPT VISIT HI MDM: CPT | Performed by: EMERGENCY MEDICINE

## 2024-09-11 PROCEDURE — 84439 ASSAY OF FREE THYROXINE: CPT | Performed by: EMERGENCY MEDICINE

## 2024-09-11 PROCEDURE — 93005 ELECTROCARDIOGRAM TRACING: CPT

## 2024-09-11 RX ORDER — SUCRALFATE ORAL 1 G/10ML
1 SUSPENSION ORAL 2 TIMES DAILY
Qty: 414 ML | Refills: 0 | Status: SHIPPED | OUTPATIENT
Start: 2024-09-11

## 2024-09-11 RX ORDER — PANTOPRAZOLE SODIUM 40 MG/1
40 TABLET, DELAYED RELEASE ORAL DAILY
Qty: 30 TABLET | Refills: 0 | Status: SHIPPED | OUTPATIENT
Start: 2024-09-11 | End: 2024-10-11

## 2024-09-11 RX ORDER — LEVOTHYROXINE SODIUM 88 UG/1
88 TABLET ORAL
Qty: 30 TABLET | Refills: 0 | Status: SHIPPED | OUTPATIENT
Start: 2024-09-11 | End: 2024-10-11

## 2024-09-11 NOTE — ED PROVIDER NOTES
1. Generalized weakness    2. Paresthesia    3. GERD (gastroesophageal reflux disease)    4. Acquired hypothyroidism    5. History of Helicobacter pylori infection    6. Gastritis    7. Hypothyroid      ED Disposition       ED Disposition   Discharge    Condition   Stable    Date/Time   Wed Sep 11, 2024  7:11 PM    Comment   Charlee Botello discharge to home/self care.                   Assessment & Plan       Medical Decision Making  MDM/DDx: Left-sided chest pain/generalized myalgias/difficulty concentrating -reflux, hiatal hernia, costochondritis, abnormal electrolytes, ARF, less likely but at risk for ACS/MI, anemia, clinically doubt pneumonia, pneumothorax or PE, no reproducible or clinical evidence of orthostasis or dysrhythmia.     I independently reviewed and interpreted ordered labs and EKG from this encounter.    A/P: Will check cardiac workup plus TSH, treat symptoms, reevaluate for further work up and disposition.    Amount and/or Complexity of Data Reviewed  Labs: ordered. Decision-making details documented in ED Course.  ECG/medicine tests: ordered and independent interpretation performed. Decision-making details documented in ED Course.    Risk  Prescription drug management.                  ED Course as of 09/11/24 1914   Wed Sep 11, 2024   1718 Hemoglobin(!): 9.7  Has been 7.5 in the past but baseline appears to be 9.1-9.4.   1742 TSH 3RD GENERATON(!): 12.052  Previously in the 5s, was 10 seven months ago.   1909 Results reviewed with patient. Feels okay. All questions answered to the patient's satisfaction.  She now tells me that she stopped taking all of her medicines several weeks ago without her physician's input or recommendation.  Recommend resuming essential medicines at home and follow up with PCP for titration and stability.         Medications - No data to display    History of Present Illness       84-year-old female presents via EMS with a circuitous story of pain throughout her body that  "has been occurring for weeks to months that sometimes makes her feel \"weird\" and have difficulty focusing.  She states she has difficulty ambulating due to the pains and has a diminished appetite.  She states her PCP visits her in her home but she either has not told him about some of the symptoms or he has not done anything to help her except send to her here.      One focal complaint is chest pain that starts under her left breast and radiates up her sternum to the base of her throat.  She states she was seen before and was told her heart was good.  She states she does not have indigestion but takes Carafate and Protonix.  She states that pain will start in her foot and rise up to her neck and head.  She has gotten it on her left side for months but when she got it on her right side recently, she became more concerned.  She states that she took Tylenol for the pain but also has Norco on her medication list.  It is unclear if she is taking that.  She presents without focal deficit or acute finding on physical exam.    No recent travel or sick contacts. Denies f/c, HA, CP, SOB, abdominal pain, n/v/d. 12 system ROS o/w negative.            History provided by:  Patient, medical records and EMS personnel  Chest Pain  Associated symptoms: abdominal pain, fatigue, numbness and weakness    Associated symptoms: no back pain, no cough, no diaphoresis, no dizziness, no dysphagia, no fever, no headache, no nausea, no palpitations, no shortness of breath and not vomiting    Risk factors: diabetes mellitus and hypertension    Risk factors: no coronary artery disease            Review of Systems   Constitutional:  Positive for activity change, appetite change and fatigue. Negative for diaphoresis and fever.   HENT:  Negative for rhinorrhea, sore throat and trouble swallowing.    Respiratory:  Positive for chest tightness. Negative for cough, shortness of breath and wheezing.    Cardiovascular:  Positive for chest pain. Negative " for palpitations and leg swelling.   Gastrointestinal:  Positive for abdominal pain. Negative for abdominal distention, constipation, diarrhea, nausea and vomiting.   Endocrine: Positive for cold intolerance.   Genitourinary:  Negative for difficulty urinating, dysuria, flank pain, frequency, hematuria and urgency.   Musculoskeletal:  Positive for gait problem and myalgias. Negative for arthralgias, back pain, joint swelling, neck pain and neck stiffness.   Skin:  Negative for pallor and rash.   Neurological:  Positive for weakness, light-headedness and numbness. Negative for dizziness, seizures, syncope, speech difficulty and headaches.   Hematological:  Negative for adenopathy.   Psychiatric/Behavioral:  Positive for dysphoric mood. Negative for confusion. The patient is not nervous/anxious.    All other systems reviewed and are negative.          Objective     ED Triage Vitals   Temperature Pulse Blood Pressure Respirations SpO2 Patient Position - Orthostatic VS   09/11/24 1648 09/11/24 1645 09/11/24 1645 09/11/24 1645 09/11/24 1645 09/11/24 1645   98.4 °F (36.9 °C) 87 (!) 184/88 16 98 % Lying      Temp Source Heart Rate Source BP Location FiO2 (%) Pain Score    09/11/24 1648 09/11/24 1645 09/11/24 1645 -- 09/11/24 1645    Temporal Monitor Left arm  5        Physical Exam  Vitals reviewed.   Constitutional:       General: She is not in acute distress.     Appearance: Normal appearance. She is well-developed. She is not ill-appearing, toxic-appearing or diaphoretic.   HENT:      Head: Normocephalic and atraumatic.      Right Ear: External ear normal.      Left Ear: External ear normal.      Nose: Nose normal.      Mouth/Throat:      Mouth: Mucous membranes are moist.      Pharynx: Oropharynx is clear. No oropharyngeal exudate.   Eyes:      General: No scleral icterus.     Conjunctiva/sclera: Conjunctivae normal.      Pupils: Pupils are equal, round, and reactive to light.   Cardiovascular:      Rate and Rhythm:  Normal rate and regular rhythm.      Heart sounds: No murmur heard.  Pulmonary:      Effort: Pulmonary effort is normal. No respiratory distress.      Breath sounds: Normal breath sounds.   Chest:      Chest wall: Tenderness (Diffusely) present.   Abdominal:      General: Bowel sounds are normal. There is no distension.      Palpations: Abdomen is soft.      Tenderness: There is abdominal tenderness (Nonspecific). There is right CVA tenderness and left CVA tenderness.   Musculoskeletal:         General: Tenderness (Diffusely) present. Normal range of motion.      Cervical back: Normal range of motion and neck supple.      Right lower leg: No edema.      Left lower leg: No edema.   Lymphadenopathy:      Cervical: No cervical adenopathy.   Skin:     General: Skin is warm and dry.      Coloration: Skin is not pale.      Findings: No erythema or rash.   Neurological:      General: No focal deficit present.      Mental Status: She is alert and oriented to person, place, and time.      Motor: No weakness or abnormal muscle tone.      Deep Tendon Reflexes: Reflexes are normal and symmetric.   Psychiatric:         Behavior: Behavior normal.         Thought Content: Thought content normal.         Labs Reviewed   CBC AND DIFFERENTIAL - Abnormal       Result Value    WBC 5.15      RBC 3.60 (*)     Hemoglobin 9.7 (*)     Hematocrit 32.9 (*)     MCV 91      MCH 26.9      MCHC 29.5 (*)     RDW 14.2      MPV 9.3      Platelets 305      nRBC 0      Segmented % 63      Immature Grans % 0      Lymphocytes % 27      Monocytes % 8      Eosinophils Relative 1      Basophils Relative 1      Absolute Neutrophils 3.24      Absolute Immature Grans 0.01      Absolute Lymphocytes 1.41      Absolute Monocytes 0.39      Eosinophils Absolute 0.07      Basophils Absolute 0.03     TSH, 3RD GENERATION WITH FREE T4 REFLEX - Abnormal    TSH 3RD GENERATON 12.052 (*)    HS TROPONIN I 0HR - Normal    hs TnI 0hr 7     COMPREHENSIVE METABOLIC PANEL     Sodium 139      Potassium 4.1      Chloride 103      CO2 29      ANION GAP 7      BUN 12      Creatinine 1.19      Glucose 119      Calcium 9.3      AST 14      ALT 9      Alkaline Phosphatase 47      Total Protein 6.8      Albumin 4.2      Total Bilirubin 0.28      eGFR 41      Narrative:     National Kidney Disease Foundation guidelines for Chronic Kidney Disease (CKD):     Stage 1 with normal or high GFR (GFR > 90 mL/min/1.73 square meters)    Stage 2 Mild CKD (GFR = 60-89 mL/min/1.73 square meters)    Stage 3A Moderate CKD (GFR = 45-59 mL/min/1.73 square meters)    Stage 3B Moderate CKD (GFR = 30-44 mL/min/1.73 square meters)    Stage 4 Severe CKD (GFR = 15-29 mL/min/1.73 square meters)    Stage 5 End Stage CKD (GFR <15 mL/min/1.73 square meters)  Note: GFR calculation is accurate only with a steady state creatinine   HS TROPONIN I 2HR   T4, FREE   HS TROPONIN I 4HR     No orders to display       ECG 12 Lead Documentation Only    Date/Time: 9/11/2024 5:04 PM    Performed by: Arnold Puente DO  Authorized by: Arnold Puente DO    Indications / Diagnosis:  Chest pain  ECG reviewed by me, the ED Provider: yes    Patient location:  ED  Interpretation:     Interpretation: normal    Rate:     ECG rate:  80    ECG rate assessment: normal    Rhythm:     Rhythm: sinus rhythm    Ectopy:     Ectopy: none    Conduction:     Conduction: normal    ST segments:     ST segments:  Normal  T waves:     T waves: normal           Arnold Puente DO  09/11/24 1914

## 2024-09-11 NOTE — ED NOTES
Patient ambulatory to waiting room, she contacted family to pick her up     Manda Ballard, AMANDA  09/11/24 1947

## 2024-09-12 LAB — T4 FREE SERPL-MCNC: 0.75 NG/DL (ref 0.61–1.12)

## 2024-09-13 LAB
ATRIAL RATE: 80 BPM
P AXIS: 58 DEGREES
PR INTERVAL: 148 MS
QRS AXIS: 58 DEGREES
QRSD INTERVAL: 78 MS
QT INTERVAL: 366 MS
QTC INTERVAL: 422 MS
T WAVE AXIS: 35 DEGREES
VENTRICULAR RATE: 80 BPM

## 2024-09-13 PROCEDURE — 93010 ELECTROCARDIOGRAM REPORT: CPT | Performed by: INTERNAL MEDICINE

## 2025-07-14 ENCOUNTER — HOSPITAL ENCOUNTER (INPATIENT)
Facility: HOSPITAL | Age: 85
LOS: 2 days | Discharge: HOME HEALTH | End: 2025-07-16
Attending: EMERGENCY MEDICINE | Admitting: FAMILY MEDICINE
Payer: COMMERCIAL

## 2025-07-14 ENCOUNTER — APPOINTMENT (EMERGENCY)
Dept: CT IMAGING | Facility: HOSPITAL | Age: 85
End: 2025-07-14
Payer: COMMERCIAL

## 2025-07-14 ENCOUNTER — APPOINTMENT (EMERGENCY)
Dept: RADIOLOGY | Facility: HOSPITAL | Age: 85
End: 2025-07-14
Payer: COMMERCIAL

## 2025-07-14 DIAGNOSIS — U07.1 COVID-19: ICD-10-CM

## 2025-07-14 DIAGNOSIS — K44.9 HIATAL HERNIA: ICD-10-CM

## 2025-07-14 DIAGNOSIS — E03.9 ACQUIRED HYPOTHYROIDISM: ICD-10-CM

## 2025-07-14 DIAGNOSIS — D50.9 IRON DEFICIENCY ANEMIA, UNSPECIFIED IRON DEFICIENCY ANEMIA TYPE: ICD-10-CM

## 2025-07-14 DIAGNOSIS — D64.9 ACUTE ANEMIA: Primary | ICD-10-CM

## 2025-07-14 DIAGNOSIS — D64.9 SYMPTOMATIC ANEMIA: ICD-10-CM

## 2025-07-14 DIAGNOSIS — M15.9 GENERALIZED OSTEOARTHRITIS: Chronic | ICD-10-CM

## 2025-07-14 DIAGNOSIS — R41.81 AGE-RELATED COGNITIVE DECLINE: ICD-10-CM

## 2025-07-14 LAB
ABO GROUP BLD: NORMAL
ALBUMIN SERPL BCG-MCNC: 3.7 G/DL (ref 3.5–5)
ALP SERPL-CCNC: 46 U/L (ref 34–104)
ALT SERPL W P-5'-P-CCNC: 6 U/L (ref 7–52)
ANION GAP SERPL CALCULATED.3IONS-SCNC: 8 MMOL/L (ref 4–13)
AST SERPL W P-5'-P-CCNC: 11 U/L (ref 13–39)
BASOPHILS # BLD AUTO: 0.02 THOUSANDS/ÂΜL (ref 0–0.1)
BASOPHILS NFR BLD AUTO: 0 % (ref 0–1)
BILIRUB SERPL-MCNC: 0.3 MG/DL (ref 0.2–1)
BLD GP AB SCN SERPL QL: NEGATIVE
BUN SERPL-MCNC: 12 MG/DL (ref 5–25)
CALCIUM SERPL-MCNC: 8.6 MG/DL (ref 8.4–10.2)
CARDIAC TROPONIN I PNL SERPL HS: 5 NG/L (ref ?–50)
CHLORIDE SERPL-SCNC: 106 MMOL/L (ref 96–108)
CO2 SERPL-SCNC: 26 MMOL/L (ref 21–32)
CREAT SERPL-MCNC: 1.15 MG/DL (ref 0.6–1.3)
EOSINOPHIL # BLD AUTO: 0.05 THOUSAND/ÂΜL (ref 0–0.61)
EOSINOPHIL NFR BLD AUTO: 1 % (ref 0–6)
ERYTHROCYTE [DISTWIDTH] IN BLOOD BY AUTOMATED COUNT: 16.6 % (ref 11.6–15.1)
GFR SERPL CREATININE-BSD FRML MDRD: 43 ML/MIN/1.73SQ M
GLUCOSE SERPL-MCNC: 132 MG/DL (ref 65–140)
HCT VFR BLD AUTO: 22.6 % (ref 34.8–46.1)
HGB BLD-MCNC: 6.3 G/DL (ref 11.5–15.4)
IMM GRANULOCYTES # BLD AUTO: 0.02 THOUSAND/UL (ref 0–0.2)
IMM GRANULOCYTES NFR BLD AUTO: 0 % (ref 0–2)
LIPASE SERPL-CCNC: 32 U/L (ref 11–82)
LYMPHOCYTES # BLD AUTO: 0.93 THOUSANDS/ÂΜL (ref 0.6–4.47)
LYMPHOCYTES NFR BLD AUTO: 17 % (ref 14–44)
MCH RBC QN AUTO: 22.7 PG (ref 26.8–34.3)
MCHC RBC AUTO-ENTMCNC: 27.9 G/DL (ref 31.4–37.4)
MCV RBC AUTO: 82 FL (ref 82–98)
MONOCYTES # BLD AUTO: 0.38 THOUSAND/ÂΜL (ref 0.17–1.22)
MONOCYTES NFR BLD AUTO: 7 % (ref 4–12)
NEUTROPHILS # BLD AUTO: 4.04 THOUSANDS/ÂΜL (ref 1.85–7.62)
NEUTS SEG NFR BLD AUTO: 75 % (ref 43–75)
NRBC BLD AUTO-RTO: 0 /100 WBCS
PLATELET # BLD AUTO: 286 THOUSANDS/UL (ref 149–390)
PMV BLD AUTO: 9.3 FL (ref 8.9–12.7)
POTASSIUM SERPL-SCNC: 4.1 MMOL/L (ref 3.5–5.3)
PROT SERPL-MCNC: 6.1 G/DL (ref 6.4–8.4)
RBC # BLD AUTO: 2.77 MILLION/UL (ref 3.81–5.12)
RH BLD: POSITIVE
SODIUM SERPL-SCNC: 140 MMOL/L (ref 135–147)
SPECIMEN EXPIRATION DATE: NORMAL
WBC # BLD AUTO: 5.44 THOUSAND/UL (ref 4.31–10.16)

## 2025-07-14 PROCEDURE — 86900 BLOOD TYPING SEROLOGIC ABO: CPT

## 2025-07-14 PROCEDURE — 99285 EMERGENCY DEPT VISIT HI MDM: CPT | Performed by: EMERGENCY MEDICINE

## 2025-07-14 PROCEDURE — 93005 ELECTROCARDIOGRAM TRACING: CPT

## 2025-07-14 PROCEDURE — 71045 X-RAY EXAM CHEST 1 VIEW: CPT

## 2025-07-14 PROCEDURE — P9016 RBC LEUKOCYTES REDUCED: HCPCS

## 2025-07-14 PROCEDURE — 86850 RBC ANTIBODY SCREEN: CPT

## 2025-07-14 PROCEDURE — 96366 THER/PROPH/DIAG IV INF ADDON: CPT

## 2025-07-14 PROCEDURE — 99285 EMERGENCY DEPT VISIT HI MDM: CPT

## 2025-07-14 PROCEDURE — 80053 COMPREHEN METABOLIC PANEL: CPT

## 2025-07-14 PROCEDURE — 36415 COLL VENOUS BLD VENIPUNCTURE: CPT

## 2025-07-14 PROCEDURE — 83690 ASSAY OF LIPASE: CPT

## 2025-07-14 PROCEDURE — 86901 BLOOD TYPING SEROLOGIC RH(D): CPT

## 2025-07-14 PROCEDURE — 96365 THER/PROPH/DIAG IV INF INIT: CPT

## 2025-07-14 PROCEDURE — 74176 CT ABD & PELVIS W/O CONTRAST: CPT

## 2025-07-14 PROCEDURE — 86923 COMPATIBILITY TEST ELECTRIC: CPT

## 2025-07-14 PROCEDURE — 30233N1 TRANSFUSION OF NONAUTOLOGOUS RED BLOOD CELLS INTO PERIPHERAL VEIN, PERCUTANEOUS APPROACH: ICD-10-PCS | Performed by: EMERGENCY MEDICINE

## 2025-07-14 PROCEDURE — 85025 COMPLETE CBC W/AUTO DIFF WBC: CPT

## 2025-07-14 PROCEDURE — 36430 TRANSFUSION BLD/BLD COMPNT: CPT

## 2025-07-14 PROCEDURE — 99222 1ST HOSP IP/OBS MODERATE 55: CPT | Performed by: FAMILY MEDICINE

## 2025-07-14 PROCEDURE — 84484 ASSAY OF TROPONIN QUANT: CPT

## 2025-07-14 RX ORDER — OXYCODONE HYDROCHLORIDE 5 MG/1
5 TABLET ORAL EVERY 6 HOURS PRN
Refills: 0 | Status: DISCONTINUED | OUTPATIENT
Start: 2025-07-14 | End: 2025-07-16 | Stop reason: HOSPADM

## 2025-07-14 RX ORDER — HEPARIN SODIUM 5000 [USP'U]/ML
5000 INJECTION, SOLUTION INTRAVENOUS; SUBCUTANEOUS EVERY 8 HOURS SCHEDULED
Status: DISCONTINUED | OUTPATIENT
Start: 2025-07-14 | End: 2025-07-15

## 2025-07-14 RX ORDER — FERROUS SULFATE 325(65) MG
325 TABLET ORAL 2 TIMES DAILY WITH MEALS
Status: DISCONTINUED | OUTPATIENT
Start: 2025-07-15 | End: 2025-07-16 | Stop reason: HOSPADM

## 2025-07-14 RX ORDER — ACETAMINOPHEN 325 MG/1
650 TABLET ORAL EVERY 6 HOURS PRN
Status: DISCONTINUED | OUTPATIENT
Start: 2025-07-14 | End: 2025-07-16 | Stop reason: HOSPADM

## 2025-07-14 RX ORDER — ASCORBIC ACID 500 MG
500 TABLET ORAL 2 TIMES DAILY
Status: DISCONTINUED | OUTPATIENT
Start: 2025-07-15 | End: 2025-07-16 | Stop reason: HOSPADM

## 2025-07-14 RX ORDER — LEVOTHYROXINE SODIUM 88 UG/1
88 TABLET ORAL
Status: DISCONTINUED | OUTPATIENT
Start: 2025-07-15 | End: 2025-07-16 | Stop reason: HOSPADM

## 2025-07-14 RX ORDER — ACETAMINOPHEN 10 MG/ML
1000 INJECTION, SOLUTION INTRAVENOUS ONCE
Status: COMPLETED | OUTPATIENT
Start: 2025-07-14 | End: 2025-07-14

## 2025-07-14 RX ORDER — PANTOPRAZOLE SODIUM 40 MG/1
40 TABLET, DELAYED RELEASE ORAL
Status: DISCONTINUED | OUTPATIENT
Start: 2025-07-15 | End: 2025-07-15

## 2025-07-14 RX ADMIN — SODIUM CHLORIDE 1000 ML: 0.9 INJECTION, SOLUTION INTRAVENOUS at 17:28

## 2025-07-14 RX ADMIN — ACETAMINOPHEN 1000 MG: 1000 INJECTION, SOLUTION INTRAVENOUS at 17:28

## 2025-07-14 NOTE — ED PROVIDER NOTES
"Time reflects when diagnosis was documented in both MDM as applicable and the Disposition within this note       Time User Action Codes Description Comment    7/14/2025  7:42 PM Hilary Estrella Add [D64.9] Acute anemia     7/14/2025  7:42 PM Hilary Estrella Add [K44.9] Hiatal hernia           ED Disposition       ED Disposition   Admit    Condition   Stable    Date/Time   Mon Jul 14, 2025  7:41 PM    Comment   Case was discussed with SLIM and the patient's admission status was agreed to be Admission Status: inpatient status to the service of Dr. Reynoso.               Assessment & Plan       Medical Decision Making  86 y/o female PMH of HTN, type 2 diabetes, CKD, iron deficiency anemia, large hiatal hernia here for 4 days of pain under her left breast as well as the left upper quadrant. Denies fever, chills, shortness of breath, headache, dizziness/lightheadedness, vision changes, urinary symptoms, N/V/D. She is a poor historian.  Patient is well-appearing on exam, in no distress.  Vitals are normal except for elevated blood pressure reading.  Abdominal tenderness to palpation in the left upper quadrant.  Hemoglobin came back at 6.3, was 9.7 ten months ago.  She admits to needing transfusions in the past, but is unable to tell me why she had them.  Other labs unremarkable.  Ordered 1 unit of PRBCs in ED. CT abdomen/pelvis without contrast showed \"Luminal narrowing and possible wall thickening of a short segment of the ascending colon may be due to peristalsis. However, an underlying lesion cannot be excluded, and short interval follow-up CT abdomen/pelvis with oral and intravenous contrast is recommended for further assessment. Review of medical and colonoscopy history is advised to determine whether colonoscopy is warranted at this time (instead of repeat CT). Given the absence of overlying inflammation, acute focal colitis is considered less likely. Otherwise no potential acute abdominopelvic pathology.\"  " Discussed case with SLIM, she is to be admitted under service of Dr. Reynoso.      Amount and/or Complexity of Data Reviewed  Labs: ordered. Decision-making details documented in ED Course.  Radiology: ordered. Decision-making details documented in ED Course.    Risk  Prescription drug management.  Decision regarding hospitalization.        ED Course as of 07/14/25 1947 Mon Jul 14, 2025   1743 Hemoglobin(!): 6.3  Was 9.7 ten months ago, will get blood consent form   1756 Comprehensive metabolic panel(!)  Unremarkable    1756 LIPASE: 32   1823 hs TnI 0hr: 5  No need for delta; symptoms have been going on for a couple days   1928 CT abdomen pelvis wo contrast  Luminal narrowing and possible wall thickening of a short segment of the ascending colon may be due to peristalsis. However, an underlying lesion cannot be excluded, and short interval follow-up CT abdomen/pelvis with oral and intravenous contrast is   recommended for further assessment. Review of medical and colonoscopy history is advised to determine whether colonoscopy is warranted at this time (instead of repeat CT). Given the absence of overlying inflammation, acute focal colitis is considered   less likely. Otherwise no potential acute abdominopelvic pathology.         Medications   sodium chloride 0.9 % bolus 1,000 mL (1,000 mL Intravenous New Bag 7/14/25 1728)   acetaminophen (Ofirmev) injection 1,000 mg (0 mg Intravenous Stopped 7/14/25 1919)       ED Risk Strat Scores                    No data recorded        SBIRT 22yo+      Flowsheet Row Most Recent Value   Initial Alcohol Screen: US AUDIT-C     1. How often do you have a drink containing alcohol? 0 Filed at: 07/14/2025 1736   2. How many drinks containing alcohol do you have on a typical day you are drinking?  0 Filed at: 07/14/2025 1736   3b. FEMALE Any Age, or MALE 65+: How often do you have 4 or more drinks on one occassion? 0 Filed at: 07/14/2025 1736   Audit-C Score 0 Filed at: 07/14/2025 1736    JOSE: How many times in the past year have you...    Used an illegal drug or used a prescription medication for non-medical reasons? Never Filed at: 07/14/2025 8310                            History of Present Illness       Chief Complaint   Patient presents with    Chest Pain     Pt reports L sided chest pain that radiates across her chest.        Past Medical History[1]   Past Surgical History[2]   Family History[3]   Social History[4]   E-Cigarette/Vaping    E-Cigarette Use Never User       E-Cigarette/Vaping Substances    Nicotine No     THC No     CBD No       I have reviewed and agree with the history as documented.     Patient is an 84 y/o female PMH of HTN, type 2 diabetes, CKD, iron deficiency anemia, large hiatal hernia here for 4 days of pain under her left breast as well as the left upper quadrant. Denies fever, chills, shortness of breath, headache, dizziness/lightheadedness, vision changes, urinary symptoms, N/V/D. Unsure if anything makes it better or worse.  Has had blood transfusions in the past, however is unsure of why she drops her hemoglobin.      Chest Pain  Associated symptoms: abdominal pain    Associated symptoms: no back pain, no cough, no dizziness, no fever, no headache, no nausea, no palpitations, no shortness of breath, not vomiting and no weakness        Review of Systems   Constitutional:  Negative for chills and fever.   HENT:  Negative for congestion, ear pain, rhinorrhea and sore throat.    Eyes:  Negative for pain and visual disturbance.   Respiratory:  Negative for cough, chest tightness, shortness of breath and wheezing.    Cardiovascular:  Positive for chest pain. Negative for palpitations.   Gastrointestinal:  Positive for abdominal pain. Negative for diarrhea, nausea and vomiting.   Genitourinary:  Negative for difficulty urinating, dysuria, flank pain, frequency, hematuria and urgency.   Musculoskeletal:  Negative for arthralgias, back pain, myalgias, neck pain and neck  stiffness.   Skin:  Negative for color change and rash.   Neurological:  Negative for dizziness, seizures, syncope, weakness, light-headedness and headaches.   All other systems reviewed and are negative.      Objective       ED Triage Vitals [07/14/25 1706]   Temperature Pulse Blood Pressure Respirations SpO2 Patient Position - Orthostatic VS   98.6 °F (37 °C) 80 (!) 176/81 18 98 % Sitting      Temp Source Heart Rate Source BP Location FiO2 (%) Pain Score    Temporal Monitor Right arm -- 9      Vitals      Date and Time Temp Pulse SpO2 Resp BP Pain Score FACES Pain Rating User   07/14/25 1944 98.2 °F (36.8 °C) 78 98 % 21 184/88 -- -- TB   07/14/25 1930 98.1 °F (36.7 °C) 79 98 % 20 179/84 4 -- TB   07/14/25 1900 -- 72 97 % 22 149/87 5 -- TB   07/14/25 1830 -- 74 97 % 19 195/87 9 -- TB   07/14/25 1740 -- -- -- -- -- 9 -- TB   07/14/25 1706 98.6 °F (37 °C) 80 98 % 18 176/81 9 -- CD            Physical Exam  Vitals and nursing note reviewed.   Constitutional:       General: She is not in acute distress.     Appearance: She is well-developed. She is not ill-appearing, toxic-appearing or diaphoretic.   HENT:      Head: Normocephalic and atraumatic.      Mouth/Throat:      Mouth: Mucous membranes are moist.      Pharynx: Oropharynx is clear. No oropharyngeal exudate or posterior oropharyngeal erythema.     Eyes:      Extraocular Movements: Extraocular movements intact.      Conjunctiva/sclera: Conjunctivae normal.      Pupils: Pupils are equal, round, and reactive to light.       Cardiovascular:      Rate and Rhythm: Normal rate and regular rhythm.      Pulses: Normal pulses.      Heart sounds: Normal heart sounds.   Pulmonary:      Effort: Pulmonary effort is normal. No tachypnea, accessory muscle usage or respiratory distress.      Breath sounds: Normal breath sounds. No stridor. No wheezing, rhonchi or rales.   Abdominal:      General: Abdomen is flat. There is no distension.      Palpations: Abdomen is soft.       Tenderness: There is abdominal tenderness in the left upper quadrant. There is no guarding or rebound.     Musculoskeletal:         General: Normal range of motion.      Cervical back: Normal range of motion and neck supple.     Skin:     General: Skin is warm and dry.      Capillary Refill: Capillary refill takes less than 2 seconds.      Coloration: Skin is not cyanotic or pale.     Neurological:      General: No focal deficit present.      Mental Status: She is alert.         Results Reviewed       Procedure Component Value Units Date/Time    HS Troponin 0hr (reflex protocol) [[id number]]  (Normal) Collected: 07/14/25 1727    Lab Status: Final result Specimen: Blood from Arm, Left Updated: 07/14/25 1802     hs TnI 0hr 5 ng/L     Comprehensive metabolic panel [[id number]]  (Abnormal) Collected: 07/14/25 1727    Lab Status: Final result Specimen: Blood from Arm, Left Updated: 07/14/25 1755     Sodium 140 mmol/L      Potassium 4.1 mmol/L      Chloride 106 mmol/L      CO2 26 mmol/L      ANION GAP 8 mmol/L      BUN 12 mg/dL      Creatinine 1.15 mg/dL      Glucose 132 mg/dL      Calcium 8.6 mg/dL      AST 11 U/L      ALT 6 U/L      Alkaline Phosphatase 46 U/L      Total Protein 6.1 g/dL      Albumin 3.7 g/dL      Total Bilirubin 0.30 mg/dL      eGFR 43 ml/min/1.73sq m     Narrative:      National Kidney Disease Foundation guidelines for Chronic Kidney Disease (CKD):     Stage 1 with normal or high GFR (GFR > 90 mL/min/1.73 square meters)    Stage 2 Mild CKD (GFR = 60-89 mL/min/1.73 square meters)    Stage 3A Moderate CKD (GFR = 45-59 mL/min/1.73 square meters)    Stage 3B Moderate CKD (GFR = 30-44 mL/min/1.73 square meters)    Stage 4 Severe CKD (GFR = 15-29 mL/min/1.73 square meters)    Stage 5 End Stage CKD (GFR <15 mL/min/1.73 square meters)  Note: GFR calculation is accurate only with a steady state creatinine    Lipase [[id number]]  (Normal) Collected: 07/14/25 1727    Lab Status: Final result Specimen: Blood from  Arm, Left Updated: 07/14/25 1755     Lipase 32 u/L     CBC and differential [[id number]]  (Abnormal) Collected: 07/14/25 1727    Lab Status: Final result Specimen: Blood from Arm, Left Updated: 07/14/25 1736     WBC 5.44 Thousand/uL      RBC 2.77 Million/uL      Hemoglobin 6.3 g/dL      Hematocrit 22.6 %      MCV 82 fL      MCH 22.7 pg      MCHC 27.9 g/dL      RDW 16.6 %      MPV 9.3 fL      Platelets 286 Thousands/uL      nRBC 0 /100 WBCs      Segmented % 75 %      Immature Grans % 0 %      Lymphocytes % 17 %      Monocytes % 7 %      Eosinophils Relative 1 %      Basophils Relative 0 %      Absolute Neutrophils 4.04 Thousands/µL      Absolute Immature Grans 0.02 Thousand/uL      Absolute Lymphocytes 0.93 Thousands/µL      Absolute Monocytes 0.38 Thousand/µL      Eosinophils Absolute 0.05 Thousand/µL      Basophils Absolute 0.02 Thousands/µL             CT abdomen pelvis wo contrast   Final Interpretation by Sj Kirkland MD (07/14 1913)      Luminal narrowing and possible wall thickening of a short segment of the ascending colon may be due to peristalsis. However, an underlying lesion cannot be excluded, and short interval follow-up CT abdomen/pelvis with oral and intravenous contrast is    recommended for further assessment. Review of medical and colonoscopy history is advised to determine whether colonoscopy is warranted at this time (instead of repeat CT). Given the absence of overlying inflammation, acute focal colitis is considered    less likely. Otherwise no potential acute abdominopelvic pathology.      Workstation performed: [id number]         XR chest 1 view portable    (Results Pending)       Procedures    ED Medication and Procedure Management   Prior to Admission Medications   Prescriptions Last Dose Informant Patient Reported? Taking?   HYDROcodone-acetaminophen (NORCO) 5-325 mg per tablet   Yes No   Sig: Take 1 tablet by mouth 3 (three) times a day as needed for pain   acetaminophen (TYLENOL) 500  mg tablet   Yes No   Sig: Take 500 mg by mouth every 6 (six) hours as needed for mild pain   atorvastatin (LIPITOR) 20 mg tablet   Yes No   Sig: Take 20 mg by mouth daily   ferrous sulfate 325 (65 Fe) mg tablet   No No   Sig: Take 1 tablet (325 mg total) by mouth daily with breakfast   lansoprazole (PREVACID) 30 mg capsule   Yes No   Sig: take 1 capsule by mouth twice a day (9 AM/ 9 PM)   levothyroxine 88 mcg tablet   No No   Sig: Take 1 tablet (88 mcg total) by mouth daily in the early morning   pantoprazole (PROTONIX) 40 mg tablet   No No   Sig: Take 1 tablet (40 mg total) by mouth daily   sucralfate (CARAFATE) 1 g/10 mL suspension   No No   Sig: Take 10 mL (1 g total) by mouth 2 (two) times a day   vitamin B-12 (VITAMIN B-12) 1,000 mcg tablet   Yes No   Sig: Take 1,000 mcg by mouth every morning      Facility-Administered Medications: None     Patient's Medications   Discharge Prescriptions    No medications on file     No discharge procedures on file.  ED SEPSIS DOCUMENTATION   Time reflects when diagnosis was documented in both MDM as applicable and the Disposition within this note       Time User Action Codes Description Comment    7/14/2025  7:42 PM Hilary Estrella [D64.9] Acute anemia     7/14/2025  7:42 PM Hilary Estrella [K44.9] Hiatal hernia                    [1]   Past Medical History:  Diagnosis Date    Acquired renal cyst of left kidney 08/09/2023    Arthritis     Breast cancer (HCC)     Cancer (HCC)     Diabetes mellitus (HCC)     Disease of thyroid gland     Generalized osteoarthritis 08/09/2023    Hiatal hernia     Hiatal hernia with GERD without esophagitis 04/16/2021    History of Helicobacter pylori infection 08/09/2023    Hypertension     Stage 3b chronic kidney disease (HCC) 08/09/2023    Type 2 diabetes mellitus with stage 3b chronic kidney disease, without long-term current use of insulin (HCC) 06/30/2021   [2]   Past Surgical History:  Procedure Laterality Date    ABDOMINAL  "SURGERY      states in maybe 40s-50s; \"my intestines were wrapped around each other\"    APPENDECTOMY      CHOLECYSTECTOMY      TONSILLECTOMY     [3] No family history on file.  [4]   Social History  Tobacco Use    Smoking status: Never    Smokeless tobacco: Never   Vaping Use    Vaping status: Never Used   Substance Use Topics    Alcohol use: Never    Drug use: Never        Hilary Estrella PA-C  07/14/25 1947    "

## 2025-07-14 NOTE — ED NOTES
Pt given blanket; call bell within reach and educated on use; no further needs at this time; will continue to monitor     Michaela Vázquez RN  07/14/25 3981

## 2025-07-14 NOTE — ASSESSMENT & PLAN NOTE
Iron panel from June 16, 2025 shows iron: 10, Transferrin: 282, Iron Sat: 3, TIBC: 395  Hgb: 6.3  Being transfused 1 unit packed red blood cells  Will transfuse Venofer 200 mg IV x 1  Start ferrous sulfate 325 mg p.o. twice daily with vitamin C  Patient is followed by Dr. Lawsno who visits her home.  She was supposed to get a blood transfusion and IV iron transfusion last month but could not as she did not have anybody to take her.  She did not want to drive by herself

## 2025-07-15 PROBLEM — R93.89 ABNORMAL CT SCAN: Status: ACTIVE | Noted: 2025-07-15

## 2025-07-15 LAB
ABO GROUP BLD BPU: NORMAL
ANION GAP SERPL CALCULATED.3IONS-SCNC: 8 MMOL/L (ref 4–13)
ATRIAL RATE: 82 BPM
BPU ID: NORMAL
BUN SERPL-MCNC: 11 MG/DL (ref 5–25)
CALCIUM SERPL-MCNC: 8.7 MG/DL (ref 8.4–10.2)
CHLORIDE SERPL-SCNC: 107 MMOL/L (ref 96–108)
CO2 SERPL-SCNC: 24 MMOL/L (ref 21–32)
CREAT SERPL-MCNC: 1.02 MG/DL (ref 0.6–1.3)
CROSSMATCH: NORMAL
ERYTHROCYTE [DISTWIDTH] IN BLOOD BY AUTOMATED COUNT: 16.5 % (ref 11.6–15.1)
GFR SERPL CREATININE-BSD FRML MDRD: 49 ML/MIN/1.73SQ M
GLUCOSE SERPL-MCNC: 99 MG/DL (ref 65–140)
HCT VFR BLD AUTO: 26.3 % (ref 34.8–46.1)
HCT VFR BLD AUTO: 28.8 % (ref 34.8–46.1)
HCT VFR BLD AUTO: 29.2 % (ref 34.8–46.1)
HGB BLD-MCNC: 7.6 G/DL (ref 11.5–15.4)
HGB BLD-MCNC: 8.1 G/DL (ref 11.5–15.4)
HGB BLD-MCNC: 8.5 G/DL (ref 11.5–15.4)
MCH RBC QN AUTO: 24.1 PG (ref 26.8–34.3)
MCHC RBC AUTO-ENTMCNC: 28.9 G/DL (ref 31.4–37.4)
MCV RBC AUTO: 84 FL (ref 82–98)
P AXIS: 47 DEGREES
PLATELET # BLD AUTO: 283 THOUSANDS/UL (ref 149–390)
PMV BLD AUTO: 9.8 FL (ref 8.9–12.7)
POTASSIUM SERPL-SCNC: 4.2 MMOL/L (ref 3.5–5.3)
PR INTERVAL: 148 MS
QRS AXIS: 57 DEGREES
QRSD INTERVAL: 76 MS
QT INTERVAL: 356 MS
QTC INTERVAL: 415 MS
RBC # BLD AUTO: 3.15 MILLION/UL (ref 3.81–5.12)
SODIUM SERPL-SCNC: 139 MMOL/L (ref 135–147)
T WAVE AXIS: 49 DEGREES
UNIT DISPENSE STATUS: NORMAL
UNIT PRODUCT CODE: NORMAL
UNIT PRODUCT VOLUME: 350 ML
UNIT RH: NORMAL
VENTRICULAR RATE: 82 BPM
WBC # BLD AUTO: 5.69 THOUSAND/UL (ref 4.31–10.16)

## 2025-07-15 PROCEDURE — 85014 HEMATOCRIT: CPT

## 2025-07-15 PROCEDURE — 80048 BASIC METABOLIC PNL TOTAL CA: CPT | Performed by: FAMILY MEDICINE

## 2025-07-15 PROCEDURE — 93010 ELECTROCARDIOGRAM REPORT: CPT | Performed by: INTERNAL MEDICINE

## 2025-07-15 PROCEDURE — 99232 SBSQ HOSP IP/OBS MODERATE 35: CPT

## 2025-07-15 PROCEDURE — 85014 HEMATOCRIT: CPT | Performed by: FAMILY MEDICINE

## 2025-07-15 PROCEDURE — 85027 COMPLETE CBC AUTOMATED: CPT | Performed by: FAMILY MEDICINE

## 2025-07-15 PROCEDURE — 85018 HEMOGLOBIN: CPT | Performed by: FAMILY MEDICINE

## 2025-07-15 PROCEDURE — 99222 1ST HOSP IP/OBS MODERATE 55: CPT | Performed by: INTERNAL MEDICINE

## 2025-07-15 PROCEDURE — 85018 HEMOGLOBIN: CPT

## 2025-07-15 RX ORDER — SUCRALFATE ORAL 1 G/10ML
1 SUSPENSION ORAL 3 TIMES DAILY PRN
Status: DISCONTINUED | OUTPATIENT
Start: 2025-07-15 | End: 2025-07-16 | Stop reason: HOSPADM

## 2025-07-15 RX ORDER — PANTOPRAZOLE SODIUM 40 MG/1
40 TABLET, DELAYED RELEASE ORAL
Status: DISCONTINUED | OUTPATIENT
Start: 2025-07-15 | End: 2025-07-16 | Stop reason: HOSPADM

## 2025-07-15 RX ADMIN — PANTOPRAZOLE SODIUM 40 MG: 40 TABLET, DELAYED RELEASE ORAL at 08:49

## 2025-07-15 RX ADMIN — HEPARIN SODIUM 5000 UNITS: 5000 INJECTION INTRAVENOUS; SUBCUTANEOUS at 13:50

## 2025-07-15 RX ADMIN — Medication 3 MG: at 00:16

## 2025-07-15 RX ADMIN — PANTOPRAZOLE SODIUM 40 MG: 40 TABLET, DELAYED RELEASE ORAL at 17:52

## 2025-07-15 RX ADMIN — FERROUS SULFATE TAB 325 MG (65 MG ELEMENTAL FE) 325 MG: 325 (65 FE) TAB at 17:52

## 2025-07-15 RX ADMIN — OXYCODONE HYDROCHLORIDE AND ACETAMINOPHEN 500 MG: 500 TABLET ORAL at 08:49

## 2025-07-15 RX ADMIN — HEPARIN SODIUM 5000 UNITS: 5000 INJECTION INTRAVENOUS; SUBCUTANEOUS at 05:46

## 2025-07-15 RX ADMIN — SUCRALFATE 1 G: 1 SUSPENSION ORAL at 17:52

## 2025-07-15 RX ADMIN — FERROUS SULFATE TAB 325 MG (65 MG ELEMENTAL FE) 325 MG: 325 (65 FE) TAB at 08:48

## 2025-07-15 RX ADMIN — CYANOCOBALAMIN TAB 1000 MCG 1000 MCG: 1000 TAB at 08:49

## 2025-07-15 RX ADMIN — OXYCODONE HYDROCHLORIDE AND ACETAMINOPHEN 500 MG: 500 TABLET ORAL at 17:52

## 2025-07-15 RX ADMIN — Medication 3 MG: at 21:48

## 2025-07-15 RX ADMIN — SODIUM CHLORIDE 200 MG: 9 INJECTION, SOLUTION INTRAVENOUS at 09:43

## 2025-07-15 RX ADMIN — OXYCODONE HYDROCHLORIDE 5 MG: 5 TABLET ORAL at 20:37

## 2025-07-15 RX ADMIN — LEVOTHYROXINE SODIUM 88 MCG: 88 TABLET ORAL at 05:46

## 2025-07-15 RX ADMIN — HEPARIN SODIUM 5000 UNITS: 5000 INJECTION INTRAVENOUS; SUBCUTANEOUS at 00:17

## 2025-07-15 NOTE — ASSESSMENT & PLAN NOTE
Patient cannot tell me some things correctly, has difficult time remembering medication names.  As an outpatient she will probably benefit from a memory test  Patient still drives

## 2025-07-15 NOTE — UTILIZATION REVIEW
Initial Clinical Review    Admission: Date/Time/Statement:   Admission Orders (From admission, onward)       Ordered        07/14/25 1943  INPATIENT ADMISSION  Once                          Orders Placed This Encounter   Procedures    INPATIENT ADMISSION     Standing Status:   Standing     Number of Occurrences:   1     Level of Care:   Med Surg [16]     Estimated length of stay:   More than 2 Midnights     Certification:   I certify that inpatient services are medically necessary for this patient for a duration of greater than two midnights. See H&P and MD Progress Notes for additional information about the patient's course of treatment.     ED Arrival Information       Expected   -    Arrival   7/14/2025 16:57    Acuity   Emergent              Means of arrival   Ambulance    Escorted by   [facility]  ([facility])    Service   Hospitalist    Admission type   Emergency              Arrival complaint   Chest Pain             Chief Complaint   Patient presents with    Chest Pain     Pt reports L sided chest pain that radiates across her chest.        Initial Presentation: 85 y.o. female presents to ed from home via ems on 7-14-25 for evaluation and treatment of L sided chest pain that radiates across her chest x4 days.  She needed outpatient blood transfusion and ir iron last month but has no transportation.  PMHX:  DM2, CKD, iron deficiency anemia    Clinical assessment significant for hypertension 164/ 88, pain 9/10, LUQ ttp.  HB 6.3.  Imaging shows luminal narrowing and wall thickening of ascending colon ( cannot rule out underlying lesion / focal colitis less likely), large hiatal hernia.   Initially treated with iv .9% ns 1L bolus, iv acetaminophen, 1 U prbc.  Admit to inpatient med surg for iron deficiency anemia.   Plan includes: consult GI, iv venofer, start ferrous sulfate with vitamin C bid.      Anticipated Length of Stay/Certification Statement:   Patient will be admitted on an  inpatient basis with an anticipated length of stay of greater than 2 midnights secondary to iron deficiency anemia.     Date: 7-15-25    Day 2: inpatient med surg  Certification Statement: The patient will continue to require additional inpatient hospital stay due to anemia  Discharge Plan: Anticipate discharge in 24-48 hrs to discharge location to be determined pending rehab evaluations.    Pain 10/10 BLLE knees and back.  Treated with oxycodone 5 mg x 1. HB 7.6 s/p 1U prbc and iv venofer x1.  Now HB 8.1.  GI consult:  Patient is refusing EGD.  Plan to continue iron infusion.  Monitor HH.  Patient on clear liquid diet for potential of colonoscopy, which is also currently being declined by patient.  Continue protonix po bid.   Will reconsider colonoscopy if there is evidence of active bleeding or if pt changes her mind.      Date: 7-16-25    Day 3: Has surpassed a 2nd midnight with active treatments and services to monitor for GI bleed, anemia.     Afebrile, VSS.   HB 8.2.  Back pain improved.  Cognitive decline : age related.  Alert and oriented x 3.  GI signing off.  PT evaluation:  minimal resource intensity to address functional deficits in strengthening, endurance, gait.  Referred to home health care.      Discharged to home       ED Treatment-Medication Administration from 07/14/2025 1657 to 07/14/2025 2041         Date/Time Order Dose Route Action     07/14/2025 1728 sodium chloride 0.9 % bolus 1,000 mL 1,000 mL Intravenous New Bag     07/14/2025 1728 acetaminophen (Ofirmev) injection 1,000 mg 1,000 mg Intravenous New Bag       Scheduled Medications:    ascorbic acid, 500 mg, Oral, BID  vitamin B-12, 1,000 mcg, Oral, QAM  ferrous sulfate, 325 mg, Oral, BID With Meals  heparin (porcine), 5,000 Units, Subcutaneous, Q8H MARIA EUGENIA  levothyroxine, 88 mcg, Oral, Early Morning  melatonin, 3 mg, Oral, HS  pantoprazole, 40 mg, Oral, BID AC      Continuous IV Infusions:     PRN Meds:  acetaminophen, 650 mg, Oral, Q6H  PRN  oxyCODONE, 5 mg, Oral, Q6H PRN      ED Triage Vitals [07/14/25 1706]   Temperature Pulse Respirations Blood Pressure SpO2 Pain Score   98.6 °F (37 °C) 80 18 (!) 176/81 98 % 9     Weight (last 2 days)       Date/Time Weight    07/14/25 20:56:41 63.8 (140.6)    07/14/25 1930 69.8 (153.88)            Vital Signs (last 3 days)       Date/Time Temp Pulse Resp BP MAP (mmHg) SpO2 O2 Device Helm Coma Scale Score Pain    07/15/25 0900 -- -- -- -- -- -- -- -- No Pain    07/15/25 07:41:54 97.7 °F (36.5 °C) 76 16 173/80 111 99 % -- -- --    07/14/25 23:09:47 97.1 °F (36.2 °C) 73 -- 127/75 92 97 % -- -- --    07/14/25 2230 98.3 °F (36.8 °C) 70 18 119/75 -- 98 % None (Room air) -- --    07/14/25 22:28:22 98.3 °F (36.8 °C) 76 -- 119/75 90 96 % -- -- --    07/14/25 2101 97.4 °F (36.3 °C) 74 -- 123/82 96 98 % None (Room air) -- --    07/14/25 20:56:41 97.1 °F (36.2 °C) 77 20 123/82 96 98 % None (Room air) -- --    07/14/25 2053 -- -- -- -- -- -- -- 15 No Pain    07/14/25 2009 98.3 °F (36.8 °C) 82 22 181/85 -- 97 % None (Room air) -- --    07/14/25 1954 98.2 °F (36.8 °C) 81 21 185/85 -- 99 % None (Room air) -- --    07/14/25 1949 98.1 °F (36.7 °C) 87 20 174/83 -- 98 % None (Room air) -- --    07/14/25 1944 98.2 °F (36.8 °C) 78 21 184/88 -- 98 % None (Room air) -- --    07/14/25 1930 98.1 °F (36.7 °C) 79 20 179/84 121 98 % None (Room air) -- 4    07/14/25 1900 -- 72 22 149/87 110 97 % None (Room air) -- 5    07/14/25 1830 -- 74 19 195/87 125 97 % None (Room air) -- 9    07/14/25 1741 -- -- -- -- -- -- None (Room air) -- --    07/14/25 1740 -- -- -- -- -- -- -- 15 9    07/14/25 1706 98.6 °F (37 °C) 80 18 176/81 116 98 % None (Room air) -- 9           Pertinent Labs/Diagnostic Test Results:   Radiology:  CT abdomen pelvis wo contrast   Final (07/14 1913)      Luminal narrowing and possible wall thickening of a short segment of the ascending colon may be due to peristalsis. However, an underlying lesion cannot be excluded, and  short interval follow-up CT abdomen/pelvis with oral and intravenous contrast is recommended for further assessment. Review of medical and colonoscopy history is advised to determine whether colonoscopy is warranted at this time (instead of repeat CT). Given the absence of overlying inflammation, acute focal colitis is considered ess likely. Otherwise no potential acute abdominopelvic pathology.         XR chest 1 view portable   Final (07/15 0818)      No acute cardiopulmonary disease.   Large hiatal hernia.          Results from last 7 days   Lab Units 07/15/25  0436 07/15/25  0015 07/14/25 1727   WBC Thousand/uL 5.69  --  5.44   HEMOGLOBIN g/dL 7.6* 8.5* 6.3*   HEMATOCRIT % 26.3* 29.2* 22.6*   PLATELETS Thousands/uL 283  --  286   TOTAL NEUT ABS Thousands/µL  --   --  4.04         Results from last 7 days   Lab Units 07/15/25  0436 07/14/25  1727 07/14/25  1110   SODIUM mmol/L 139 140 140   POTASSIUM mmol/L 4.2 4.1 4.2   CHLORIDE mmol/L 107 106 104   CO2 mmol/L 24 26 25   ANION GAP mmol/L 8 8 11   BUN mg/dL 11 12 12   CREATININE mg/dL 1.02 1.15 1.22*   EGFR ml/min/1.73sq m 49 43 43*   CALCIUM mg/dL 8.7 8.6 9     Results from last 7 days   Lab Units 07/14/25 1727 07/14/25  1110   AST U/L 11* 11   ALT U/L 6* 7   ALK PHOS U/L 46 50   TOTAL PROTEIN g/dL 6.1* 6.6   ALBUMIN g/dL 3.7 4   TOTAL BILIRUBIN mg/dL 0.30 0.4         Results from last 7 days   Lab Units 07/15/25  0436 07/14/25  1727 07/14/25  1110   GLUCOSE RANDOM mg/dL 99 132 179*           Results from last 7 days   Lab Units 07/14/25  1727   HS TNI 0HR ng/L 5     Results from last 7 days   Lab Units 07/14/25  1110   IRON SATURATION % 6*   IRON ug/dL 22*   TIBC ug/dL 382     Results from last 7 days   Lab Units 07/14/25  1110   TRANSFERRIN mg/dL 273           Results from last 7 days   Lab Units 07/14/25  1727   LIPASE u/L 32       Past Medical History:  Diagnosis Date    Acquired renal cyst of left kidney 08/09/2023    Arthritis     Breast cancer (HCC)      Cancer (HCC)     Diabetes mellitus (HCC)     Disease of thyroid gland     Generalized osteoarthritis 08/09/2023    Hiatal hernia     Hiatal hernia with GERD without esophagitis 04/16/2021    History of Helicobacter pylori infection 08/09/2023    Hypertension     Stage 3b chronic kidney disease (HCC) 08/09/2023    Type 2 diabetes mellitus with stage 3b chronic kidney disease, without long-term current use of insulin (HCC) 06/30/2021     Present on Admission:     Acquired hypothyroidism   MOHINDER (iron deficiency anemia)   Generalized osteoarthritis   Hiatal hernia with GERD without esophagitis   Age-related cognitive decline      Admitting Diagnosis:     Hiatal hernia [K44.9]  Chest pain [R07.9]  Acute anemia [D64.9]    Age/Sex: 85 y.o. female    Network Utilization Review Department  ATTENTION: Please call with any questions or concerns to [phone] and carefully listen to the prompts so that you are directed to the right person. All voicemails are confidential.   For Discharge needs, contact Care Management DC Support Team at [phone] opt. 2  Send all requests for admission clinical reviews, approved or denied determinations and any other requests to dedicated fax number below belonging to the campus where the patient is receiving treatment. List of dedicated fax numbers for the Facilities:  FACILITY NAME UR FAX NUMBER   ADMISSION DENIALS (Administrative/Medical Necessity) [phone]   DISCHARGE SUPPORT TEAM (NETWORK) [phone]   PARENT CHILD HEALTH (Maternity/NICU/Pediatrics) [phone]   [facility] [phone]   [facility] [phone]   [facility] [phone]   General acute hospital [phone]   [facility] [phone]   [facility] [phone]   [facility] [phone]   [facility]  [phone]   [facility] [phone]   [facility] [phone]   [facility] [phone]   [facility] [phone]

## 2025-07-15 NOTE — PLAN OF CARE
Problem: PAIN - ADULT  Goal: Verbalizes/displays adequate comfort level or baseline comfort level  Description: Interventions:  - Encourage patient to monitor pain and request assistance  - Assess pain using appropriate pain scale  - Administer analgesics as ordered based on type and severity of pain and evaluate response  - Implement non-pharmacological measures as appropriate and evaluate response  - Consider cultural and social influences on pain and pain management  - Notify physician/advanced practitioner if interventions unsuccessful or patient reports new pain  - Educate patient/family on pain management process including their role and importance of  reporting pain   - Provide non-pharmacologic/complimentary pain relief interventions  Outcome: Progressing     Problem: INFECTION - ADULT  Goal: Absence or prevention of progression during hospitalization  Description: INTERVENTIONS:  - Assess and monitor for signs and symptoms of infection  - Monitor lab/diagnostic results  - Monitor all insertion sites, i.e. indwelling lines, tubes, and drains  - Monitor endotracheal if appropriate and nasal secretions for changes in amount and color  - Colver appropriate cooling/warming therapies per order  - Administer medications as ordered  - Instruct and encourage patient and family to use good hand hygiene technique  - Identify and instruct in appropriate isolation precautions for identified infection/condition  Outcome: Progressing  Goal: Absence of fever/infection during neutropenic period  Description: INTERVENTIONS:  - Monitor WBC  - Perform strict hand hygiene  - Limit to healthy visitors only  - No plants, dried, fresh or silk flowers with tran in patient room  Outcome: Progressing     Problem: SAFETY ADULT  Goal: Patient will remain free of falls  Description: INTERVENTIONS:  - Educate patient/family on patient safety including physical limitations  - Instruct patient to call for assistance with activity   -  Consider consulting OT/PT to assist with strengthening/mobility based on AM PAC & JH-HLM score  - Consult OT/PT to assist with strengthening/mobility   - Keep Call bell within reach  - Keep bed low and locked with side rails adjusted as appropriate  - Keep care items and personal belongings within reach  - Initiate and maintain comfort rounds  - Make Fall Risk Sign visible to staff  - Offer Toileting every 2 Hours, in advance of need  - Initiate/Maintain fall alarm  - Obtain necessary fall risk management equipment: non-skid footwear  - Apply yellow socks and bracelet for high fall risk patients  - Consider moving patient to room near nurses station  Outcome: Progressing  Goal: Maintain or return to baseline ADL function  Description: INTERVENTIONS:  -  Assess patient's ability to carry out ADLs; assess patient's baseline for ADL function and identify physical deficits which impact ability to perform ADLs (bathing, care of mouth/teeth, toileting, grooming, dressing, etc.)  - Assess/evaluate cause of self-care deficits   - Assess range of motion  - Assess patient's mobility; develop plan if impaired  - Assess patient's need for assistive devices and provide as appropriate  - Encourage maximum independence but intervene and supervise when necessary  - Involve family in performance of ADLs  - Assess for home care needs following discharge   - Consider OT consult to assist with ADL evaluation and planning for discharge  - Provide patient education as appropriate  - Monitor functional capacity and physical performance, use of AM PAC & JH-HLM   - Monitor gait, balance and fatigue with ambulation    Outcome: Progressing  Goal: Maintains/Returns to pre admission functional level  Description: INTERVENTIONS:  - Perform AM-PAC 6 Click Basic Mobility/ Daily Activity assessment daily.  - Set and communicate daily mobility goal to care team and patient/family/caregiver.   - Collaborate with rehabilitation services on mobility  goals if consulted  - Perform Range of Motion 3 times a day.  - Reposition patient every 2 hours.  - Dangle patient 3 times a day  - Stand patient 3 times a day  - Ambulate patient 3 times a day  - Out of bed to chair 3 times a day   - Out of bed for meals 3 times a day  - Out of bed for toileting  - Record patient progress and toleration of activity level   Outcome: Progressing     Problem: DISCHARGE PLANNING  Goal: Discharge to home or other facility with appropriate resources  Description: INTERVENTIONS:  - Identify barriers to discharge w/patient and caregiver  - Arrange for needed discharge resources and transportation as appropriate  - Identify discharge learning needs (meds, wound care, etc.)  - Arrange for interpretive services to assist at discharge as needed  - Refer to Case Management Department for coordinating discharge planning if the patient needs post-hospital services based on physician/advanced practitioner order or complex needs related to functional status, cognitive ability, or social support system  Outcome: Progressing     Problem: Knowledge Deficit  Goal: Patient/family/caregiver demonstrates understanding of disease process, treatment plan, medications, and discharge instructions  Description: Complete learning assessment and assess knowledge base.  Interventions:  - Provide teaching at level of understanding  - Provide teaching via preferred learning methods  Outcome: Progressing     Problem: METABOLIC, FLUID AND ELECTROLYTES - ADULT  Goal: Electrolytes maintained within normal limits  Description: INTERVENTIONS:  - Monitor labs and assess patient for signs and symptoms of electrolyte imbalances  - Administer electrolyte replacement as ordered  - Monitor response to electrolyte replacements, including repeat lab results as appropriate  - Instruct patient on fluid and nutrition as appropriate  Outcome: Progressing  Goal: Fluid balance maintained  Description: INTERVENTIONS:  - Monitor labs    - Monitor I/O and WT  - Instruct patient on fluid and nutrition as appropriate  - Assess for signs & symptoms of volume excess or deficit  Outcome: Progressing  Goal: Glucose maintained within target range  Description: INTERVENTIONS:  - Monitor Blood Glucose as ordered  - Assess for signs and symptoms of hyperglycemia and hypoglycemia  - Administer ordered medications to maintain glucose within target range  - Assess nutritional intake and initiate nutrition service referral as needed  Outcome: Progressing     Problem: HEMATOLOGIC - ADULT  Goal: Maintains hematologic stability  Description: INTERVENTIONS  - Assess for signs and symptoms of bleeding or hemorrhage  - Monitor labs  - Administer supportive blood products/factors as ordered and appropriate  Outcome: Progressing     Problem: MUSCULOSKELETAL - ADULT  Goal: Maintain or return mobility to safest level of function  Description: INTERVENTIONS:  - Assess patient's ability to carry out ADLs; assess patient's baseline for ADL function and identify physical deficits which impact ability to perform ADLs (bathing, care of mouth/teeth, toileting, grooming, dressing, etc.)  - Assess/evaluate cause of self-care deficits   - Assess range of motion  - Assess patient's mobility  - Assess patient's need for assistive devices and provide as appropriate  - Encourage maximum independence but intervene and supervise when necessary  - Involve family in performance of ADLs  - Assess for home care needs following discharge   - Consider OT consult to assist with ADL evaluation and planning for discharge  - Provide patient education as appropriate  Outcome: Progressing  Goal: Maintain proper alignment of affected body part  Description: INTERVENTIONS:  - Support, maintain and protect limb and body alignment  - Provide patient/ family with appropriate education  Outcome: Progressing

## 2025-07-15 NOTE — PROGRESS NOTES
"Progress Note - Hospitalist   Name: Charlee Botello 85 y.o. female I MRN: [id number]  Unit/Bed#: 403-01 I Date of Admission: 7/14/2025   Date of Service: 7/15/2025 I Hospital Day: 1    Assessment & Plan  MOHINDER (iron deficiency anemia)  Remains without overt bleeding  Hgb improved to 7.6 s/p 1 unit pRBC & Venofer 200mg x 1  Appreciate GI consult, patient is refusing endoscopic evaluation at this time  Follows with GI, Dr Lucia  Underwent EGD on 6/2025 for workup revealing hiatal hernia, gastritis. She was noted to also refuse colonoscopy at that time. Recommendation were to continue iron infusion which she reportedly missed   Monitor H&H. Transfuse as indicated  GI placed patient tentatively on CLD today  Agree would have to re-consider scopes if develops overt bleeding or worsening anemia   Abnormal CT scan  CT abd/pelvis \"Luminal narrowing and possible wall thickening of a short segment of the ascending colon may be due to peristalsis. However, an underlying lesion cannot be excluded, and short interval follow-up CT abdomen/pelvis with oral and intravenous contrast is recommended for further assessment. Review of medical and colonoscopy history is advised to determine whether colonoscopy is warranted at this time (instead of repeat CT).\"   Declining colonoscopy. Did discuss the same with patient's daughter    Acquired hypothyroidism  Continue levothyroxine  Generalized osteoarthritis  Takes as needed Norco at home  Hiatal hernia with GERD without esophagitis  Continue Protonix  Age-related cognitive decline  A&O x 4, but does does have some limitation in memory recollection     VTE Pharmacologic Prophylaxis:   SCDs    Mobility:   Basic Mobility Inpatient Raw Score: 22  -HLM Goal: 7: Walk 25 feet or more  JH-HLM Achieved: 3: Sit at edge of bed  JH-HLM Goal NOT achieved. Continue with multidisciplinary rounding and encourage appropriate mobility to improve upon JH-HLM goals.    Patient Centered Rounds: I performed " bedside rounds with nursing staff today.   Discussions with Specialists or Other Care Team Provider: case management, GI    Education and Discussions with Family / Patient: Updated  (daughter) via phone.    Current Length of Stay: 1 day(s)  Current Patient Status: Inpatient   Certification Statement: The patient will continue to require additional inpatient hospital stay due to anemia  Discharge Plan: Anticipate discharge in 24-48 hrs to discharge location to be determined pending rehab evaluations.    Code Status: Level 3 - DNAR and DNI    Subjective   Patient seen and examined. Feels much better after blood transfusion. No acute complaints     Objective :  Temp:  [97.1 °F (36.2 °C)-98.3 °F (36.8 °C)] 97.8 °F (36.6 °C)  HR:  [70-87] 74  BP: (119-195)/(75-88) 161/80  Resp:  [16-22] 16  SpO2:  [96 %-99 %] 97 %  O2 Device: None (Room air)    Body mass index is 25.72 kg/m².     Input and Output Summary (last 24 hours):     Intake/Output Summary (Last 24 hours) at 7/15/2025 1711  Last data filed at 7/15/2025 0951  Gross per 24 hour   Intake 1630 ml   Output 600 ml   Net 1030 ml       Physical Exam  Constitutional:       General: She is not in acute distress.  HENT:      Head: Normocephalic and atraumatic.     Cardiovascular:      Rate and Rhythm: Normal rate and regular rhythm.   Pulmonary:      Effort: Pulmonary effort is normal. No respiratory distress.      Breath sounds: Normal breath sounds.   Abdominal:      General: Abdomen is flat. There is no distension.      Palpations: Abdomen is soft.      Tenderness: There is no abdominal tenderness.     Skin:     General: Skin is warm and dry.     Neurological:      General: No focal deficit present.      Mental Status: She is alert and oriented to person, place, and time.           Lines/Drains:              Lab Results: I have reviewed the following results:   Results from last 7 days   Lab Units 07/15/25  0436 07/15/25  0015 07/14/25  1727   WBC  Thousand/uL 5.69  --  5.44   HEMOGLOBIN g/dL 7.6*   < > 6.3*   HEMATOCRIT % 26.3*   < > 22.6*   PLATELETS Thousands/uL 283  --  286   SEGS PCT %  --   --  75   LYMPHO PCT %  --   --  17   MONO PCT %  --   --  7   EOS PCT %  --   --  1    < > = values in this interval not displayed.     Results from last 7 days   Lab Units 07/15/25  0436 07/14/25  1727   SODIUM mmol/L 139 140   POTASSIUM mmol/L 4.2 4.1   CHLORIDE mmol/L 107 106   CO2 mmol/L 24 26   BUN mg/dL 11 12   CREATININE mg/dL 1.02 1.15   ANION GAP mmol/L 8 8   CALCIUM mg/dL 8.7 8.6   ALBUMIN g/dL  --  3.7   TOTAL BILIRUBIN mg/dL  --  0.30   ALK PHOS U/L  --  46   ALT U/L  --  6*   AST U/L  --  11*   GLUCOSE RANDOM mg/dL 99 132                       Recent Cultures (last 7 days):         Imaging Results Review: I reviewed radiology reports from this admission including: CT abdomen/pelvis.  Other Study Results Review: No additional pertinent studies reviewed.    Last 24 Hours Medication List:     Current Facility-Administered Medications:     acetaminophen (TYLENOL) tablet 650 mg, Q6H PRN    ascorbic acid (VITAMIN C) tablet 500 mg, BID    cyanocobalamin (VITAMIN B-12) tablet 1,000 mcg, QAM    ferrous sulfate tablet 325 mg, BID With Meals    heparin (porcine) subcutaneous injection 5,000 Units, Q8H MARIA EUGENIA    levothyroxine tablet 88 mcg, Early Morning    melatonin tablet 3 mg, HS    oxyCODONE (ROXICODONE) IR tablet 5 mg, Q6H PRN    pantoprazole (PROTONIX) EC tablet 40 mg, BID AC    sucralfate (CARAFATE) oral suspension (VANNESA/PEDS) 1 g, TID PRN    Administrative Statements   Today, Patient Was Seen By: Yolanda Zuniga PA-C      **Please Note: This note may have been constructed using a voice recognition system.**

## 2025-07-15 NOTE — H&P
H&P - Hospitalist   Name: Charlee Botello 85 y.o. female I MRN: [id number]  Unit/Bed#: 403-01 I Date of Admission: 7/14/2025   Date of Service: 7/14/2025 I Hospital Day: 0     Assessment & Plan  MOHINDER (iron deficiency anemia)  Iron panel from June 16, 2025 shows iron: 10, Transferrin: 282, Iron Sat: 3, TIBC: 395  Hgb: 6.3  Being transfused 1 unit packed red blood cells  Will transfuse Venofer 200 mg IV x 1  Start ferrous sulfate 325 mg p.o. twice daily with vitamin C  Patient is followed by Dr. Lawson who visits her home.  She was supposed to get a blood transfusion and IV iron transfusion last month but could not as she did not have anybody to take her.  She did not want to drive by herself  Acquired hypothyroidism  Continue levothyroxine  Generalized osteoarthritis  Takes as needed Norco at home  Hiatal hernia with GERD without esophagitis  Continue Protonix  Age-related cognitive decline  Patient cannot tell me some things correctly, has difficult time remembering medication names.  As an outpatient she will probably benefit from a memory test  Patient still drives        Disposition  #1  1 unit of packed red blood cells being transfused  #2  Venofer 200 mg IV x 1 tomorrow  #3  Start ferrous sulfate 325 mg p.o. twice daily with vitamin C  #4  Repeat labs in the morning  #5 Consult GI       VTE Pharmacologic Prophylaxis:   Moderate Risk (Score 3-4) - Pharmacological DVT Prophylaxis Ordered: heparin.  Code Status: Level 3 - DNAR and DNI       Anticipated Length of Stay: Patient will be admitted on an inpatient basis with an anticipated length of stay of greater than 2 midnights secondary to iron deficiency anemia.    History of Present Illness   Chief Complaint: Dyspnea on exertion    Charlee Botello is a 85 y.o. female with a PMH of iron deficiency anemia, acid reflux, hiatal hernia, hypothyroidism who presents with left-sided chest discomfort and dyspnea on exertion.  She tells me she gets out of breath when doing  activities.  Has to sit down.  Patient is now followed by Dr. Lawson who follows her at home and visits every month.  She got blood work last month and was supposed to get a blood transfusion and IV iron transfusion.  She tells me that she cannot drive, but she does not want to drive to get the blood or iron transfusion without somebody else driving her back to her house.  The patient does have children in her life but they are not involved in her health care.  The patient has not been taking oral iron supplementation.  She states she is always been anemic and has had previous blood transfusions.     Review of Systems   Constitutional:  Positive for fatigue.   Respiratory:  Positive for chest tightness.    Cardiovascular: Negative.    Gastrointestinal: Negative.    Genitourinary: Negative.    Musculoskeletal:  Positive for arthralgias.       Historical Information   Past Medical History[1]  Past Surgical History[2]  Social History[3]  E-Cigarette/Vaping    E-Cigarette Use Never User      E-Cigarette/Vaping Substances    Nicotine No     THC No     CBD No      Family history non-contributory  Social History:  Marital Status:    Occupation:   Patient Pre-hospital Living Situation: Home  Patient Pre-hospital Level of Mobility: walks  Patient Pre-hospital Diet Restrictions: None    Meds/Allergies   I have reviewed home medications with patient personally.  Prior to Admission medications    Medication Sig Start Date End Date Taking? Authorizing Provider   acetaminophen (TYLENOL) 500 mg tablet Take 500 mg by mouth every 6 (six) hours as needed for mild pain   Yes Historical Provider, MD   levothyroxine 88 mcg tablet Take 1 tablet (88 mcg total) by mouth daily in the early morning 9/11/24 7/14/25 Yes Arnold Puente DO   pantoprazole (PROTONIX) 40 mg tablet Take 1 tablet (40 mg total) by mouth daily 9/11/24 7/14/25 Yes Arnold Puente DO   vitamin B-12 (VITAMIN B-12) 1,000 mcg tablet Take 1,000 mcg by mouth every morning  "6/30/23  Yes Historical Provider, MD   atorvastatin (LIPITOR) 20 mg tablet Take 20 mg by mouth daily  Patient not taking: Reported on 7/14/2025    Historical Provider, MD   ferrous sulfate 325 (65 Fe) mg tablet Take 1 tablet (325 mg total) by mouth daily with breakfast 1/20/24 2/19/24  Yolanda Zuniga PA-C   HYDROcodone-acetaminophen (NORCO) 5-325 mg per tablet Take 1 tablet by mouth 3 (three) times a day as needed for pain  Patient not taking: Reported on 7/14/2025 10/20/22   Historical Provider, MD   lansoprazole (PREVACID) 30 mg capsule take 1 capsule by mouth twice a day (9 AM/ 9 PM)  Patient not taking: Reported on 7/14/2025 6/13/23   Historical Provider, MD   sucralfate (CARAFATE) 1 g/10 mL suspension Take 10 mL (1 g total) by mouth 2 (two) times a day  Patient not taking: Reported on 7/14/2025 9/11/24   Arnold Puente,      Allergies   Allergen Reactions    Penicillins Other (See Comments), Shortness Of Breath and Throat Swelling     \"dizzy, nauseous\"    Dye [Iodinated Contrast Media] Nausea Only    Propoxyphene Nausea Only    Latex Rash       Objective :  Temp:  [97.1 °F (36.2 °C)-98.6 °F (37 °C)] 97.1 °F (36.2 °C)  HR:  [70-87] 73  BP: (119-195)/(75-88) 127/75  Resp:  [18-22] 18  SpO2:  [96 %-99 %] 97 %  O2 Device: None (Room air)    Physical Exam  Constitutional:       Appearance: Normal appearance. She is normal weight.   HENT:      Head: Normocephalic and atraumatic.      Nose: Nose normal.      Mouth/Throat:      Mouth: Mucous membranes are moist.      Pharynx: Oropharynx is clear.     Eyes:      Extraocular Movements: Extraocular movements intact.      Conjunctiva/sclera: Conjunctivae normal.       Cardiovascular:      Rate and Rhythm: Normal rate and regular rhythm.      Pulses: Normal pulses.      Heart sounds: Normal heart sounds.   Pulmonary:      Comments: Decreased breath sounds  Abdominal:      General: There is no distension.      Palpations: Abdomen is soft.      Tenderness: There is " no abdominal tenderness. There is no guarding.     Musculoskeletal:         General: No swelling.     Skin:     General: Skin is warm and dry.     Neurological:      General: No focal deficit present.      Mental Status: She is alert and oriented to person, place, and time. Mental status is at baseline.     Psychiatric:         Mood and Affect: Mood normal.         Behavior: Behavior normal.         Thought Content: Thought content normal.                  Lab Results: I have reviewed the following results:  Results from last 7 days   Lab Units 07/14/25  1727   WBC Thousand/uL 5.44   HEMOGLOBIN g/dL 6.3*   HEMATOCRIT % 22.6*   PLATELETS Thousands/uL 286   SEGS PCT % 75   LYMPHO PCT % 17   MONO PCT % 7   EOS PCT % 1     Results from last 7 days   Lab Units 07/14/25  1727   SODIUM mmol/L 140   POTASSIUM mmol/L 4.1   CHLORIDE mmol/L 106   CO2 mmol/L 26   BUN mg/dL 12   CREATININE mg/dL 1.15   ANION GAP mmol/L 8   CALCIUM mg/dL 8.6   ALBUMIN g/dL 3.7   TOTAL BILIRUBIN mg/dL 0.30   ALK PHOS U/L 46   ALT U/L 6*   AST U/L 11*   GLUCOSE RANDOM mg/dL 132             Lab Results   Component Value Date    HGBA1C 6.5 (H) 09/13/2024    HGBA1C 6.0 (H) 10/19/2023    HGBA1C 6.2 (H) 08/09/2023           Imaging Results Review: I reviewed radiology reports from this admission including: CT abdomen/pelvis.  Other Study Results Review: No additional pertinent studies reviewed.    Administrative Statements     Medical decision making: Moderate  Diagnosis addressed: Exacerbation of her anemia  Data:   Reviewed  CBC, CMP, lipase, troponin  Ordered CBC, BMP,  Reviewed external notes from previous discharge summary  Discussion of management with ER provider: Being transfused 1 unit of blood.           Risk:  Prescription drug management: Transfusion of blood and IV iron  Discussion for hospitalization with ER provider: Acute anemia  Drug therapy requiring intensive monitoring: IV iron and blood  Initiation of parenteral controlled  "substances: Oxycodone      ** Please Note: This note has been constructed using a voice recognition system. **         [1]   Past Medical History:  Diagnosis Date    Acquired renal cyst of left kidney 08/09/2023    Arthritis     Breast cancer (HCC)     Cancer (HCC)     Diabetes mellitus (HCC)     Disease of thyroid gland     Generalized osteoarthritis 08/09/2023    Hiatal hernia     Hiatal hernia with GERD without esophagitis 04/16/2021    History of Helicobacter pylori infection 08/09/2023    Hypertension     Stage 3b chronic kidney disease (HCC) 08/09/2023    Type 2 diabetes mellitus with stage 3b chronic kidney disease, without long-term current use of insulin (HCC) 06/30/2021   [2]   Past Surgical History:  Procedure Laterality Date    ABDOMINAL SURGERY      states in maybe 40s-50s; \"my intestines were wrapped around each other\"    APPENDECTOMY      CHOLECYSTECTOMY      TONSILLECTOMY     [3]   Social History  Tobacco Use    Smoking status: Never    Smokeless tobacco: Never   Vaping Use    Vaping status: Never Used   Substance and Sexual Activity    Alcohol use: Never    Drug use: Never    Sexual activity: Not Currently     "

## 2025-07-15 NOTE — CONSULTS
Consultation - Gastroenterology   Name: Charlee Botello 85 y.o. female I MRN: [id number]  Unit/Bed#: 403-01 I Date of Admission: 7/14/2025   Date of Service: 7/15/2025 I Hospital Day: 1   Inpatient consult to gastroenterology  Consult performed by: Martha Desai PA-C  Consult ordered by: Bashir Reynoso MD        Physician Requesting Evaluation: Conrad Martin Counts, DO   Reason for Evaluation / Principal Problem: abnormal CT scan, cannot rule out lesion in ascending colon, acute anemia, MOHINDER     Assessment & Plan  MOHINDER (iron deficiency anemia)  Maintain IV access, monitor Hb, transfuse per protocol or for hemodynamic instability.   Pt received 1U PRBC and venofer.   No overt GI bleeding. Rectal exam with light brown stool on finger.   Last EGD in 6/2025 hiatal hernia, gastritis, notes indicated at that time that pt declines colonoscopy.   I once again reviewed the role of bidirectional endoscopic procedures given her progressive anemia and the potential abnormalities on CT scan (luminal narrowing and possible wall thickening of the short segment of the ascending colon).   Pt wishes to avoid invasive procedures if possible.  I did review with patient that delaying endoscopic evaluation may also potentially delay significant/pathologic diagnosis including malignancy.      Tentatively I placed her on a clear liquid diet until Dr. Domingo is able to see the patient.  I think that if she develops progressive anemia, transfusion dependent anemia during this hospitalization, or overt GI bleeding, the discussion regarding endoscopic evaluation would need to be reconsidered.  Hiatal hernia with GERD without esophagitis  Noted, history of such.  Continue PPI twice daily.  Recommend diet and lifestyle modifications for GERD.  This includes avoiding spicy, saucy, greasy/oily foods, citrus, EtOH, NSAIDs, tobacco.  Avoid eating within 2 to 3 hours of bed.  Elevating height of bed 6 inches on blocks may be beneficial.        Acquired hypothyroidism  Continue levothyroxine per primary team.  Generalized osteoarthritis  Management per primary team.  Age-related cognitive decline  Patient was alert and oriented x 3 at time of my interview however she did have some trouble with historical details.  Defer to primary team.  I have discussed the above management plan in detail with the primary service.   Gastroenterology service will follow.    History of Present Illness   HPI:  Charlee Botello is a 85 y.o. female who presented to hospital for abdominal pain. Pmhx sig for GERD, hiatal hernia, hypothyroidism, DM2, HLD, CKD3.     Serologies at time of hospitalization with Hb 6.3 (down from 7.3 in 06/2025), MCV 82, Plt 286, BUN 12, Cr 1.15, AST 11, ALT 6, ALP 46, albumin 3.7, t bili 0.30, lipase 32. CT at time of admission with luminal narrowing and possible wall thickening of a short segment of the ascending colon may be due to peristalsis.     She was transfused 1U PRBC with repeat Hb at 7.6.    At bedside evaluation on 07/15/25, pt shares that she has dealt with anemia for years. Tells me she had EGD recently (in chart from 06/2025) and colon within past few years (I cannot locate this). Has some left sided rib and chest pain. At times has some left sided abdominal pain, this typically improves following defecation. No BRBPR or melena. No nocturnal BM.     Endoscopic history:   EGD: 08/2023: 7 cm type I hiatal hernia with Kai lesions present; gastritis   A. Stomach, Biopsy: Antral and oxyntic-type gastric mucosa with chronic active gastritis. No Helicobacter organisms identified on H&E or immunostain. An AE1/AE3 immunostain highlights reactive gastric epithelium. Negative for intestinal metaplasia, dysplasia, and malignancy.    Note: While no H. pylori microorganisms are identified on immunohistochemical stain, the overall features are suspicious for helicobacter infection. H. Pylori may not be detected due to recent antibiotic exposure  or use of proton pump inhibitors (PPI), as well as presence of intestinal metaplasia. Fecal antigen testing may be considered if clinically indicated.  EGD: 06/2025: 2 cm hiatal hernia, gastritis, normal duodenum; h pylori testing negative     Review of Systems  Per HPI    Historical Information   Past Medical History[1]  Past Surgical History[2]  Social History[3]  E-Cigarette/Vaping    E-Cigarette Use Never User      E-Cigarette/Vaping Substances    Nicotine No     THC No     CBD No      Family History[4]    Objective :  Temp:  [97.1 °F (36.2 °C)-98.6 °F (37 °C)] 97.1 °F (36.2 °C)  HR:  [70-87] 73  BP: (119-195)/(75-88) 127/75  Resp:  [18-22] 18  SpO2:  [96 %-99 %] 97 %  O2 Device: None (Room air)    Physical Exam  Vitals and nursing note reviewed. Exam conducted with a chaperone present.   Constitutional:       General: She is not in acute distress.     Appearance: She is well-developed.   HENT:      Head: Normocephalic and atraumatic.     Eyes:      General: No scleral icterus.     Conjunctiva/sclera: Conjunctivae normal.       Cardiovascular:      Rate and Rhythm: Normal rate.      Heart sounds: No murmur heard.  Pulmonary:      Effort: Pulmonary effort is normal. No respiratory distress.   Abdominal:      General: There is no distension.      Palpations: Abdomen is soft.      Tenderness: There is no abdominal tenderness. There is no guarding or rebound.   Genitourinary:     Comments: Light brown stool on finger    Skin:     General: Skin is warm and dry.      Coloration: Skin is not jaundiced.     Neurological:      General: No focal deficit present.      Mental Status: She is alert.     Psychiatric:         Mood and Affect: Mood normal.         Behavior: Behavior normal.         Lab Results: I have reviewed the following results:CBC/BMP:   .     07/15/25  0436   WBC 5.69   HGB 7.6*   HCT 26.3*      SODIUM 139   K 4.2      CO2 24   BUN 11   CREATININE 1.02   GLUC 99    , Creatinine Clearance:  "Estimated Creatinine Clearance: 34.8 mL/min (by C-G formula based on SCr of 1.02 mg/dL)., LFTs:   .     07/14/25  1727   AST 11*   ALT 6*   ALB 3.7   TBILI 0.30   ALKPHOS 46      Imaging Results Review: I reviewed radiology reports from this admission including: CT abdomen/pelvis.  Other Study Results Review: Pathology reports reviewed.    **Please note:  Dictation voice to text software may have been used in the creation of this record.  Occasional wrong word or “sound alike” substitutions may have occurred due to the inherent limitations of voice recognition software.  Read the chart carefully and recognize, using context, where substitutions have occurred.**         [1]   Past Medical History:  Diagnosis Date    Acquired renal cyst of left kidney 08/09/2023    Arthritis     Breast cancer (HCC)     Cancer (HCC)     Diabetes mellitus (HCC)     Disease of thyroid gland     Generalized osteoarthritis 08/09/2023    Hiatal hernia     Hiatal hernia with GERD without esophagitis 04/16/2021    History of Helicobacter pylori infection 08/09/2023    Hypertension     Stage 3b chronic kidney disease (HCC) 08/09/2023    Type 2 diabetes mellitus with stage 3b chronic kidney disease, without long-term current use of insulin (HCC) 06/30/2021   [2]   Past Surgical History:  Procedure Laterality Date    ABDOMINAL SURGERY      states in maybe 40s-50s; \"my intestines were wrapped around each other\"    APPENDECTOMY      CHOLECYSTECTOMY      TONSILLECTOMY     [3]   Social History  Tobacco Use    Smoking status: Never    Smokeless tobacco: Never   Vaping Use    Vaping status: Never Used   Substance and Sexual Activity    Alcohol use: Never    Drug use: Never    Sexual activity: Not Currently   [4] No family history on file.    "

## 2025-07-16 VITALS
HEART RATE: 72 BPM | DIASTOLIC BLOOD PRESSURE: 68 MMHG | BODY MASS INDEX: 25.88 KG/M2 | RESPIRATION RATE: 17 BRPM | OXYGEN SATURATION: 94 % | HEIGHT: 62 IN | WEIGHT: 140.6 LBS | TEMPERATURE: 98.5 F | SYSTOLIC BLOOD PRESSURE: 140 MMHG

## 2025-07-16 LAB
ALBUMIN SERPL BCG-MCNC: 3.9 G/DL (ref 3.5–5)
ALP SERPL-CCNC: 49 U/L (ref 34–104)
ALT SERPL W P-5'-P-CCNC: 6 U/L (ref 7–52)
ANION GAP SERPL CALCULATED.3IONS-SCNC: 7 MMOL/L (ref 4–13)
AST SERPL W P-5'-P-CCNC: 12 U/L (ref 13–39)
BASOPHILS # BLD AUTO: 0.02 THOUSANDS/ÂΜL (ref 0–0.1)
BASOPHILS NFR BLD AUTO: 0 % (ref 0–1)
BILIRUB SERPL-MCNC: 0.85 MG/DL (ref 0.2–1)
BUN SERPL-MCNC: 9 MG/DL (ref 5–25)
CALCIUM SERPL-MCNC: 9.2 MG/DL (ref 8.4–10.2)
CHLORIDE SERPL-SCNC: 105 MMOL/L (ref 96–108)
CO2 SERPL-SCNC: 27 MMOL/L (ref 21–32)
CREAT SERPL-MCNC: 1.07 MG/DL (ref 0.6–1.3)
EOSINOPHIL # BLD AUTO: 0.07 THOUSAND/ÂΜL (ref 0–0.61)
EOSINOPHIL NFR BLD AUTO: 1 % (ref 0–6)
ERYTHROCYTE [DISTWIDTH] IN BLOOD BY AUTOMATED COUNT: 16.9 % (ref 11.6–15.1)
GFR SERPL CREATININE-BSD FRML MDRD: 47 ML/MIN/1.73SQ M
GLUCOSE SERPL-MCNC: 94 MG/DL (ref 65–140)
HCT VFR BLD AUTO: 28.6 % (ref 34.8–46.1)
HGB BLD-MCNC: 8.2 G/DL (ref 11.5–15.4)
IMM GRANULOCYTES # BLD AUTO: 0.03 THOUSAND/UL (ref 0–0.2)
IMM GRANULOCYTES NFR BLD AUTO: 1 % (ref 0–2)
LYMPHOCYTES # BLD AUTO: 1.01 THOUSANDS/ÂΜL (ref 0.6–4.47)
LYMPHOCYTES NFR BLD AUTO: 16 % (ref 14–44)
MCH RBC QN AUTO: 23.8 PG (ref 26.8–34.3)
MCHC RBC AUTO-ENTMCNC: 28.7 G/DL (ref 31.4–37.4)
MCV RBC AUTO: 83 FL (ref 82–98)
MONOCYTES # BLD AUTO: 0.33 THOUSAND/ÂΜL (ref 0.17–1.22)
MONOCYTES NFR BLD AUTO: 5 % (ref 4–12)
NEUTROPHILS # BLD AUTO: 4.83 THOUSANDS/ÂΜL (ref 1.85–7.62)
NEUTS SEG NFR BLD AUTO: 77 % (ref 43–75)
NRBC BLD AUTO-RTO: 0 /100 WBCS
PLATELET # BLD AUTO: 296 THOUSANDS/UL (ref 149–390)
PMV BLD AUTO: 9.6 FL (ref 8.9–12.7)
POTASSIUM SERPL-SCNC: 3.9 MMOL/L (ref 3.5–5.3)
PROT SERPL-MCNC: 6.4 G/DL (ref 6.4–8.4)
RBC # BLD AUTO: 3.45 MILLION/UL (ref 3.81–5.12)
SODIUM SERPL-SCNC: 139 MMOL/L (ref 135–147)
WBC # BLD AUTO: 6.29 THOUSAND/UL (ref 4.31–10.16)

## 2025-07-16 PROCEDURE — 97162 PT EVAL MOD COMPLEX 30 MIN: CPT

## 2025-07-16 PROCEDURE — 99232 SBSQ HOSP IP/OBS MODERATE 35: CPT | Performed by: INTERNAL MEDICINE

## 2025-07-16 PROCEDURE — 85025 COMPLETE CBC W/AUTO DIFF WBC: CPT

## 2025-07-16 PROCEDURE — 80053 COMPREHEN METABOLIC PANEL: CPT

## 2025-07-16 PROCEDURE — 99239 HOSP IP/OBS DSCHRG MGMT >30: CPT

## 2025-07-16 RX ADMIN — PANTOPRAZOLE SODIUM 40 MG: 40 TABLET, DELAYED RELEASE ORAL at 06:01

## 2025-07-16 RX ADMIN — CYANOCOBALAMIN TAB 1000 MCG 1000 MCG: 1000 TAB at 09:08

## 2025-07-16 RX ADMIN — FERROUS SULFATE TAB 325 MG (65 MG ELEMENTAL FE) 325 MG: 325 (65 FE) TAB at 09:09

## 2025-07-16 RX ADMIN — OXYCODONE HYDROCHLORIDE AND ACETAMINOPHEN 500 MG: 500 TABLET ORAL at 09:09

## 2025-07-16 RX ADMIN — LEVOTHYROXINE SODIUM 88 MCG: 88 TABLET ORAL at 06:01

## 2025-07-16 NOTE — CASE MANAGEMENT
Case Management Discharge Planning Note    Patient name Charlee Botello  Location /403-01 MRN [id number]  : 1940 Date 2025       Current Admission Date: 2025  Current Admission Diagnosis:MOHINDER (iron deficiency anemia)   Patient Active Problem List    Diagnosis Date Noted    Abnormal CT scan 07/15/2025    Age-related cognitive decline 2025    MOHINDER (iron deficiency anemia) 2024    COVID-19 2024    Stage 3b chronic kidney disease (HCC) 2023    Generalized osteoarthritis 2023    History of Helicobacter pylori infection 2023    Acquired renal cyst of left kidney 2023    Elevated d-dimer 2023    Symptomatic anemia 03/15/2022    Other chest pain 2021    Moderate protein-calorie malnutrition (HCC) 2021    Epigastric pain 2021    Essential hypertension 2021    Dyslipidemia 2021    Type 2 diabetes mellitus with stage 3b chronic kidney disease, without long-term current use of insulin (HCC) 2021    Acquired hypothyroidism 2021    Large hiatal hernia 2021    Hiatal hernia with GERD without esophagitis 2021    Hyperlipidemia 2021    Ulnar nerve abnormality 2020    Carpal tunnel syndrome of right wrist 2020    Carpal tunnel syndrome of left wrist 2020      LOS (days): 2  Geometric Mean LOS (GMLOS) (days): 2.8  Days to GMLOS:1.1     OBJECTIVE:  Risk of Unplanned Readmission Score: 10.3         Current admission status: Inpatient   Preferred Pharmacy:   [facility] [address]   [address] [facility] [address]  Phone: [phone] Fax: [phone]    Primary Care Provider: Timur Lawson MD    Primary Insurance: [employer] REP  Secondary Insurance: [email or web address].VN    DISCHARGE DETAILS:    Discharge planning discussed with:: patient  Freedom of Choice: Yes  Comments - Freedom of Choice: blanket referrals to [facility] agencies

## 2025-07-16 NOTE — ASSESSMENT & PLAN NOTE
Maintain IV access, monitor Hb, transfuse per protocol or for hemodynamic instability.   Pt received 1U PRBC and venofer. Hb stable on 07/16/25 at 8.2.  No overt GI bleeding. Rectal exam with light brown stool on finger.   Last EGD in 6/2025 hiatal hernia, gastritis, notes indicated at that time that pt declines colonoscopy.     Pt declines endoscopic evaluation at present.   Okay for diet as tolerated from GI standpoint.   Should she have progressive anemia/transfusion dependent anemia, or overt GI bleeding, bidirectional endoscopic evaluation should once again be reconsidered.   Encouraged pt to follow up with her primary GI team after discharge from hospital.

## 2025-07-16 NOTE — PROGRESS NOTES
Patient:    MRN:  [id number]    Kerri Request ID:  [id number]    Level of care reserved:  Home Health Agency    Partner Reserved:  [facility] [place] [address] [phone]    Clinical needs requested:    Geography searched:  [id number]    Start of Service:    Request sent:  12:09pm EDT on 7/16/2025 by Karina Booth    Partner reserved:  12:34pm EDT on 7/16/2025 by Karina Booth    Choice list shared:  12:33pm EDT on 7/16/2025 by Karina Booth

## 2025-07-16 NOTE — PHYSICAL THERAPY NOTE
"PHYSICAL THERAPY EVALUATION  NAME:  Charlee Botello  DATE: 07/16/25    AGE:   85 y.o.  Mrn:   [id number]  ADMIT DX:  Hiatal hernia [K44.9]  Chest pain [R07.9]  Acute anemia [D64.9]    Past Medical History[1]  Length Of Stay: 2  Performed at least 2 patient identifiers during session: Name and ID [email or web address]  PHYSICAL THERAPY EVALUATION :    07/16/25 0833   PT Last Visit   PT Visit Date 07/16/25   Note Type   Note type Evaluation   Pain Assessment   Pain Assessment Tool 0-10   Pain Score No Pain   Restrictions/Precautions   Weight Bearing Precautions Per Order No   Other Precautions Cognitive;Chair Alarm;Bed Alarm;Multiple lines;Fall Risk   Home Living   Type of Home House   Home Layout Two level;Bed/bath upstairs  (0 ACE; FFOS to 2nd lvl)   Bathroom Shower/Tub Tub/shower unit   Bathroom Toilet Standard   Bathroom Equipment Commode   Bathroom Accessibility Accessible   Home Equipment Walker;Cane   Additional Comments pt reports no use of AD at baseline   Prior Function   Level of [place] Independent with ADLs;Independent with functional mobility;Independent with IADLS   Lives With Alone  (pt reports living with son until a few weeks ago, unsure of return)   Receives Help From Family   IADLs Independent with driving;Independent with meal prep;Independent with medication management   Falls in the last 6 months 1 to 4  (pt reports 1 fall)   Vocational Retired   General   Family/Caregiver Present No   Cognition   Overall Cognitive Status Impaired   Arousal/Participation Alert   Orientation Level Oriented X4   Following Commands Follows one step commands without difficulty   Comments tangential about unrelated topics; difficult to redirect to PT evaluation   Subjective   Subjective \"I enjoy walking and being independent\"   RLE Assessment   RLE Assessment WFL  (assessed functionally)   LLE Assessment   LLE Assessment WFL  (assessed functionally)   Bed Mobility   Additional Comments pt OOB in bedside chair at start/end of " session   Transfers   Sit to Stand 5  Supervision   Additional items Armrests;Verbal cues   Stand to Sit 5  Supervision   Additional items Armrests;Verbal cues   Additional Comments no device used; VCs for safety awareness   Ambulation/Elevation   Gait pattern Narrow PAWAN;Forward Flexion;Decreased foot clearance;Scissoring;Short stride;Step through pattern  (decreased velocity; mild instability, pt able to correct; no true LOB experienced)   Gait Assistance 5  Supervision   Additional items Verbal cues   Assistive Device None   Distance 80'   Ambulation/Elevation Additional Comments pt appears fatigued with increased amb; seated rest break taken following amb; pt reports feeling tired; VCs for safety awareness   Balance   Static Sitting Good   Dynamic Sitting Good   Static Standing Fair +   Dynamic Standing Fair +   Ambulatory Fair   Endurance Deficit   Endurance Deficit Yes   Endurance Deficit Description pt appears fatigued with increased amb; pt reports feeling tired; SpO2 and HR remained WFLs on RA throughout   Activity Tolerance   Activity Tolerance Patient limited by fatigue   Assessment   Prognosis Good   Problem List Decreased strength;Decreased endurance;Impaired balance;Decreased mobility;Decreased cognition;Decreased safety awareness   Goals   Patient Goals to go home   LTG Expiration Date 07/30/25   Plan   Treatment/Interventions Functional transfer training;LE strengthening/ROM;Elevations;Therapeutic exercise;Endurance training;Bed mobility;Gait training   PT Frequency 3-5x/wk   Discharge Recommendation   Rehab Resource Intensity Level, PT III (Minimum Resource Intensity)   AM-PAC Basic Mobility Inpatient   Turning in Flat Bed Without Bedrails 4   Lying on Back to Sitting on Edge of Flat Bed Without Bedrails 4   Moving Bed to Chair 3   Standing Up From Chair Using Arms 4   Walk in Room 3   Climb 3-5 Stairs With Railing 3   Basic Mobility Inpatient Raw Score 21   Basic Mobility Standardized Score 45.55    [facility] Highest Level Of Mobility   -HL Goal 6: Walk 10 steps or more   -HLM Achieved 7: Walk 25 feet or more   End of Consult   Patient Position at End of Consult Bedside chair;Bed/Chair alarm activated;All needs within reach     (Please find full objective findings from PT assessment regarding body systems outlined above).     Assessment: Pt is a 85 y.o. female who presented to [facility] on 7/14/2025 with MOHINDER (iron deficiency anemia). Order placed for PT services. Pt seen for PT evaluation on 07/16/25. Refer to comprehensive exam completed above. Upon evaluation: Pt is presenting with functional deficits including decreased strength, decreased endurance, decreased activity tolerance, impaired balance, gait deviations, impaired cognition, and decreased safety awareness, requiring SUP assistance for transfers and ambulation with no device. Pt's clinical presentation is currently evolving given functional impairments listed above as well as medical complications of hypertension , poor blood pressure control, need for input for mobility technique/safety, abnormal CBC, and abnormal H&H. Pt's PMHx and comorbidities that may impact pt's participation and functional progress include:Hx of falls, age-related cognitive decline, DM II, and B/L carpal tunnel syndrome. Personal factors affecting pt that may cause a barrier to progress and discharge include advanced age, multilevel home environment, inability to ambulate household distances, inability to ambulate community distances, need for assistance with ADLs, need for assistance with IADLs, frequent falls/fall history, and fall risk. Pt's rehab potential is Good as indicated by barriers and findings above. Current Physical Therapy recommendation at time of discharge is Level III (Minimum Resource Intensity). Pt will benefit from continued skilled PT services to address deficits as defined above with focus on Functional transfer training, LE  strengthening/ROM, Elevations, Therapeutic exercise, Endurance training, Bed mobility, Gait training to maximize level of functional mobility to facilitate return toward PLOF and improved QOL. Recommend trial with walker as appropriate.     The patient's AM-PAC Basic Mobility Inpatient Short Form Raw Score is 21. A Raw score of greater than 16 suggests the patient may benefit from discharge to home. Please also refer to the recommendation of the Physical Therapist for safe discharge planning.    Goals: Pt will participate in B LE strengthening exercises to facilitate improved functional activity tolerance. Pt will perform all functional transfers and bed mobility mod(I) with good safety awareness. Pt will ambulate 250' mod(I) with LRAD while maintaining good functional dynamic balance. Pt will ascend/descend a FFOS with HR and SUP to reflect the ability to safely navigate the home.     Rohith Nolen, SPT         [1]   Past Medical History:  Diagnosis Date    Acquired renal cyst of left kidney 08/09/2023    Arthritis     Breast cancer (HCC)     Cancer (HCC)     Diabetes mellitus (HCC)     Disease of thyroid gland     Generalized osteoarthritis 08/09/2023    Hiatal hernia     Hiatal hernia with GERD without esophagitis 04/16/2021    History of Helicobacter pylori infection 08/09/2023    Hypertension     Stage 3b chronic kidney disease (HCC) 08/09/2023    Type 2 diabetes mellitus with stage 3b chronic kidney disease, without long-term current use of insulin (HCC) 06/30/2021

## 2025-07-16 NOTE — DISCHARGE SUMMARY
"Discharge Summary - Hospitalist   Name: Charlee Botello 85 y.o. female I MRN: [id number]  Unit/Bed#: 403-01 I Date of Admission: 7/14/2025   Date of Service: 7/16/2025 I Hospital Day: 2     Assessment & Plan  MOHINDER (iron deficiency anemia)  With long standing hx of same, following with GI team Dr Lucia   Underwent EGD on 6/2025 for workup revealing hiatal hernia, gastritis. She was noted to also refuse colonoscopy at that time. Recommendation were to continue iron infusion which she reportedly missed outpatient given transportation issues  Remains without overt bleeding  Hgb stable s/p 1 unit pRBC & Venofer 200mg x 1  Appreciate GI consult, patient continues to decline endoscopic evaluation. Agree would have to re-consider scopes if develops overt bleeding or worsening anemia. Recommend patient to follow-up with her outpatient GI team with Dr Lucia. Continue outpatient iron. Return to care precautions given   Tolerating regular diet   Abnormal CT scan  CT abd/pelvis \"Luminal narrowing and possible wall thickening of a short segment of the ascending colon may be due to peristalsis. However, an underlying lesion cannot be excluded, and short interval follow-up CT abdomen/pelvis with oral and intravenous contrast is recommended for further assessment. Review of medical and colonoscopy history is advised to determine whether colonoscopy is warranted at this time (instead of repeat CT).\"   Patient continues to decline colonoscopy  Recommend outpatient follow-up with her primary GI team  Acquired hypothyroidism  Continue levothyroxine  Generalized osteoarthritis  Takes as needed Norco at home  Hiatal hernia with GERD without esophagitis  Continue Protonix  Age-related cognitive decline  A&O x 4, but does does have some limitation in memory recollection      Medical Problems       Resolved Problems  Date Reviewed: 7/14/2025   None       Discharging Physician / Practitioner: Yolanda Zuniga PA-C  PCP: Timur Lawson, " MD  Admission Date:   Admission Orders (From admission, onward)       Ordered        07/14/25 1943  INPATIENT ADMISSION  Once                          Discharge Date: 07/16/25    Next Steps for Physician/AP Assuming Care:  Follow-up with GI team   Follow-up with PCP within 1 week     Test Results Pending at Discharge (will require follow up):  None    Medication Changes for Discharge & Rationale:   none  See after visit summary for reconciled discharge medications provided to patient and/or family.     Consultations During Hospital Stay:  GI  Case management  PT/OT    Procedures Performed:   None    Significant Findings / Test Results:   CXR: No acute cardiopulmonary disease.Large hiatal hernia.  CT abd/pelvis wo: Luminal narrowing and possible wall thickening of a short segment of the ascending colon may be due to peristalsis. However, an underlying lesion cannot be excluded, and short interval follow-up CT abdomen/pelvis with oral and intravenous contrast is recommended for further assessment. Review of medical and colonoscopy history is advised to determine whether colonoscopy is warranted at this time (instead of repeat CT). Given the absence of overlying inflammation, acute focal colitis is considered less likely. Otherwise no potential acute abdominopelvic pathology.    Incidental Findings:   None       Hospital Course:   Charlee Botello is a 85 y.o. female patient who originally presented to the hospital on 7/14/2025 due to symptomatic anemia.  Patient with history of iron deficiency anemia and does follow with her primary care doctor and outpatient GI doctor for same.  She was given 1 unit PRBC and Venofer infusion with significant improvement in symptoms.  GI was consulted and recommended endoscopic evaluation, especially in setting of possible luminal narrowing and wall thickening with a short segment descending colon, which patient declined. She did have recent EGD last month revealing hiatal hernia and  "esophagitis, otherwise unremarkable.  It was noted that she declined colonoscopy that time as well.  She remains without overt signs of bleeding.  Hemoglobin remained otherwise stable.  Her diet was advanced and patient tolerated.  She was cleared for discharge by GI team.  Recommended follow-up with her primary care team.  Return to care precautions given.  She was seen by PT and recommended for level 3, discharged home with home care nurses.          Please see above list of diagnoses and related plan for additional information.     Discharge Day Visit / Exam:   Subjective:  patient seen and examined. Feels well today. No pain. Tolerating regular diet. No melena, hematochezia   Vitals: Blood Pressure: 140/68 (07/16/25 0700)  Pulse: 72 (07/16/25 0700)  Temperature: 98.5 °F (36.9 °C) (07/16/25 1300)  Temp Source: Oral (07/16/25 1300)  Respirations: 17 (07/16/25 0649)  Height: 5' 2\" (157.5 cm) (07/14/25 2056)  Weight - Scale: 63.8 kg (140 lb 9.6 oz) (07/14/25 2056)  SpO2: 94 % (07/16/25 0700)  Physical Exam  HENT:      Head: Normocephalic and atraumatic.     Cardiovascular:      Rate and Rhythm: Normal rate and regular rhythm.   Pulmonary:      Effort: Pulmonary effort is normal. No respiratory distress.      Breath sounds: Normal breath sounds.   Abdominal:      General: Abdomen is flat. There is no distension.      Palpations: Abdomen is soft.      Tenderness: There is no abdominal tenderness.     Musculoskeletal:      Right lower leg: No edema.      Left lower leg: No edema.     Skin:     General: Skin is warm and dry.     Neurological:      General: No focal deficit present.      Mental Status: She is alert and oriented to person, place, and time.          Discussion with Family: Patient declined call to .     Discharge instructions/Information to patient and family:   See after visit summary for information provided to patient and family.      Provisions for Follow-Up Care:  See after visit summary " for information related to follow-up care and any pertinent home health orders.      Mobility at time of Discharge:   Basic Mobility Inpatient Raw Score: 21  JH-HLM Goal: 6: Walk 10 steps or more  JH-HLM Achieved: 7: Walk 25 feet or more  HLM Goal achieved. Continue to encourage appropriate mobility.     Disposition:   Home with VNA Services (Reminder: Complete face to face encounter)    Planned Readmission: none    Administrative Statements   Discharge Statement:  I have spent a total time of 35 minutes in caring for this patient on the day of the visit/encounter. .    **Please Note: This note may have been constructed using a voice recognition system**

## 2025-07-16 NOTE — ASSESSMENT & PLAN NOTE
Noted, history of such.  Continue PPI twice daily.  Recommend diet and lifestyle modifications for GERD.  This includes avoiding spicy, saucy, greasy/oily foods, citrus, EtOH, NSAIDs, tobacco.  Avoid eating within 2 to 3 hours of bed.  Elevating height of bed 6 inches on blocks may be beneficial.

## 2025-07-16 NOTE — ASSESSMENT & PLAN NOTE
"CT abd/pelvis \"Luminal narrowing and possible wall thickening of a short segment of the ascending colon may be due to peristalsis. However, an underlying lesion cannot be excluded, and short interval follow-up CT abdomen/pelvis with oral and intravenous contrast is recommended for further assessment. Review of medical and colonoscopy history is advised to determine whether colonoscopy is warranted at this time (instead of repeat CT).\"   Patient continues to decline colonoscopy  Recommend outpatient follow-up with her primary GI team  "

## 2025-07-16 NOTE — DISCHARGE INSTR - AVS FIRST PAGE
You have declined any further endoscopic evaluation for the workup of your anemia and abnormal CT imaging commenting on luminal narrowing & possible wall thickening   You have remained without signs of active GI bleeding. If these were to occur, recommend returning to hospital for care. Signs such as dark/tarry stool, blood in your stool, worsening shortness of breath, weakness/fatigue, dizziness/lightheadedness   Follow up with your primary GI team at discharge  Continue with iron infusions as recommended by your GI team   Home health care services have been arranged   Please follow-up with your primary care provider within 1 week

## 2025-07-16 NOTE — ASSESSMENT & PLAN NOTE
With long standing hx of same, following with GI team Dr Lucia   Underwent EGD on 6/2025 for workup revealing hiatal hernia, gastritis. She was noted to also refuse colonoscopy at that time. Recommendation were to continue iron infusion which she reportedly missed outpatient given transportation issues  Remains without overt bleeding  Hgb stable s/p 1 unit pRBC & Venofer 200mg x 1  Appreciate GI consult, patient continues to decline endoscopic evaluation. Agree would have to re-consider scopes if develops overt bleeding or worsening anemia. Recommend patient to follow-up with her outpatient GI team with Dr Lucia. Continue outpatient iron. Return to care precautions given   Tolerating regular diet

## 2025-07-16 NOTE — PLAN OF CARE
Problem: PHYSICAL THERAPY ADULT  Goal: Performs mobility at highest level of function for planned discharge setting.  See evaluation for individualized goals.  Description: Treatment/Interventions: Functional transfer training, LE strengthening/ROM, Elevations, Therapeutic exercise, Endurance training, Bed mobility, Gait training          See flowsheet documentation for full assessment, interventions and recommendations.  Note: Prognosis: Good  Problem List: Decreased strength, Decreased endurance, Impaired balance, Decreased mobility, Decreased cognition, Decreased safety awareness      Assessment: Pt is a 85 y.o. female who presented to [facility] on 7/14/2025 with MOHINDER (iron deficiency anemia). Order placed for PT services. Pt seen for PT evaluation on 07/16/25. Refer to comprehensive exam completed above. Upon evaluation: Pt is presenting with functional deficits including decreased strength, decreased endurance, decreased activity tolerance, impaired balance, gait deviations, impaired cognition, and decreased safety awareness, requiring SUP assistance for transfers and ambulation with no device. Pt's clinical presentation is currently evolving given functional impairments listed above as well as medical complications of hypertension , poor blood pressure control, need for input for mobility technique/safety, abnormal CBC, and abnormal H&H. Pt's PMHx and comorbidities that may impact pt's participation and functional progress include:Hx of falls, age-related cognitive decline, DM II, and B/L carpal tunnel syndrome. Personal factors affecting pt that may cause a barrier to progress and discharge include advanced age, multilevel home environment, inability to ambulate household distances, inability to ambulate community distances, need for assistance with ADLs, need for assistance with IADLs, frequent falls/fall history, and fall risk. Pt's rehab potential is Good as indicated by barriers and  findings above. Current Physical Therapy recommendation at time of discharge is Level III (Minimum Resource Intensity). Pt will benefit from continued skilled PT services to address deficits as defined above with focus on Functional transfer training, LE strengthening/ROM, Elevations, Therapeutic exercise, Endurance training, Bed mobility, Gait training to maximize level of functional mobility to facilitate return toward PLOF and improved QOL. Recommend trial with walker as appropriate.      Rehab Resource Intensity Level, PT: III (Minimum Resource Intensity)    See flowsheet documentation for full assessment.

## 2025-07-16 NOTE — CASE MANAGEMENT
Case Management Discharge Planning Note    Patient name Charlee Botello  Location /403-01 MRN [id number]  : 1940 Date 2025       Current Admission Date: 2025  Current Admission Diagnosis:MOHINDER (iron deficiency anemia)   Patient Active Problem List    Diagnosis Date Noted    Abnormal CT scan 07/15/2025    Age-related cognitive decline 2025    MOHINDER (iron deficiency anemia) 2024    COVID-19 2024    Stage 3b chronic kidney disease (HCC) 2023    Generalized osteoarthritis 2023    History of Helicobacter pylori infection 2023    Acquired renal cyst of left kidney 2023    Elevated d-dimer 2023    Symptomatic anemia 03/15/2022    Other chest pain 2021    Moderate protein-calorie malnutrition (HCC) 2021    Epigastric pain 2021    Essential hypertension 2021    Dyslipidemia 2021    Type 2 diabetes mellitus with stage 3b chronic kidney disease, without long-term current use of insulin (HCC) 2021    Acquired hypothyroidism 2021    Large hiatal hernia 2021    Hiatal hernia with GERD without esophagitis 2021    Hyperlipidemia 2021    Ulnar nerve abnormality 2020    Carpal tunnel syndrome of right wrist 2020    Carpal tunnel syndrome of left wrist 2020      LOS (days): 2  Geometric Mean LOS (GMLOS) (days): 2.8  Days to GMLOS:1.1     OBJECTIVE:  Risk of Unplanned Readmission Score: 10.3         Current admission status: Inpatient   Preferred Pharmacy:   [facility] [address]   [address] [facility] [address]  Phone: [phone] Fax: [phone]    Primary Care Provider: Timur Lawson MD    Primary Insurance: [employer] [facility] [employer]  Secondary Insurance: [employer]    DISCHARGE DETAILS:    Discharge planning discussed with:: patient  Freedom of Choice: Yes  Comments - Freedom of Choice: blanket referrals to [facility] agencies                     Requested Home Health  Care         Is the patient interested in HHC at discharge?: Yes  Home Health Discipline requested:: Nursing, Occupational Therapy, Physical Therapy  Home Health Agency Name:: [facility]  HHA External Referral Reason (only applicable if external HHA name selected): Services not provided in network or near patient location  Home Health Follow-Up Provider:: ARNIE  Home Health Services Needed:: Strengthening/Theraputic Exercises to Improve Function, Gait/ADL Training, Evaluate Functional Status and Safety  Homebound Criteria Met:: Uses an Assist Device (i.e. cane, walker, etc)  Supporting Clincal Findings:: Limited Endurance

## 2025-07-16 NOTE — ASSESSMENT & PLAN NOTE
Patient was alert and oriented x 3 at time of my interview however she did have some trouble with historical details.  Defer to primary team.

## 2025-07-16 NOTE — ASSESSMENT & PLAN NOTE
I did review with patient that the CT commented on luminal narrowing and possible wall thickening of a short segment of the ascending colon.  I did review with patient that  for this potential abnormality a colonoscopy is recommended.  She politely declines invasive procedures including colonoscopy at this time, and understands this may take diagnosis of pathology.

## 2025-07-16 NOTE — PROGRESS NOTES
Progress Note - Gastroenterology   Name: Charlee Botello 85 y.o. female I MRN: [id number]  Unit/Bed#: 403-01 I Date of Admission: 7/14/2025   Date of Service: 7/16/2025 I Hospital Day: 2    Assessment & Plan  MOHINDER (iron deficiency anemia)  Maintain IV access, monitor Hb, transfuse per protocol or for hemodynamic instability.   Pt received 1U PRBC and venofer. Hb stable on 07/16/25 at 8.2.  No overt GI bleeding. Rectal exam with light brown stool on finger.   Last EGD in 6/2025 hiatal hernia, gastritis, notes indicated at that time that pt declines colonoscopy.     Pt declines endoscopic evaluation at present.   Okay for diet as tolerated from GI standpoint.   Should she have progressive anemia/transfusion dependent anemia, or overt GI bleeding, bidirectional endoscopic evaluation should once again be reconsidered.   Encouraged pt to follow up with her primary GI team after discharge from hospital.   Abnormal CT scan  I did review with patient that the CT commented on luminal narrowing and possible wall thickening of a short segment of the ascending colon.  I did review with patient that  for this potential abnormality a colonoscopy is recommended.  She politely declines invasive procedures including colonoscopy at this time, and understands this may take diagnosis of pathology.  Hiatal hernia with GERD without esophagitis  Noted, history of such.  Continue PPI twice daily.  Recommend diet and lifestyle modifications for GERD.  This includes avoiding spicy, saucy, greasy/oily foods, citrus, EtOH, NSAIDs, tobacco.  Avoid eating within 2 to 3 hours of bed.  Elevating height of bed 6 inches on blocks may be beneficial.       Acquired hypothyroidism  Continue levothyroxine per primary team.  Generalized osteoarthritis  Management per primary team.  Age-related cognitive decline  Patient was alert and oriented x 3 at time of my interview however she did have some trouble with historical details.  Defer to primary team.    I  have discussed the above management plan in detail with the primary service.   Gastroenterology service signing off.    Subjective     86 y/o F w/ pmhx sig for GERD, hiatal hernia, hypothyroidism, DM2, HLD, CKD3.     Admitted with symptomatic anemia with a sophie of 6.3.  Transfused 1U PRBC.    Interval events:     Patient denies any significant heartburn, BRBPR or melena.  Was having some left-sided abdominal pain/rib pain that seem to michelle following defecation.  No constipation or diarrhea.    Objective :  Temp:  [97.8 °F (36.6 °C)-98.2 °F (36.8 °C)] 98.1 °F (36.7 °C)  HR:  [72-84] 72  BP: (130-161)/(62-83) 140/68  Resp:  [16-18] 17  SpO2:  [94 %-97 %] 94 %  O2 Device: None (Room air)    Physical Exam  Vitals and nursing note reviewed.   Constitutional:       General: She is not in acute distress.     Appearance: She is well-developed.   HENT:      Head: Normocephalic and atraumatic.     Eyes:      General: No scleral icterus.     Conjunctiva/sclera: Conjunctivae normal.       Cardiovascular:      Rate and Rhythm: Normal rate.   Pulmonary:      Effort: Pulmonary effort is normal. No respiratory distress.   Abdominal:      General: There is no distension.      Palpations: Abdomen is soft.      Tenderness: There is no abdominal tenderness. There is no guarding or rebound.     Skin:     General: Skin is warm and dry.      Coloration: Skin is not jaundiced.     Neurological:      General: No focal deficit present.      Mental Status: She is alert. Mental status is at baseline.     Psychiatric:         Mood and Affect: Mood normal.         Behavior: Behavior normal.         Lab Results: I have reviewed the following results:CBC/BMP:   .     07/16/25  0501   WBC 6.29   HGB 8.2*   HCT 28.6*      SODIUM 139   K 3.9      CO2 27   BUN 9   CREATININE 1.07   GLUC 94    , Creatinine Clearance: Estimated Creatinine Clearance: 33.1 mL/min (by C-G formula based on SCr of 1.07 mg/dL)., LFTs:   .     07/16/25  0501    AST 12*   ALT 6*   ALB 3.9   TBILI 0.85   ALKPHOS 49      Imaging Results Review: I reviewed radiology reports from this admission including: CT abdomen/pelvis.  Other Study Results Review: Pathology reports reviewed.    **Please note:  Dictation voice to text software may have been used in the creation of this record.  Occasional wrong word or “sound alike” substitutions may have occurred due to the inherent limitations of voice recognition software.  Read the chart carefully and recognize, using context, where substitutions have occurred.**